# Patient Record
Sex: FEMALE | Race: WHITE | NOT HISPANIC OR LATINO | Employment: UNEMPLOYED | URBAN - NONMETROPOLITAN AREA
[De-identification: names, ages, dates, MRNs, and addresses within clinical notes are randomized per-mention and may not be internally consistent; named-entity substitution may affect disease eponyms.]

---
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
## 2019-02-04 ENCOUNTER — TELEPHONE (OUTPATIENT)
Dept: FAMILY MEDICINE | Facility: OTHER | Age: 19
End: 2019-02-04

## 2019-02-04 NOTE — TELEPHONE ENCOUNTER
10:37 AM    Reason for Call: OVERBOOK    Patient is having the following symptoms:   Needs Birth Control    The patient is requesting an appointment for today  with Brandi Nolan    Was an appointment offered for this call?   No    Preferred method for responding to this message: [phone]    If we cannot reach you directly, may we leave a detailed response at the number you provided?   Yes      Jennifer Brown

## 2019-02-05 NOTE — TELEPHONE ENCOUNTER
LM for patient apologizing in regards to her missed phone call yesterday, she is scheduled with Dagmar King but I did ask that she call back if she does want to see Brandi Nolan as she has openings tomorrow.

## 2019-02-06 ENCOUNTER — OFFICE VISIT (OUTPATIENT)
Dept: FAMILY MEDICINE | Facility: OTHER | Age: 19
End: 2019-02-06
Attending: NURSE PRACTITIONER
Payer: OTHER GOVERNMENT

## 2019-02-06 VITALS
TEMPERATURE: 98.6 F | DIASTOLIC BLOOD PRESSURE: 62 MMHG | SYSTOLIC BLOOD PRESSURE: 110 MMHG | BODY MASS INDEX: 17.74 KG/M2 | HEART RATE: 76 BPM | RESPIRATION RATE: 18 BRPM | OXYGEN SATURATION: 99 % | WEIGHT: 88 LBS | HEIGHT: 59 IN

## 2019-02-06 DIAGNOSIS — N92.6 IRREGULAR MENSTRUAL BLEEDING: Primary | ICD-10-CM

## 2019-02-06 LAB
BASOPHILS # BLD AUTO: 0 10E9/L (ref 0–0.2)
BASOPHILS NFR BLD AUTO: 0.6 %
DIFFERENTIAL METHOD BLD: ABNORMAL
EOSINOPHIL # BLD AUTO: 0.1 10E9/L (ref 0–0.7)
EOSINOPHIL NFR BLD AUTO: 2.4 %
ERYTHROCYTE [DISTWIDTH] IN BLOOD BY AUTOMATED COUNT: 17.7 % (ref 10–15)
FERRITIN SERPL-MCNC: 10 NG/ML (ref 12–150)
HCT VFR BLD AUTO: 36 % (ref 35–47)
HGB BLD-MCNC: 11.5 G/DL (ref 11.7–15.7)
IMM GRANULOCYTES # BLD: 0 10E9/L (ref 0–0.4)
IMM GRANULOCYTES NFR BLD: 0.2 %
IRON SATN MFR SERPL: 93 % (ref 15–46)
IRON SERPL-MCNC: 355 UG/DL (ref 35–180)
LYMPHOCYTES # BLD AUTO: 2 10E9/L (ref 0.8–5.3)
LYMPHOCYTES NFR BLD AUTO: 40.5 %
MCH RBC QN AUTO: 27.1 PG (ref 26.5–33)
MCHC RBC AUTO-ENTMCNC: 31.9 G/DL (ref 31.5–36.5)
MCV RBC AUTO: 85 FL (ref 78–100)
MONOCYTES # BLD AUTO: 0.3 10E9/L (ref 0–1.3)
MONOCYTES NFR BLD AUTO: 5.1 %
NEUTROPHILS # BLD AUTO: 2.5 10E9/L (ref 1.6–8.3)
NEUTROPHILS NFR BLD AUTO: 51.2 %
NRBC # BLD AUTO: 0 10*3/UL
NRBC BLD AUTO-RTO: 0 /100
PLATELET # BLD AUTO: 276 10E9/L (ref 150–450)
RBC # BLD AUTO: 4.24 10E12/L (ref 3.8–5.2)
TIBC SERPL-MCNC: 382 UG/DL (ref 240–430)
WBC # BLD AUTO: 4.9 10E9/L (ref 4–11)

## 2019-02-06 PROCEDURE — 83550 IRON BINDING TEST: CPT | Performed by: NURSE PRACTITIONER

## 2019-02-06 PROCEDURE — 83540 ASSAY OF IRON: CPT | Performed by: NURSE PRACTITIONER

## 2019-02-06 PROCEDURE — 99213 OFFICE O/P EST LOW 20 MIN: CPT | Performed by: NURSE PRACTITIONER

## 2019-02-06 PROCEDURE — 82728 ASSAY OF FERRITIN: CPT | Performed by: NURSE PRACTITIONER

## 2019-02-06 PROCEDURE — 36415 COLL VENOUS BLD VENIPUNCTURE: CPT | Performed by: NURSE PRACTITIONER

## 2019-02-06 PROCEDURE — 85025 COMPLETE CBC W/AUTO DIFF WBC: CPT | Performed by: NURSE PRACTITIONER

## 2019-02-06 ASSESSMENT — MIFFLIN-ST. JEOR: SCORE: 1079.8

## 2019-02-06 ASSESSMENT — PAIN SCALES - GENERAL: PAINLEVEL: MILD PAIN (2)

## 2019-02-06 NOTE — PROGRESS NOTES
SUBJECTIVE:   Trinh Hart is a 18 year old female who presents to clinic today for the following health issues:    Contraception management       Duration: ongoing     Description (location/character/radiation): N/A    Intensity:  mild    Accompanying signs and symptoms: Patient states she is constantly menstruating     History (similar episodes/previous evaluation): IUD in place     Precipitating or alleviating factors: None    Therapies tried and outcome: None         Problem list and histories reviewed & adjusted, as indicated.  Additional history: as documented    Patient Active Problem List   Diagnosis     NO ACTIVE PROBLEMS     Adjustment disorder with depressed mood     PTSD (post-traumatic stress disorder)     History reviewed. No pertinent surgical history.    Social History     Tobacco Use     Smoking status: Never Smoker     Smokeless tobacco: Never Used   Substance Use Topics     Alcohol use: No     Family History   Problem Relation Age of Onset     Mental Illness Mother      Mental Illness Father          Current Outpatient Medications   Medication Sig Dispense Refill     hydrOXYzine (ATARAX) 25 MG tablet Take 1-2 tablets (25-50 mg) by mouth every 6 hours as needed for anxiety 60 tablet 1     paragard intrauterine copper 1 each by Intrauterine route continuous       Allergies   Allergen Reactions     Apple      Gums/mouth gets itchy     Cherry      Gums/mouth gets itchy     Pistachios [Nuts]        Reviewed and updated as needed this visit by clinical staff       Reviewed and updated as needed this visit by Provider         ROS:  CONSTITUTIONAL: NEGATIVE for fever, chills, change in weight  ENT/MOUTH: NEGATIVE for ear, mouth and throat problems  RESP: NEGATIVE for significant cough or SOB  CV: NEGATIVE for chest pain, palpitations or peripheral edema  GI: abdominal pain epigastric  : abnormal menstrual cycles and denies dysuria    OBJECTIVE:     /62 (BP Location: Left arm, Patient Position:  "Sitting, Cuff Size: Adult Regular)   Pulse 76   Temp 98.6  F (37  C) (Tympanic)   Resp 18   Ht 1.499 m (4' 11\")   Wt 39.9 kg (88 lb)   LMP 01/18/2019   SpO2 99%   BMI 17.77 kg/m    Body mass index is 17.77 kg/m .   GENERAL: healthy, alert and no distress  RESP: lungs clear to auscultation - no rales, rhonchi or wheezes  CV: regular rate and rhythm, normal S1 S2, no S3 or S4, no murmur, click or rub, no peripheral edema and peripheral pulses strong  ABDOMEN: soft, nontender, no hepatosplenomegaly, no masses and bowel sounds normal  PSYCH: mentation appears normal, affect normal/bright    Diagnostic Test Results:  Results for orders placed or performed in visit on 02/06/19   CBC with platelets and differential   Result Value Ref Range    WBC 4.9 4.0 - 11.0 10e9/L    RBC Count 4.24 3.8 - 5.2 10e12/L    Hemoglobin 11.5 (L) 11.7 - 15.7 g/dL    Hematocrit 36.0 35.0 - 47.0 %    MCV 85 78 - 100 fl    MCH 27.1 26.5 - 33.0 pg    MCHC 31.9 31.5 - 36.5 g/dL    RDW 17.7 (H) 10.0 - 15.0 %    Platelet Count 276 150 - 450 10e9/L    Diff Method Automated Method     % Neutrophils 51.2 %    % Lymphocytes 40.5 %    % Monocytes 5.1 %    % Eosinophils 2.4 %    % Basophils 0.6 %    % Immature Granulocytes 0.2 %    Nucleated RBCs 0 0 /100    Absolute Neutrophil 2.5 1.6 - 8.3 10e9/L    Absolute Lymphocytes 2.0 0.8 - 5.3 10e9/L    Absolute Monocytes 0.3 0.0 - 1.3 10e9/L    Absolute Eosinophils 0.1 0.0 - 0.7 10e9/L    Absolute Basophils 0.0 0.0 - 0.2 10e9/L    Abs Immature Granulocytes 0.0 0 - 0.4 10e9/L    Absolute Nucleated RBC 0.0    Ferritin   Result Value Ref Range    Ferritin 10 (L) 12 - 150 ng/mL   Iron and iron binding capacity   Result Value Ref Range    Iron 355 (H) 35 - 180 ug/dL    Iron Binding Cap 382 240 - 430 ug/dL    Iron Saturation Index 93 (H) 15 - 46 %         ASSESSMENT/PLAN:     1. Irregular menstrual bleeding  Plan for follow up with OB/GYN   With continued low ferritin and elevated iron level, Plan to decrease " iron to every other day. She can take with food if she still has an upset stomach or switch to liquid.   - CBC with platelets and differential  - Ferritin  - Iron and iron binding capacity    Tobacco Cessation:   reports that  has never smoked. she has never used smokeless tobacco.        See Patient Instructions    RONEL Garcia [facility]

## 2019-02-06 NOTE — NURSING NOTE
"Chief Complaint   Patient presents with     Contraception       Initial /62 (BP Location: Left arm, Patient Position: Sitting, Cuff Size: Adult Regular)   Pulse 76   Temp 98.6  F (37  C) (Tympanic)   Resp 18   Ht 1.499 m (4' 11\")   Wt 39.9 kg (88 lb)   LMP 01/18/2019   SpO2 99%   BMI 17.77 kg/m   Estimated body mass index is 17.77 kg/m  as calculated from the following:    Height as of this encounter: 1.499 m (4' 11\").    Weight as of this encounter: 39.9 kg (88 lb).  Medication Reconciliation: complete    Noris Diaz LPN  "

## 2019-02-06 NOTE — PATIENT INSTRUCTIONS
Patient Education     Iron-Deficiency Anemia (Adult)  Red blood cells carry oxygen to the tissues of your body. Anemia is a condition in which you have too few red blood cells. You need iron to make red cells. Anemia makes you feel tired and run down. When anemia becomes severe, your skin becomes pale. You may feel short of breath after physical activity. Other symptoms include:    Headaches    Dizziness    Leg cramps with physical activity    Drowsiness    Restless legs  Your anemia is caused by not having enough iron in your body. This may be because of:    Loss of blood. This can be caused by heavy menstrual periods. It can also be caused by bleeding from the stomach or intestines.    Poor diet. You may not be eating enough foods that contain iron.    Inability to absorb iron from the foods you eat    Pregnancy  If your blood count is low enough, your healthcare provider may prescribe an iron supplement. It usually takes about 2 to 3 months of treatment with iron supplements to correct anemia. Severe cases of anemia need a blood transfusion to quickly ease symptoms and deliver more oxygen to the cells.  Home care  Follow these guidelines when caring for yourself at home:    Eat foods high in iron. This will boost the amount of iron stored in your body. It is a natural way to build up the number of blood cells. Good sources of iron include beef, liver, spinach and other dark green leafy vegetables, whole grains, beans, and nuts.    Don't overexert yourself.    Talk with your healthcare provider before traveling by air or traveling to high altitudes.  Follow-up care  Follow up with your healthcare provider in 2 months, or as advised. This is to have another red blood cell count to be sure your anemia has been fixed.  Call 911  Call 911 or seek immediate medical care if any of these occur:    Shortness of breath or chest pain    Dizziness or fainting    Vomiting blood or passing red or black-colored stool   Date  Last Reviewed: 3/1/2018    3649-2048 [employer]. [address] [place] [address], [place] [address]. All rights reserved. This information is not intended as a substitute for professional medical care. Always follow your healthcare professional's instructions.

## 2019-02-12 ENCOUNTER — OFFICE VISIT (OUTPATIENT)
Dept: OBGYN | Facility: OTHER | Age: 19
End: 2019-02-12
Attending: NURSE PRACTITIONER
Payer: OTHER GOVERNMENT

## 2019-02-12 VITALS
HEIGHT: 59 IN | HEART RATE: 69 BPM | BODY MASS INDEX: 17.74 KG/M2 | SYSTOLIC BLOOD PRESSURE: 102 MMHG | WEIGHT: 88 LBS | DIASTOLIC BLOOD PRESSURE: 60 MMHG | OXYGEN SATURATION: 100 %

## 2019-02-12 DIAGNOSIS — N92.1 BREAKTHROUGH BLEEDING WITH IUD: Primary | ICD-10-CM

## 2019-02-12 DIAGNOSIS — Z97.5 BREAKTHROUGH BLEEDING WITH IUD: Primary | ICD-10-CM

## 2019-02-12 PROCEDURE — 99212 OFFICE O/P EST SF 10 MIN: CPT | Performed by: NURSE PRACTITIONER

## 2019-02-12 RX ORDER — ACETAMINOPHEN AND CODEINE PHOSPHATE 120; 12 MG/5ML; MG/5ML
0.35 SOLUTION ORAL DAILY
Qty: 84 TABLET | Refills: 0 | Status: SHIPPED | OUTPATIENT
Start: 2019-02-12 | End: 2019-05-07

## 2019-02-12 ASSESSMENT — PAIN SCALES - GENERAL: PAINLEVEL: NO PAIN (0)

## 2019-02-12 ASSESSMENT — MIFFLIN-ST. JEOR: SCORE: 1079.8

## 2019-02-12 NOTE — NURSING NOTE
"Chief Complaint   Patient presents with     IUD     Bleeding issues since placed in September       Initial /60 (BP Location: Right arm, Patient Position: Sitting, Cuff Size: Adult Regular)   Pulse 69   Ht 1.499 m (4' 11\")   Wt 39.9 kg (88 lb)   LMP 01/18/2019   SpO2 100%   BMI 17.77 kg/m   Estimated body mass index is 17.77 kg/m  as calculated from the following:    Height as of this encounter: 1.499 m (4' 11\").    Weight as of this encounter: 39.9 kg (88 lb).  Medication Reconciliation: complete    Ligia Chow LPN    "

## 2019-02-13 NOTE — PROGRESS NOTES
"[facility]                HPI   Trinh presents for follow up to her concern of frequent spotting and breakthrough bleeding with the Paragard IUD.  She denies vaginal discharge, pain with intercourse, cramping, or discomfort. See prior evaluation.  We had discussed trying a bcp for a period of time to regulate periods in conjunction with the IUD.  She is requesting to try at this time.              Medications:     Current Outpatient Medications Ordered in Epic   Medication     norethindrone (MICRONOR) 0.35 MG tablet     hydrOXYzine (ATARAX) 25 MG tablet     paragard intrauterine copper     No current Epic-ordered facility-administered medications on file.                 Allergies:   Apple; Cherry; and Pistachios [nuts]         Review of Systems:   The 5 point Review of Systems is negative other than noted in the HPI                     Physical Exam:   Blood pressure 102/60, pulse 69, height 1.499 m (4' 11\"), weight 39.9 kg (88 lb), last menstrual period 01/18/2019, SpO2 100 %, not currently breastfeeding.  Constitutional:   awake, alert, cooperative, no apparent distress, and appears stated age              Data:   All laboratory data reviewed            Assessment and Plan:   Breakthrough bleeding with an IUD - will begin micronor in conjunction with her IUD in an attempt to stop the breakthrough bleeding.  Consider pelvic US if this is not successful.       MEHNAZ Lopez  2/13/2019  8:22 AM  "

## 2019-05-07 DIAGNOSIS — Z97.5 BREAKTHROUGH BLEEDING WITH IUD: ICD-10-CM

## 2019-05-07 DIAGNOSIS — N92.1 BREAKTHROUGH BLEEDING WITH IUD: ICD-10-CM

## 2019-05-07 RX ORDER — ACETAMINOPHEN AND CODEINE PHOSPHATE 120; 12 MG/5ML; MG/5ML
SOLUTION ORAL
Qty: 84 TABLET | Refills: 0 | Status: SHIPPED | OUTPATIENT
Start: 2019-05-07 | End: 2019-07-30

## 2019-05-09 ENCOUNTER — MYC MEDICAL ADVICE (OUTPATIENT)
Dept: FAMILY MEDICINE | Facility: OTHER | Age: 19
End: 2019-05-09

## 2019-06-07 ENCOUNTER — OFFICE VISIT (OUTPATIENT)
Dept: OBGYN | Facility: OTHER | Age: 19
End: 2019-06-07
Attending: NURSE PRACTITIONER
Payer: OTHER GOVERNMENT

## 2019-06-07 VITALS
BODY MASS INDEX: 17.34 KG/M2 | DIASTOLIC BLOOD PRESSURE: 56 MMHG | HEIGHT: 59 IN | HEART RATE: 80 BPM | WEIGHT: 86 LBS | SYSTOLIC BLOOD PRESSURE: 106 MMHG

## 2019-06-07 DIAGNOSIS — Z97.5 BREAKTHROUGH BLEEDING WITH IUD: ICD-10-CM

## 2019-06-07 DIAGNOSIS — N92.1 BREAKTHROUGH BLEEDING WITH IUD: ICD-10-CM

## 2019-06-07 PROCEDURE — 99213 OFFICE O/P EST LOW 20 MIN: CPT | Performed by: NURSE PRACTITIONER

## 2019-06-07 RX ORDER — HYDROXYZINE HYDROCHLORIDE 25 MG/1
25 TABLET, FILM COATED ORAL
COMMUNITY
End: 2019-06-07

## 2019-06-07 RX ORDER — ALBUTEROL SULFATE 90 UG/1
AEROSOL, METERED RESPIRATORY (INHALATION)
Refills: 0 | COMMUNITY
Start: 2019-06-04 | End: 2020-08-04

## 2019-06-07 RX ORDER — ALBUTEROL SULFATE 90 UG/1
2 AEROSOL, METERED RESPIRATORY (INHALATION)
COMMUNITY
Start: 2019-06-04 | End: 2019-06-07

## 2019-06-07 ASSESSMENT — MIFFLIN-ST. JEOR: SCORE: 1070.72

## 2019-06-07 ASSESSMENT — PAIN SCALES - GENERAL: PAINLEVEL: NO PAIN (0)

## 2019-06-07 NOTE — PROGRESS NOTES
"[facility]                HPI   Trinh presents for follow up on irregular periods.  She has a Paragard IUD and is also using micronor to control irregular bleeding. She states this is working well for her and she wishes to continue using both methods.  She understands that the IUD could be changed to a low progesterone form.  She wishes to continue as is.  She denies other concerns.              Medications:     Current Outpatient Medications Ordered in Epic   Medication     albuterol (PROAIR HFA/PROVENTIL HFA/VENTOLIN HFA) 108 (90 Base) MCG/ACT inhaler     hydrOXYzine (ATARAX) 25 MG tablet     norethindrone (MICRONOR) 0.35 MG tablet     paragard intrauterine copper     No current Epic-ordered facility-administered medications on file.                 Allergies:   Apple; Cherry; and Pistachios [nuts]         Review of Systems:   The 5 point Review of Systems is negative other than noted in the HPI                     Physical Exam:   Blood pressure 106/56, pulse 80, height 1.499 m (4' 11\"), weight 39 kg (86 lb), not currently breastfeeding.  Constitutional:   awake, alert, cooperative, no apparent distress, and appears stated age               Assessment and Plan:   Irregular bleeding - plan to continue with Micronor as Paragard IUD.  Plan refill for 1 year.     MEHNAZ Lopez  6/7/2019  1:56 PM  "

## 2019-06-07 NOTE — NURSING NOTE
"Chief Complaint   Patient presents with     IUD     Follow up, paragard       Initial /56   Pulse 80   Ht 1.499 m (4' 11\")   Wt 39 kg (86 lb)   BMI 17.37 kg/m   Estimated body mass index is 17.37 kg/m  as calculated from the following:    Height as of this encounter: 1.499 m (4' 11\").    Weight as of this encounter: 39 kg (86 lb).  Medication Reconciliation: complete    Andreia Nagy LPN    "

## 2019-06-10 ENCOUNTER — MYC MEDICAL ADVICE (OUTPATIENT)
Dept: FAMILY MEDICINE | Facility: OTHER | Age: 19
End: 2019-06-10

## 2019-06-11 ENCOUNTER — OFFICE VISIT (OUTPATIENT)
Dept: FAMILY MEDICINE | Facility: OTHER | Age: 19
End: 2019-06-11
Attending: NURSE PRACTITIONER
Payer: OTHER GOVERNMENT

## 2019-06-11 VITALS
OXYGEN SATURATION: 99 % | WEIGHT: 88 LBS | SYSTOLIC BLOOD PRESSURE: 115 MMHG | TEMPERATURE: 98.7 F | HEART RATE: 66 BPM | DIASTOLIC BLOOD PRESSURE: 60 MMHG | BODY MASS INDEX: 17.77 KG/M2

## 2019-06-11 DIAGNOSIS — F41.0 ANXIETY ATTACK: Primary | ICD-10-CM

## 2019-06-11 PROCEDURE — 99213 OFFICE O/P EST LOW 20 MIN: CPT | Performed by: NURSE PRACTITIONER

## 2019-06-11 RX ORDER — GABAPENTIN 300 MG/1
300 CAPSULE ORAL DAILY PRN
Qty: 30 CAPSULE | Refills: 3 | Status: SHIPPED | OUTPATIENT
Start: 2019-06-11 | End: 2019-12-11

## 2019-06-11 ASSESSMENT — ANXIETY QUESTIONNAIRES
5. BEING SO RESTLESS THAT IT IS HARD TO SIT STILL: NOT AT ALL
3. WORRYING TOO MUCH ABOUT DIFFERENT THINGS: NEARLY EVERY DAY
IF YOU CHECKED OFF ANY PROBLEMS ON THIS QUESTIONNAIRE, HOW DIFFICULT HAVE THESE PROBLEMS MADE IT FOR YOU TO DO YOUR WORK, TAKE CARE OF THINGS AT HOME, OR GET ALONG WITH OTHER PEOPLE: EXTREMELY DIFFICULT
2. NOT BEING ABLE TO STOP OR CONTROL WORRYING: NEARLY EVERY DAY
1. FEELING NERVOUS, ANXIOUS, OR ON EDGE: NEARLY EVERY DAY
GAD7 TOTAL SCORE: 14
6. BECOMING EASILY ANNOYED OR IRRITABLE: SEVERAL DAYS
7. FEELING AFRAID AS IF SOMETHING AWFUL MIGHT HAPPEN: NEARLY EVERY DAY

## 2019-06-11 ASSESSMENT — PAIN SCALES - GENERAL: PAINLEVEL: NO PAIN (0)

## 2019-06-11 ASSESSMENT — PATIENT HEALTH QUESTIONNAIRE - PHQ9
SUM OF ALL RESPONSES TO PHQ QUESTIONS 1-9: 1
5. POOR APPETITE OR OVEREATING: SEVERAL DAYS

## 2019-06-11 NOTE — LETTER
My Depression Action Plan  Name: Trinh Hart   Date of Birth 2000  Date: 6/11/2019    My doctor: Brandi Nolan   My clinic: [facility] [place]  [address]  [place] [address]  [phone]          GREEN    ZONE   Good Control    What it looks like:     Things are going generally well. You have normal up s and down s. You may even feel depressed from time to time, but bad moods usually last less than a day.   What you need to do:  1. Continue to care for yourself (see self care plan)  2. Check your depression survival kit and update it as needed  3. Follow your physician s recommendations including any medication.  4. Do not stop taking medication unless you consult with your physician first.           YELLOW         ZONE Getting Worse    What it looks like:     Depression is starting to interfere with your life.     It may be hard to get out of bed; you may be starting to isolate yourself from others.    Symptoms of depression are starting to last most all day and this has happened for several days.     You may have suicidal thoughts but they are not constant.   What you need to do:     1. Call your care team, your response to treatment will improve if you keep your care team informed of your progress. Yellow periods are signs an adjustment may need to be made.     2. Continue your self-care, even if you have to fake it!    3. Talk to someone in your support network    4. Open up your depression survival kit           RED    ZONE Medical Alert - Get Help    What it looks like:     Depression is seriously interfering with your life.     You may experience these or other symptoms: You can t get out of bed most days, can t work or engage in other necessary activities, you have trouble taking care of basic hygiene, or basic responsibilities, thoughts of suicide or death that will not go away, self-injurious behavior.     What you need to do:  1. Call your care team and  request a same-day appointment. If they are not available (weekends or after hours) call your local crisis line, emergency room or 911.            Depression Self Care Plan / Survival Kit    Self-Care for Depression  Here s the deal. Your body and mind are really not as separate as most people think.  What you do and think affects how you feel and how you feel influences what you do and think. This means if you do things that people who feel good do, it will help you feel better.  Sometimes this is all it takes.  There is also a place for medication and therapy depending on how severe your depression is, so be sure to consult with your medical provider and/ or Behavioral Health Consultant if your symptoms are worsening or not improving.     In order to better manage my stress, I will:    Exercise  Get some form of exercise, every day. This will help reduce pain and release endorphins, the  feel good  chemicals in your brain. This is almost as good as taking antidepressants!  This is not the same as joining a gym and then never going! (they count on that by the way ) It can be as simple as just going for a walk or doing some gardening, anything that will get you moving.      Hygiene   Maintain good hygiene (Get out of bed in the morning, Make your bed, Brush your teeth, Take a shower, and Get dressed like you were going to work, even if you are unemployed).  If your clothes don't fit try to get ones that do.    Diet  I will strive to eat foods that are good for me, drink plenty of water, and avoid excessive sugar, caffeine, alcohol, and other mood-altering substances.  Some foods that are helpful in depression are: complex carbohydrates, B vitamins, flaxseed, fish or fish oil, fresh fruits and vegetables.    Psychotherapy  I agree to participate in Individual Therapy (if recommended).    Medication  If prescribed medications, I agree to take them.  Missing doses can result in serious side effects.  I understand that  drinking alcohol, or other illicit drug use, may cause potential side effects.  I will not stop my medication abruptly without first discussing it with my provider.    Staying Connected With Others  I will stay in touch with my friends, family members, and my primary care provider/team.    Use your imagination  Be creative.  We all have a creative side; it doesn t matter if it s oil painting, sand castles, or mud pies! This will also kick up the endorphins.    Witness Beauty  (AKA stop and smell the roses) Take a look outside, even in mid-winter. Notice colors, textures. Watch the squirrels and birds.     Service to others  Be of service to others.  There is always someone else in need.  By helping others we can  get out of ourselves  and remember the really important things.  This also provides opportunities for practicing all the other parts of the program.    Humor  Laugh and be silly!  Adjust your TV habits for less news and crime-drama and more comedy.    Control your stress  Try breathing deep, massage therapy, biofeedback, and meditation. Find time to relax each day.     My support system    Clinic Contact:  Phone number:    Contact 1:  Phone number:    Contact 2:  Phone number:    [place]/:  Phone number:    Therapist:  Phone number:    Local crisis center:    Phone number:    Other community support:  Phone number:

## 2019-06-11 NOTE — PATIENT INSTRUCTIONS
Patient Education   Patient Education     Panic Attack  A panic attack is an extreme fear reaction that comes on for no clear reason. There is often a fear that something terrible will happen or that you may die. The attack may last a few minutes up to a few hours. Between attacks, things will seem quite normal. This condition has a psychological cause and can be treated with the help of a therapist or psychiatrist. Medicine can be very helpful for this problem.  Panic attacks usually come on suddenly, reaches a peak within minutes, and includes at least 4 of these symptoms:    Palpitations, pounding heart, or accelerated heart rate    Sweating    Chills or heat sensations    Trembling or shaking    Sensations of shortness of breath or smothering    Feelings of choking    Chest pain or discomfort    Nausea or abdominal distress    Feeling dizzy, unsteady, light-headed, or faint    Numbness or tingling sensations    Fear of dying    Fear of going crazy or of losing control    Feelings of unreality, strangeness, or detachment from the environment  Many of these symptoms can be linked to physical problems, so it is sometimes necessary to rule out conditions like thyroid disorders, heart disease, gastrointestinal problems, and others. They can also start as physical symptoms, but psychologically we may react to them in a fearful way, worsening the way we react and feel.  Home care    Try to find the sources of stress in your life. They may not be obvious. These may include:  ? Daily hassles of life which pile up (traffic jams, missed appointments, car troubles).  ? Major life changes, both good (new baby, job promotion) and bad (loss of job, loss of loved one).  ? Feeling that you have too many responsibilities and can't take care of everything at once.  ? Helplessness: feeling like your problems are too much for you to handle.    Notice how your body reacts to stress. Learn to listen to your body signals so that you  can take action before the stress becomes severe.    Try to be aware of what you were doing before the reaction started; this may give you clues to things that can trigger a reaction. It may be situations in your life, or what you were doing at the time.    When possible, avoid or reduce the cause of stress. Avoid hassles, limit the amount of change that is happening in your life at one time or take a break when you feel overloaded.    Unfortunately, you can't stay away from many stressful situations. So you need to learn how to manage stress better. Many proven methods will reduce your anxiety. These include simple things like exercise, good nutrition, and adequate rest. Also, there are certain techniques that are helpful: relaxation and breathing exercises, visualization, biofeedback, meditation, or simply taking time-out to clear your mind. For more information about this, ask your doctor or go to a local bookstore and review the many books and tapes available on this subject.  Follow-up care  Follow-up with your healthcare provider, or as advised.  Call 911  Call 911 if you:    Have suicidal thoughts, a suicide plan, and the means to carry out the plan    Have serious thoughts of hurting someone else    Have trouble breathing    Are very confused    Feel very drowsy or have trouble awakening    Faint or lose consciousness    Have new chest pain that becomes more severe, lasts longer, or spreads into your shoulder, arm, neck, jaw, or back    Have a very rapid or irregular heartbeat    Have a seizure  When to seek medical advice  Call your healthcare provider right away if any of these occur:    Worsening of your symptoms to the point of feeling out-of-control    Feeling that you may try to harm yourself or another    Can't sleep or eat for 3 days in a row    Increased pain with breathing    Increasing feeling of weakness or dizziness    Cough with dark colored sputum (phlegm) or blood    Fever of 100.4 F (38 C)  or higher, or as directed by your healthcare provider    Swelling, pain, or redness in one leg    Requests by family or friends for you to seek help for your symptoms  Date Last Reviewed: 3/1/2018    3582-8948 [employer]. [address] [place] [address], [place] [address]. All rights reserved. This information is not intended as a substitute for professional medical care. Always follow your healthcare professional's instructions.           Gabapentin capsules or tablets  Brand Names: Active-PAC with Gabapentin, Neurontin  What is this medicine?  GABAPENTIN (GA ba pen tin) is used to control partial seizures in adults with epilepsy. It is also used to treat certain types of nerve pain.  How should I use this medicine?  Take this medicine by mouth with a glass of water. Follow the directions on the prescription label. You can take it with or without food. If it upsets your stomach, take it with food.Take your medicine at regular intervals. Do not take it more often than directed. Do not stop taking except on your doctor's advice.  If you are directed to break the 600 or 800 mg tablets in half as part of your dose, the extra half tablet should be used for the next dose. If you have not used the extra half tablet within 28 days, it should be thrown away.  A special MedGuide will be given to you by the pharmacist with each prescription and refill. Be sure to read this information carefully each time.  Talk to your pediatrician regarding the use of this medicine in children. Special care may be needed.  What side effects may I notice from receiving this medicine?  Side effects that you should report to your doctor or health care professional as soon as possible:    allergic reactions like skin rash, itching or hives, swelling of the face, lips, or tongue    worsening of mood, thoughts or actions of suicide or dying  Side effects that usually do not require medical attention (report to your doctor or health care  professional if they continue or are bothersome):    constipation    difficulty walking or controlling muscle movements    dizziness    nausea    slurred speech    tiredness    tremors    weight gain  What may interact with this medicine?  Do not take this medicine with any of the following medications:    other gabapentin products  This medicine may also interact with the following medications:    alcohol    antacids    antihistamines for allergy, cough and cold    certain medicines for anxiety or sleep    certain medicines for depression or psychotic disturbances    homatropine; hydrocodone    naproxen    narcotic medicines (opiates) for pain    phenothiazines like chlorpromazine, mesoridazine, prochlorperazine, thioridazine  What if I miss a dose?  If you miss a dose, take it as soon as you can. If it is almost time for your next dose, take only that dose. Do not take double or extra doses.  Where should I keep my medicine?  Keep out of reach of children.  This medicine may cause accidental overdose and death if it taken by other adults, children, or pets. Mix any unused medicine with a substance like cat litter or coffee grounds. Then throw the medicine away in a sealed container like a sealed bag or a coffee can with a lid. Do not use the medicine after the expiration date.  Store at room temperature between 15 and 30 degrees C (59 and 86 degrees F).  What should I tell my health care provider before I take this medicine?  They need to know if you have any of these conditions:    kidney disease    suicidal thoughts, plans, or attempt; a previous suicide attempt by you or a family member    an unusual or allergic reaction to gabapentin, other medicines, foods, dyes, or preservatives    pregnant or trying to get pregnant    breast-feeding  What should I watch for while using this medicine?  Visit your doctor or health care professional for regular checks on your progress. You may want to keep a record at home of  how you feel your condition is responding to treatment. You may want to share this information with your doctor or health care professional at each visit. You should contact your doctor or health care professional if your seizures get worse or if you have any new types of seizures. Do not stop taking this medicine or any of your seizure medicines unless instructed by your doctor or health care professional. Stopping your medicine suddenly can increase your seizures or their severity.  Wear a medical identification bracelet or chain if you are taking this medicine for seizures, and carry a card that lists all your medications.  You may get drowsy, dizzy, or have blurred vision. Do not drive, use machinery, or do anything that needs mental alertness until you know how this medicine affects you. To reduce dizzy or fainting spells, do not sit or stand up quickly, especially if you are an older patient. Alcohol can increase drowsiness and dizziness. Avoid alcoholic drinks.  Your mouth may get dry. Chewing sugarless gum or sucking hard candy, and drinking plenty of water will help.  The use of this medicine may increase the chance of suicidal thoughts or actions. Pay special attention to how you are responding while on this medicine. Any worsening of mood, or thoughts of suicide or dying should be reported to your health care professional right away.  Women who become pregnant while using this medicine may enroll in the [place] Antiepileptic Drug Pregnancy Registry by calling 1-743.774.1448. This registry collects information about the safety of antiepileptic drug use during pregnancy.  NOTE:This sheet is a summary. It may not cover all possible information. If you have questions about this medicine, talk to your doctor, pharmacist, or health care provider. Copyright  2018 [employer]

## 2019-06-11 NOTE — PROGRESS NOTES
Subjective     Trinh Hart is a 18 year old female who presents to clinic today for the following health issues:    HPI   Anxiety Follow-Up    How are you doing with your anxiety since your last visit? Worsened     Are you having other symptoms that might be associated with anxiety? No    Have you had a significant life event? No     Are you feeling depressed? No    Do you have any concerns with your use of alcohol or other drugs? No    Social History     Tobacco Use     Smoking status: Never Smoker     Smokeless tobacco: Never Used   Substance Use Topics     Alcohol use: No     Drug use: No     GLORIA-7 SCORE 3/28/2018 4/18/2018 7/10/2018   Total Score 12 9 6     PHQ 3/28/2018 4/18/2018 7/10/2018   PHQ-9 Total Score 4 3 2   Q9: Thoughts of better off dead/self-harm past 2 weeks Not at all Not at all Not at all           Amount of exercise or physical activity: 6-7 days/week for an average of 30-45 minutes    Problems taking medications regularly: No    Medication side effects: none    Diet: regular (no restrictions)          Patient Active Problem List   Diagnosis     NO ACTIVE PROBLEMS     Adjustment disorder with depressed mood     PTSD (post-traumatic stress disorder)     Suicidal behavior     Thelarche, premature     Adjustment disorder with mixed disturbance of emotions and conduct     History reviewed. No pertinent surgical history.    Social History     Tobacco Use     Smoking status: Never Smoker     Smokeless tobacco: Never Used   Substance Use Topics     Alcohol use: No     Family History   Problem Relation Age of Onset     Mental Illness Mother      Mental Illness Father          Current Outpatient Medications   Medication Sig Dispense Refill     albuterol (PROAIR HFA/PROVENTIL HFA/VENTOLIN HFA) 108 (90 Base) MCG/ACT inhaler INL 2 PUFFS INTO THE LUNGS Q 4 H PRF WHEEZING OR SHORTNESS OF BREATH. SHAKE BEFORE USING  0     hydrOXYzine (ATARAX) 25 MG tablet Take 1-2 tablets (25-50 mg) by mouth every 6 hours  as needed for anxiety 60 tablet 1     norethindrone (MICRONOR) 0.35 MG tablet TAKE 1 TABLET(0.35 MG) BY MOUTH DAILY 84 tablet 0     paragard intrauterine copper 1 each by Intrauterine route continuous       Allergies   Allergen Reactions     Apple      Gums/mouth gets itchy     Cherry      Gums/mouth gets itchy     Pistachios [Nuts]        Reviewed and updated as needed this visit by Provider         Review of Systems   ROS COMP: CONSTITUTIONAL: NEGATIVE for fever, chills, change in weight  INTEGUMENTARY/SKIN: NEGATIVE for worrisome rashes, moles or lesions  RESP:SOB possible allergies vs anxiety  CV: NEGATIVE for chest pain, palpitations or peripheral edema  PSYCHIATRIC: increased anxiety       Objective    /60 (BP Location: Right arm, Patient Position: Sitting, Cuff Size: Child)   Pulse 66   Temp 98.7  F (37.1  C) (Tympanic)   Wt 39.9 kg (88 lb)   SpO2 99%   BMI 17.77 kg/m    Body mass index is 17.77 kg/m .  Physical Exam   GENERAL: healthy, alert and no distress  NECK: no adenopathy, no asymmetry, masses, or scars and thyroid normal to palpation  RESP: lungs clear to auscultation - no rales, rhonchi or wheezes  CV: regular rate and rhythm, normal S1 S2, no S3 or S4, no murmur, click or rub, no peripheral edema and peripheral pulses strong  ABDOMEN: soft, nontender, no hepatosplenomegaly, no masses and bowel sounds normal  PSYCH: mentation appears normal and anxious    Diagnostic Test Results:  Labs reviewed in Epic        Assessment & Plan     1. Anxiety attack  Plan to try gabapentin for anxiety/panic attacks. She can use as needed, but with her chronic anxiety she would probable do better on a daily medication.  Discussed medication and side effects. Plan for follow up in a month. She will continue counseling with Mary at "[facility].   - gabapentin (NEURONTIN) 300 MG capsule; Take 1 capsule (300 mg) by mouth daily as needed (anxiety)  Dispense: 30 capsule; Refill: 3       See Patient  Instructions    Return in about 1 month (around 7/9/2019) for anxiety.    RONEL Garcia [facility]

## 2019-06-11 NOTE — NURSING NOTE
"Chief Complaint   Patient presents with     Anxiety       Initial /60 (BP Location: Right arm, Patient Position: Sitting, Cuff Size: Child)   Pulse 66   Temp 98.7  F (37.1  C) (Tympanic)   Wt 39.9 kg (88 lb)   SpO2 99%   BMI 17.77 kg/m   Estimated body mass index is 17.77 kg/m  as calculated from the following:    Height as of 6/7/19: 1.499 m (4' 11\").    Weight as of this encounter: 39.9 kg (88 lb).  Medication Reconciliation: complete    Priya Jacobs LPN  "

## 2019-06-12 ASSESSMENT — ANXIETY QUESTIONNAIRES: GAD7 TOTAL SCORE: 14

## 2019-07-30 DIAGNOSIS — F43.22 ADJUSTMENT DISORDER WITH ANXIOUS MOOD: ICD-10-CM

## 2019-07-30 NOTE — TELEPHONE ENCOUNTER
Atarax       Last Written Prescription Date:  7/10/2018  Last Fill Quantity: 60,   # refills: 1  Last Office Visit: 6/11/2019  Future Office visit:

## 2019-07-31 RX ORDER — HYDROXYZINE HYDROCHLORIDE 25 MG/1
TABLET, FILM COATED ORAL
Qty: 60 TABLET | Refills: 0 | Status: SHIPPED | OUTPATIENT
Start: 2019-07-31 | End: 2022-12-22

## 2019-10-09 ENCOUNTER — MYC MEDICAL ADVICE (OUTPATIENT)
Dept: FAMILY MEDICINE | Facility: OTHER | Age: 19
End: 2019-10-09

## 2019-10-10 NOTE — TELEPHONE ENCOUNTER
Brandi is out for the next few days. I believe there is an enrollment program but I am not sure where to get the forms. I would check on line. You would then need an appointment with Brandi to complete forms and verify.

## 2019-11-07 ENCOUNTER — E-VISIT (OUTPATIENT)
Dept: FAMILY MEDICINE | Facility: OTHER | Age: 19
End: 2019-11-07
Payer: OTHER GOVERNMENT

## 2019-11-07 DIAGNOSIS — F41.9 ANXIETY: Primary | ICD-10-CM

## 2019-11-07 PROCEDURE — 99213 OFFICE O/P EST LOW 20 MIN: CPT | Mod: 95 | Performed by: NURSE PRACTITIONER

## 2019-11-07 ASSESSMENT — ANXIETY QUESTIONNAIRES
6. BECOMING EASILY ANNOYED OR IRRITABLE: NOT AT ALL
3. WORRYING TOO MUCH ABOUT DIFFERENT THINGS: NEARLY EVERY DAY
4. TROUBLE RELAXING: NOT AT ALL
7. FEELING AFRAID AS IF SOMETHING AWFUL MIGHT HAPPEN: NEARLY EVERY DAY
1. FEELING NERVOUS, ANXIOUS, OR ON EDGE: NEARLY EVERY DAY
5. BEING SO RESTLESS THAT IT IS HARD TO SIT STILL: NOT AT ALL
2. NOT BEING ABLE TO STOP OR CONTROL WORRYING: NEARLY EVERY DAY
7. FEELING AFRAID AS IF SOMETHING AWFUL MIGHT HAPPEN: NEARLY EVERY DAY
GAD7 TOTAL SCORE: 12
GAD7 TOTAL SCORE: 12

## 2019-11-08 RX ORDER — ESCITALOPRAM OXALATE 10 MG/1
10 TABLET ORAL DAILY
Qty: 30 TABLET | Refills: 1 | Status: SHIPPED | OUTPATIENT
Start: 2019-11-08 | End: 2020-01-09

## 2019-11-08 ASSESSMENT — ANXIETY QUESTIONNAIRES: GAD7 TOTAL SCORE: 12

## 2019-11-08 NOTE — TELEPHONE ENCOUNTER
ELECTRONIC VISIT DOCUMENTATION:    SUBJECTIVE:  Note above accurately captures the substance of my conversation with the patient.    ASSESSMENT / PLAN:  (F41.9) Anxiety  (primary encounter diagnosis)  Comment: continued anxiety should start a daily treatment.   Plan: escitalopram (LEXAPRO) 10 MG tablet        Follow up in 1 month

## 2019-11-08 NOTE — PATIENT INSTRUCTIONS
Thank you for choosing us for your care. I have placed an order for a prescription so that you can start treatment. View your full visit summary for details by clicking on the link below. Your pharmacist will able to address any questions you may have about the medication.      If you're not feeling better within 4-6 weeks please schedule an appointment.  You can schedule an appointment right here in [place], or call [phone]  If the visit is for the same symptoms as your e-visit, we'll refund the cost of your e-visit if seen within seven days.

## 2019-12-11 ENCOUNTER — OFFICE VISIT (OUTPATIENT)
Dept: FAMILY MEDICINE | Facility: OTHER | Age: 19
End: 2019-12-11
Attending: NURSE PRACTITIONER
Payer: OTHER GOVERNMENT

## 2019-12-11 VITALS
WEIGHT: 88 LBS | HEART RATE: 78 BPM | OXYGEN SATURATION: 99 % | BODY MASS INDEX: 17.77 KG/M2 | SYSTOLIC BLOOD PRESSURE: 114 MMHG | DIASTOLIC BLOOD PRESSURE: 76 MMHG

## 2019-12-11 DIAGNOSIS — F41.9 ANXIETY: Primary | ICD-10-CM

## 2019-12-11 PROCEDURE — 99213 OFFICE O/P EST LOW 20 MIN: CPT | Performed by: NURSE PRACTITIONER

## 2019-12-11 ASSESSMENT — PAIN SCALES - GENERAL: PAINLEVEL: NO PAIN (0)

## 2019-12-12 NOTE — PATIENT INSTRUCTIONS
Patient Education   Treatment for anxiety:    -meditation   APPS: (suggested by the ADA) can download on IPhone and Android    -Calm   -Headspace   -Insight Timer  -yoga  -journaling  -weighted blanket  -Epson Salt and lavender baths      Panic Attack  A panic attack is an extreme fear reaction that comes on for no clear reason. There is often a fear that something terrible will happen or that you may die. The attack may last a few minutes up to a few hours. Between attacks, things will seem quite normal. This condition has a psychological cause and can be treated with the help of a therapist or psychiatrist. Medicine can be very helpful for this problem.  Panic attacks usually come on suddenly, reaches a peak within minutes, and includes at least 4 of these symptoms:    Palpitations, pounding heart, or accelerated heart rate    Sweating    Chills or heat sensations    Trembling or shaking    Sensations of shortness of breath or smothering    Feelings of choking    Chest pain or discomfort    Nausea or abdominal distress    Feeling dizzy, unsteady, light-headed, or faint    Numbness or tingling sensations    Fear of dying    Fear of going crazy or of losing control    Feelings of unreality, strangeness, or detachment from the environment  Many of these symptoms can be linked to physical problems, so it is sometimes necessary to rule out conditions like thyroid disorders, heart disease, gastrointestinal problems, and others. They can also start as physical symptoms, but psychologically we may react to them in a fearful way, worsening the way we react and feel.  Home care    Try to find the sources of stress in your life. They may not be obvious. These may include:  ? Daily hassles of life which pile up (traffic jams, missed appointments, car troubles).  ? Major life changes, both good (new baby, job promotion) and bad (loss of job, loss of loved one).  ? Feeling that you have too many responsibilities and can't take  care of everything at once.  ? Helplessness: feeling like your problems are too much for you to handle.    Notice how your body reacts to stress. Learn to listen to your body signals so that you can take action before the stress becomes severe.    Try to be aware of what you were doing before the reaction started; this may give you clues to things that can trigger a reaction. It may be situations in your life, or what you were doing at the time.    When possible, avoid or reduce the cause of stress. Avoid hassles, limit the amount of change that is happening in your life at one time or take a break when you feel overloaded.    Unfortunately, you can't stay away from many stressful situations. So you need to learn how to manage stress better. Many proven methods will reduce your anxiety. These include simple things like exercise, good nutrition, and adequate rest. Also, there are certain techniques that are helpful: relaxation and breathing exercises, visualization, biofeedback, meditation, or simply taking time-out to clear your mind. For more information about this, ask your doctor or go to a local bookstore and review the many books and tapes available on this subject.  Follow-up care  Follow-up with your healthcare provider, or as advised.  Call 911  Call 911 if you:    Have suicidal thoughts, a suicide plan, and the means to carry out the plan    Have serious thoughts of hurting someone else    Have trouble breathing    Are very confused    Feel very drowsy or have trouble awakening    Faint or lose consciousness    Have new chest pain that becomes more severe, lasts longer, or spreads into your shoulder, arm, neck, jaw, or back    Have a very rapid or irregular heartbeat    Have a seizure  When to seek medical advice  Call your healthcare provider right away if any of these occur:    Worsening of your symptoms to the point of feeling out-of-control    Feeling that you may try to harm yourself or  another    Can't sleep or eat for 3 days in a row    Increased pain with breathing    Increasing feeling of weakness or dizziness    Cough with dark colored sputum (phlegm) or blood    Fever of 100.4 F (38 C) or higher, or as directed by your healthcare provider    Swelling, pain, or redness in one leg    Requests by family or friends for you to seek help for your symptoms  Date Last Reviewed: 3/1/2018    3826-2773 [employer]. [address] [place] [address]. All rights reserved. This information is not intended as a substitute for professional medical care. Always follow your healthcare professional's instructions.

## 2019-12-12 NOTE — PROGRESS NOTES
Subjective     Trinh Hart is a 19 year old female who presents to clinic today for the following health issues:    HPI   Medication Followup of lexapro  States is only taking the 5 mg of lexapro at this time but would like to increase to 10 mg during the holiday season.     Taking Medication as prescribed: pt taking half a dose    Side Effects:  Night panics, cold sweats, clammy hands, loss of appetitie, lowered sex drive    Medication Helping Symptoms:  yes         Patient Active Problem List   Diagnosis     NO ACTIVE PROBLEMS     Adjustment disorder with depressed mood     PTSD (post-traumatic stress disorder)     Suicidal behavior     Thelarche, premature     Adjustment disorder with mixed disturbance of emotions and conduct     History reviewed. No pertinent surgical history.    Social History     Tobacco Use     Smoking status: Never Smoker     Smokeless tobacco: Never Used   Substance Use Topics     Alcohol use: No     Family History   Problem Relation Age of Onset     Mental Illness Mother      Mental Illness Father          Current Outpatient Medications   Medication Sig Dispense Refill     albuterol (PROAIR HFA/PROVENTIL HFA/VENTOLIN HFA) 108 (90 Base) MCG/ACT inhaler INL 2 PUFFS INTO THE LUNGS Q 4 H PRF WHEEZING OR SHORTNESS OF BREATH. SHAKE BEFORE USING  0     escitalopram (LEXAPRO) 10 MG tablet Take 1 tablet (10 mg) by mouth daily 30 tablet 1     hydrOXYzine (ATARAX) 25 MG tablet TAKE 1 TO 2 TABLETS(25 TO 50 MG) BY MOUTH EVERY 6 HOURS AS NEEDED FOR ANXIETY 60 tablet 0     norethindrone (MICRONOR) 0.35 MG tablet TAKE 1 TABLET(0.35 MG) BY MOUTH DAILY 84 tablet 1     paragard intrauterine copper 1 each by Intrauterine route continuous       Allergies   Allergen Reactions     Apple      Gums/mouth gets itchy     Cherry      Gums/mouth gets itchy     Pistachios [Nuts]      BP Readings from Last 3 Encounters:   12/11/19 114/76   06/11/19 115/60   06/07/19 106/56    Wt Readings from Last 3 Encounters:    12/11/19 39.9 kg (88 lb)   06/11/19 39.9 kg (88 lb) (<1 %)*   06/07/19 39 kg (86 lb) (<1 %)*     * Growth percentiles are based on CDC (Girls, 2-20 Years) data.                 Reviewed and updated as needed this visit by Provider         Review of Systems   ROS COMP: CONSTITUTIONAL: NEGATIVE for fever, chills, change in weight  INTEGUMENTARY/SKIN: NEGATIVE for worrisome rashes, moles or lesions  RESP: NEGATIVE for significant cough or SOB  CV: NEGATIVE for chest pain, palpitations or peripheral edema  PSYCHIATRIC: increase anxiety recently      Objective    /76   Pulse 78   Wt 39.9 kg (88 lb)   SpO2 99%   BMI 17.77 kg/m    Body mass index is 17.77 kg/m .  Physical Exam   GENERAL: healthy, alert and no distress  NECK: no adenopathy, no asymmetry, masses, or scars and thyroid normal to palpation  RESP: lungs clear to auscultation - no rales, rhonchi or wheezes  CV: regular rate and rhythm, normal S1 S2, no S3 or S4, no murmur, click or rub, no peripheral edema and peripheral pulses strong  ABDOMEN: soft, nontender, no hepatosplenomegaly, no masses and bowel sounds normal  PSYCH: mentation appears normal, affect normal/bright    Diagnostic Test Results:  Labs reviewed in Epic        Assessment & Plan     1. Anxiety  Trinh is planning on increasing her Lexapro to 10 mg daily. She states she tolerated the 5 mg daily and did increase her dose previously because she was feeling better on the 5.  She is recently noticing an increase in anxiety and waking up with panic attacks. Plan to increase Lexapro to 10 mg daily and continue with hydroxyzine as needed.          See Patient Instructions    Return in about 3 months (around 3/11/2020) for anxiety. or as needed     RONEL Garcia [facility]

## 2019-12-24 ENCOUNTER — OFFICE VISIT (OUTPATIENT)
Dept: OBGYN | Facility: OTHER | Age: 19
End: 2019-12-24
Attending: NURSE PRACTITIONER
Payer: OTHER GOVERNMENT

## 2019-12-24 VITALS
TEMPERATURE: 97.3 F | DIASTOLIC BLOOD PRESSURE: 62 MMHG | SYSTOLIC BLOOD PRESSURE: 106 MMHG | HEART RATE: 88 BPM | BODY MASS INDEX: 17.77 KG/M2 | WEIGHT: 88 LBS | OXYGEN SATURATION: 98 %

## 2019-12-24 DIAGNOSIS — Z30.432 ENCOUNTER FOR IUD REMOVAL: Primary | ICD-10-CM

## 2019-12-24 PROCEDURE — 58301 REMOVE INTRAUTERINE DEVICE: CPT | Performed by: NURSE PRACTITIONER

## 2019-12-24 ASSESSMENT — PAIN SCALES - GENERAL: PAINLEVEL: NO PAIN (0)

## 2019-12-24 NOTE — NURSING NOTE
"Chief Complaint   Patient presents with     Procedure     IUD removal       Initial Temp 97.3  F (36.3  C)   Wt 39.9 kg (88 lb)   BMI 17.77 kg/m   Estimated body mass index is 17.77 kg/m  as calculated from the following:    Height as of 6/7/19: 1.499 m (4' 11\").    Weight as of this encounter: 39.9 kg (88 lb).  Medication Reconciliation: complete  Jessa Behrman, LPN  "

## 2019-12-24 NOTE — PROGRESS NOTES
[facility]                HPI   Presenting for concerns of frequent cramping and continued irregular bleeding.  Requesting IUD removal and she will plan to continue on norethindrone for now.  If symptoms continue will call and request a change in Bcp.             Medications:     Current Outpatient Medications Ordered in Epic   Medication     albuterol (PROAIR HFA/PROVENTIL HFA/VENTOLIN HFA) 108 (90 Base) MCG/ACT inhaler     escitalopram (LEXAPRO) 10 MG tablet     hydrOXYzine (ATARAX) 25 MG tablet     norethindrone (MICRONOR) 0.35 MG tablet     No current Epic-ordered facility-administered medications on file.                 Allergies:   Apple; Cherry; and Pistachios [nuts]         Review of Systems:   The 5 point Review of Systems is negative other than noted in the HPI                     Physical Exam:   Blood pressure 106/62, pulse 88, temperature 97.3  F (36.3  C), weight 39.9 kg (88 lb), SpO2 98 %, not currently breastfeeding.  Constitutional:   awake, alert, cooperative, no apparent distress, and appears stated age     Genitounirinary:   External Genitalia:  General appearance; normal  Vagina:  Lesions absent  Cervix:  General appearance normal, IUD strings present.  Procedure:  After verbal consent was obtained from the patient,cervix was thoroughly swabbed with betadine, IUD strings were grasped with ring forcep and IUD easily removed intact with minimal patient discomfort noted.  No bleeding noted.  She will refrain from intercourse for 48 hours as a precautionary measure.  She may take ibuprofen 800 mg po every 8 hours prn for cramping. She is encouraged to call with any questions or concerns.             Assessment and Plan:   IUD removal - as above.     Dagmar King NP, MEHNAZ  12/24/2019  11:36 AM

## 2020-01-07 DIAGNOSIS — F41.9 ANXIETY: ICD-10-CM

## 2020-01-09 RX ORDER — ESCITALOPRAM OXALATE 10 MG/1
TABLET ORAL
Qty: 30 TABLET | Refills: 1 | Status: SHIPPED | OUTPATIENT
Start: 2020-01-09 | End: 2020-03-18

## 2020-03-02 ENCOUNTER — HEALTH MAINTENANCE LETTER (OUTPATIENT)
Age: 20
End: 2020-03-02

## 2020-03-16 DIAGNOSIS — F41.9 ANXIETY: ICD-10-CM

## 2020-03-18 RX ORDER — ESCITALOPRAM OXALATE 10 MG/1
TABLET ORAL
Qty: 30 TABLET | Refills: 2 | Status: SHIPPED | OUTPATIENT
Start: 2020-03-18 | End: 2020-06-22

## 2020-03-18 NOTE — TELEPHONE ENCOUNTER
Lexapro  Last Written Prescription Date: 1/9/20  Last Fill Quantity: 30 # of Refills: 1  Last Office Visit: 12/11/19

## 2020-04-09 DIAGNOSIS — N92.1 BREAKTHROUGH BLEEDING WITH IUD: ICD-10-CM

## 2020-04-09 DIAGNOSIS — Z97.5 BREAKTHROUGH BLEEDING WITH IUD: ICD-10-CM

## 2020-04-09 RX ORDER — ACETAMINOPHEN AND CODEINE PHOSPHATE 120; 12 MG/5ML; MG/5ML
SOLUTION ORAL
Qty: 84 TABLET | Refills: 0 | Status: SHIPPED | OUTPATIENT
Start: 2020-04-09 | End: 2020-07-06

## 2020-06-20 DIAGNOSIS — F41.9 ANXIETY: ICD-10-CM

## 2020-06-22 RX ORDER — ESCITALOPRAM OXALATE 10 MG/1
TABLET ORAL
Qty: 30 TABLET | Refills: 2 | Status: SHIPPED | OUTPATIENT
Start: 2020-06-22 | End: 2020-08-04

## 2020-07-03 DIAGNOSIS — Z97.5 BREAKTHROUGH BLEEDING WITH IUD: ICD-10-CM

## 2020-07-03 DIAGNOSIS — N92.1 BREAKTHROUGH BLEEDING WITH IUD: ICD-10-CM

## 2020-07-06 RX ORDER — NORETHINDRONE
KIT
Qty: 84 TABLET | Refills: 0 | Status: SHIPPED | OUTPATIENT
Start: 2020-07-06 | End: 2020-09-18

## 2020-07-06 NOTE — TELEPHONE ENCOUNTER
Contraception--Norlyda  Last Written Prescription Date: 4/9/20  Last Fill Quantity: 84 # of Refills: 0  Last Office Visit: 12/24/19

## 2020-08-03 NOTE — PROGRESS NOTES
"Trinh Hart is a 19 year old female who is being evaluated via a billable telephone visit.      The patient has been notified of following:     \"This telephone visit will be conducted via a call between you and your physician/provider. We have found that certain health care needs can be provided without the need for a physical exam.  This service lets us provide the care you need with a short phone conversation.  If a prescription is necessary we can send it directly to your pharmacy.  If lab work is needed we can place an order for that and you can then stop by our lab to have the test done at a later time.    Telephone visits are billed at different rates depending on your insurance coverage. During this emergency period, for some insurers they may be billed the same as an in-person visit.  Please reach out to your insurance provider with any questions.    If during the course of the call the physician/provider feels a telephone visit is not appropriate, you will not be charged for this service.\"    Patient has given verbal consent for Telephone visit?  Yes    What phone number would you like to be contacted at? [phone]    How would you like to obtain your AVS? MyChart    Subjective     Trinh Hart is a 19 year old female who presents via phone visit today for the following health issues:    HPI    Anxiety Follow-Up    How are you doing with your anxiety since your last visit? Improved with Lexapro- past few weeks getting worse    Are you having other symptoms that might be associated with anxiety? Yes:  heavy chest, \"sick feeling\"- \"panicing\"    Have you had a significant life event? OTHER: wedding planning, wearing a mask     Are you feeling depressed? No    Do you have any concerns with your use of alcohol or other drugs? No     Noticed she continues to have anxiety, but decreased panic attacks     She has increased stressors    hydroxyzine makes her tired     States she has extreme fatigue    She " started a new job and    Planning a wedding and moving in the near future     Counseling: Carrol Corbin [facility]     Medications:    Lexapro 10 mg daily    Social History     Tobacco Use     Smoking status: Never Smoker     Smokeless tobacco: Never Used   Substance Use Topics     Alcohol use: No     Drug use: No     GLORIA-7 SCORE 6/11/2019 11/7/2019 8/4/2020   Total Score - 12 (moderate anxiety) -   Total Score 14 12 5     PHQ 7/10/2018 6/11/2019 8/4/2020   PHQ-9 Total Score 2 1 7   Q9: Thoughts of better off dead/self-harm past 2 weeks Not at all Not at all Not at all     Last PHQ-9 8/4/2020   1.  Little interest or pleasure in doing things 1   2.  Feeling down, depressed, or hopeless 0   3.  Trouble falling or staying asleep, or sleeping too much 3   4.  Feeling tired or having little energy 3   5.  Poor appetite or overeating 0   6.  Feeling bad about yourself 0   7.  Trouble concentrating 0   8.  Moving slowly or restless 0   Q9: Thoughts of better off dead/self-harm past 2 weeks 0   PHQ-9 Total Score 7   Difficulty at work, home, or with people -     GLORIA-7  8/4/2020   1. Feeling nervous, anxious, or on edge 1   2. Not being able to stop or control worrying 2   3. Worrying too much about different things 2   4. Trouble relaxing 0   5. Being so restless that it is hard to sit still 0   6. Becoming easily annoyed or irritable 0   7. Feeling afraid, as if something awful might happen 0   GLORIA-7 Total Score 5   If you checked any problems, how difficult have they made it for you to do your work, take care of things at home, or get along with other people? -         How many servings of fruits and vegetables do you eat daily?  0-1    On average, how many sweetened beverages do you drink each day (Examples: soda, juice, sweet tea, etc.  Do NOT count diet or artificially sweetened beverages)?   1    How many days per week do you exercise enough to make your heart beat faster? 7    How many minutes a day do you  "exercise enough to make your heart beat faster? 30 - 60    How many days per week do you miss taking your medication? 0    01         Patient Active Problem List   Diagnosis     NO ACTIVE PROBLEMS     Adjustment disorder with depressed mood     PTSD (post-traumatic stress disorder)     Suicidal behavior     Thelarche, premature     Adjustment disorder with mixed disturbance of emotions and conduct     Anxiety     History reviewed. No pertinent surgical history.    Social History     Tobacco Use     Smoking status: Never Smoker     Smokeless tobacco: Never Used   Substance Use Topics     Alcohol use: No     Family History   Problem Relation Age of Onset     Mental Illness Mother      Mental Illness Father          Current Outpatient Medications   Medication Sig Dispense Refill     escitalopram (LEXAPRO) 10 MG tablet Take 2 tablets (20 mg) by mouth daily 60 tablet 3     hydrOXYzine (ATARAX) 25 MG tablet TAKE 1 TO 2 TABLETS(25 TO 50 MG) BY MOUTH EVERY 6 HOURS AS NEEDED FOR ANXIETY 60 tablet 0     NORLYDA 0.35 MG tablet TAKE 1 TABLET(0.35 MG) BY MOUTH DAILY 84 tablet 0     Allergies   Allergen Reactions     Apple      Gums/mouth gets itchy     Cherry      Gums/mouth gets itchy     Pistachios [Nuts]      BP Readings from Last 3 Encounters:   12/24/19 106/62   12/11/19 114/76   06/11/19 115/60    Wt Readings from Last 3 Encounters:   08/04/20 44.5 kg (98 lb)   12/24/19 39.9 kg (88 lb)   12/11/19 39.9 kg (88 lb)                    Reviewed and updated as needed this visit by Provider         Review of Systems   CONSTITUTIONAL: NEGATIVE for fever, chills, change in weight  RESP: NEGATIVE for significant cough or SOB  CV: NEGATIVE for chest pain, palpitations or peripheral edema  PSYCHIATRIC: increased stressors        Objective   Reported vitals:  Ht 1.499 m (4' 11\")   Wt 44.5 kg (98 lb)   BMI 19.79 kg/m     healthy, alert and no distress  PSYCH: Alert and oriented times 3; coherent speech, normal   rate and volume, able " to articulate logical thoughts, able   to abstract reason, no tangential thoughts, no hallucinations   or delusions  Her affect is normal and pleasant  RESP: No cough, no audible wheezing, able to talk in full sentences  Remainder of exam unable to be completed due to telephone visits    Diagnostic Test Results:  Labs reviewed in Epic        Assessment/Plan:    1. Anxiety  Plan to increase Lexapro, she plans on starting with 15 mg daily and if tolerating increase to 20 mg daily.  She is doing great on the lexapro, but with increased stressors at this time she would like to increase the lexapro.  We also discussed possible using a benzo because she states the hydroxyzine makes her tired. At  This time the plan is just increase the lexapro and use hydroxyzine as needed for panic attacks.   Follow up in a month   - escitalopram (LEXAPRO) 10 MG tablet; Take 2 tablets (20 mg) by mouth daily  Dispense: 60 tablet; Refill: 3  - Comprehensive metabolic panel (BMP + Alb, Alk Phos, ALT, AST, Total. Bili, TP); Future  - TSH with free T4 reflex; Future    2. Fatigue, unspecified type  Labs ordered due to increased fatigue - will notify once available   Possible increased fatigue due to increase stressors   - Comprehensive metabolic panel (BMP + Alb, Alk Phos, ALT, AST, Total. Bili, TP); Future  - CBC with platelets and differential; Future  - Vitamin D Deficiency; Future  - Vitamin B12; Future  - TSH with free T4 reflex; Future    3. Iron deficiency  History of iron deficiently and increased fatigue   Plan to check levels today    - Iron and iron binding capacity; Future  - Ferritin; Future    Return in about 4 weeks (around 9/1/2020) for anxiety.      Phone call duration:  12 minutes  Call started 8:27  Call ended 8:39    RONEL Garcia CNP

## 2020-08-04 ENCOUNTER — VIRTUAL VISIT (OUTPATIENT)
Dept: FAMILY MEDICINE | Facility: OTHER | Age: 20
End: 2020-08-04
Attending: NURSE PRACTITIONER
Payer: OTHER GOVERNMENT

## 2020-08-04 VITALS — BODY MASS INDEX: 19.76 KG/M2 | HEIGHT: 59 IN | WEIGHT: 98 LBS

## 2020-08-04 DIAGNOSIS — E61.1 IRON DEFICIENCY: ICD-10-CM

## 2020-08-04 DIAGNOSIS — F41.9 ANXIETY: ICD-10-CM

## 2020-08-04 DIAGNOSIS — R53.83 FATIGUE, UNSPECIFIED TYPE: Primary | ICD-10-CM

## 2020-08-04 PROCEDURE — 99214 OFFICE O/P EST MOD 30 MIN: CPT | Mod: 95 | Performed by: NURSE PRACTITIONER

## 2020-08-04 RX ORDER — ESCITALOPRAM OXALATE 10 MG/1
20 TABLET ORAL DAILY
Qty: 60 TABLET | Refills: 3 | Status: SHIPPED | OUTPATIENT
Start: 2020-08-04 | End: 2020-09-29

## 2020-08-04 ASSESSMENT — MIFFLIN-ST. JEOR: SCORE: 1120.16

## 2020-08-04 ASSESSMENT — ANXIETY QUESTIONNAIRES
6. BECOMING EASILY ANNOYED OR IRRITABLE: NOT AT ALL
GAD7 TOTAL SCORE: 5
7. FEELING AFRAID AS IF SOMETHING AWFUL MIGHT HAPPEN: NOT AT ALL
2. NOT BEING ABLE TO STOP OR CONTROL WORRYING: MORE THAN HALF THE DAYS
1. FEELING NERVOUS, ANXIOUS, OR ON EDGE: SEVERAL DAYS
5. BEING SO RESTLESS THAT IT IS HARD TO SIT STILL: NOT AT ALL
4. TROUBLE RELAXING: NOT AT ALL
3. WORRYING TOO MUCH ABOUT DIFFERENT THINGS: MORE THAN HALF THE DAYS

## 2020-08-04 ASSESSMENT — PAIN SCALES - GENERAL: PAINLEVEL: NO PAIN (0)

## 2020-08-04 ASSESSMENT — PATIENT HEALTH QUESTIONNAIRE - PHQ9: SUM OF ALL RESPONSES TO PHQ QUESTIONS 1-9: 7

## 2020-08-04 NOTE — Clinical Note
Please schedule for lab appointment (she can do in [place], she lives in [place])    Please schedule follow up appointment for anxiety (can be phone visit again) in 1 month     Brandi RODNEY FNP-BC  Family Nurse Practitioner

## 2020-08-05 ASSESSMENT — ANXIETY QUESTIONNAIRES: GAD7 TOTAL SCORE: 5

## 2020-08-06 DIAGNOSIS — E61.1 IRON DEFICIENCY: ICD-10-CM

## 2020-08-06 DIAGNOSIS — R53.83 FATIGUE, UNSPECIFIED TYPE: ICD-10-CM

## 2020-08-06 DIAGNOSIS — F41.9 ANXIETY: ICD-10-CM

## 2020-08-06 LAB
ALBUMIN SERPL-MCNC: 4.2 G/DL (ref 3.4–5)
ALP SERPL-CCNC: 72 U/L (ref 40–150)
ALT SERPL W P-5'-P-CCNC: 25 U/L (ref 0–50)
ANION GAP SERPL CALCULATED.3IONS-SCNC: 6 MMOL/L (ref 3–14)
AST SERPL W P-5'-P-CCNC: 19 U/L (ref 0–45)
BASOPHILS # BLD AUTO: 0 10E9/L (ref 0–0.2)
BASOPHILS NFR BLD AUTO: 0.4 %
BILIRUB SERPL-MCNC: 1.4 MG/DL (ref 0.2–1.3)
BUN SERPL-MCNC: 10 MG/DL (ref 7–30)
CALCIUM SERPL-MCNC: 9.4 MG/DL (ref 8.5–10.1)
CHLORIDE SERPL-SCNC: 109 MMOL/L (ref 94–109)
CO2 SERPL-SCNC: 26 MMOL/L (ref 20–32)
CREAT SERPL-MCNC: 0.68 MG/DL (ref 0.52–1.04)
DIFFERENTIAL METHOD BLD: NORMAL
EOSINOPHIL # BLD AUTO: 0.1 10E9/L (ref 0–0.7)
EOSINOPHIL NFR BLD AUTO: 2.8 %
ERYTHROCYTE [DISTWIDTH] IN BLOOD BY AUTOMATED COUNT: 12.1 % (ref 10–15)
FERRITIN SERPL-MCNC: 13 NG/ML (ref 12–150)
GFR SERPL CREATININE-BSD FRML MDRD: >90 ML/MIN/{1.73_M2}
GLUCOSE SERPL-MCNC: 83 MG/DL (ref 70–99)
HCT VFR BLD AUTO: 42.9 % (ref 35–47)
HGB BLD-MCNC: 14.4 G/DL (ref 11.7–15.7)
IRON SATN MFR SERPL: 50 % (ref 15–46)
IRON SERPL-MCNC: 183 UG/DL (ref 35–180)
LYMPHOCYTES # BLD AUTO: 1.7 10E9/L (ref 0.8–5.3)
LYMPHOCYTES NFR BLD AUTO: 35.9 %
MCH RBC QN AUTO: 31.6 PG (ref 26.5–33)
MCHC RBC AUTO-ENTMCNC: 33.6 G/DL (ref 31.5–36.5)
MCV RBC AUTO: 94 FL (ref 78–100)
MONOCYTES # BLD AUTO: 0.3 10E9/L (ref 0–1.3)
MONOCYTES NFR BLD AUTO: 5.6 %
NEUTROPHILS # BLD AUTO: 2.6 10E9/L (ref 1.6–8.3)
NEUTROPHILS NFR BLD AUTO: 55.3 %
PLATELET # BLD AUTO: 211 10E9/L (ref 150–450)
POTASSIUM SERPL-SCNC: 3.8 MMOL/L (ref 3.4–5.3)
PROT SERPL-MCNC: 7.7 G/DL (ref 6.8–8.8)
RBC # BLD AUTO: 4.56 10E12/L (ref 3.8–5.2)
SODIUM SERPL-SCNC: 141 MMOL/L (ref 133–144)
TIBC SERPL-MCNC: 364 UG/DL (ref 240–430)
TSH SERPL DL<=0.005 MIU/L-ACNC: 0.94 MU/L (ref 0.4–4)
VIT B12 SERPL-MCNC: 516 PG/ML (ref 193–986)
WBC # BLD AUTO: 4.6 10E9/L (ref 4–11)

## 2020-08-06 PROCEDURE — 83550 IRON BINDING TEST: CPT | Performed by: NURSE PRACTITIONER

## 2020-08-06 PROCEDURE — 82607 VITAMIN B-12: CPT | Performed by: NURSE PRACTITIONER

## 2020-08-06 PROCEDURE — 82306 VITAMIN D 25 HYDROXY: CPT | Performed by: NURSE PRACTITIONER

## 2020-08-06 PROCEDURE — 36415 COLL VENOUS BLD VENIPUNCTURE: CPT | Performed by: NURSE PRACTITIONER

## 2020-08-06 PROCEDURE — 83540 ASSAY OF IRON: CPT | Performed by: NURSE PRACTITIONER

## 2020-08-06 PROCEDURE — 80050 GENERAL HEALTH PANEL: CPT | Performed by: NURSE PRACTITIONER

## 2020-08-06 PROCEDURE — 82728 ASSAY OF FERRITIN: CPT | Performed by: NURSE PRACTITIONER

## 2020-08-07 LAB — DEPRECATED CALCIDIOL+CALCIFEROL SERPL-MC: 34 UG/L (ref 20–75)

## 2020-09-18 ENCOUNTER — MYC MEDICAL ADVICE (OUTPATIENT)
Dept: OBGYN | Facility: OTHER | Age: 20
End: 2020-09-18

## 2020-09-28 DIAGNOSIS — N92.1 BREAKTHROUGH BLEEDING WITH IUD: ICD-10-CM

## 2020-09-28 DIAGNOSIS — Z97.5 BREAKTHROUGH BLEEDING WITH IUD: ICD-10-CM

## 2020-09-28 NOTE — TELEPHONE ENCOUNTER
Ellis  Last Written Prescription Date: 7/6/20, has been discontinued  Last Fill Quantity: 84 # of Refills: 0  Last Office Visit: 8/4/20

## 2020-09-29 DIAGNOSIS — F41.9 ANXIETY: ICD-10-CM

## 2020-09-29 RX ORDER — NORETHINDRONE
KIT
Qty: 84 TABLET | Refills: 0 | Status: SHIPPED | OUTPATIENT
Start: 2020-09-29 | End: 2021-04-13

## 2020-09-29 RX ORDER — ESCITALOPRAM OXALATE 10 MG/1
TABLET ORAL
Qty: 30 TABLET | Refills: 3 | Status: SHIPPED | OUTPATIENT
Start: 2020-09-29 | End: 2021-05-05

## 2020-09-29 NOTE — TELEPHONE ENCOUNTER
Lexapro  Last Written Prescription Date: 8/4/20  Last Fill Quantity: 60 # of Refills: 3  Last Office Visit: 8/4/20

## 2020-12-20 ENCOUNTER — HEALTH MAINTENANCE LETTER (OUTPATIENT)
Age: 20
End: 2020-12-20

## 2021-04-13 ENCOUNTER — APPOINTMENT (OUTPATIENT)
Dept: LAB | Facility: OTHER | Age: 21
End: 2021-04-13
Attending: NURSE PRACTITIONER
Payer: COMMERCIAL

## 2021-04-13 ENCOUNTER — OFFICE VISIT (OUTPATIENT)
Dept: FAMILY MEDICINE | Facility: OTHER | Age: 21
End: 2021-04-13
Attending: NURSE PRACTITIONER
Payer: COMMERCIAL

## 2021-04-13 VITALS
WEIGHT: 113 LBS | DIASTOLIC BLOOD PRESSURE: 70 MMHG | TEMPERATURE: 98.1 F | HEART RATE: 102 BPM | BODY MASS INDEX: 22.82 KG/M2 | SYSTOLIC BLOOD PRESSURE: 102 MMHG | OXYGEN SATURATION: 99 %

## 2021-04-13 DIAGNOSIS — R30.0 DYSURIA: Primary | ICD-10-CM

## 2021-04-13 LAB
ALBUMIN UR-MCNC: ABNORMAL MG/DL
APPEARANCE UR: CLEAR
BILIRUB UR QL STRIP: 0
COLOR UR AUTO: ABNORMAL
GLUCOSE UR STRIP-MCNC: ABNORMAL MG/DL
HGB UR QL STRIP: ABNORMAL
KETONES UR STRIP-MCNC: ABNORMAL MG/DL
LEUKOCYTE ESTERASE UR QL STRIP: ABNORMAL
NITRATE UR QL: ABNORMAL
PH UR STRIP: ABNORMAL PH (ref 5–7)
SOURCE: ABNORMAL
SP GR UR STRIP: ABNORMAL (ref 1–1.03)
UROBILINOGEN UR STRIP-MCNC: ABNORMAL MG/DL (ref 0–2)

## 2021-04-13 PROCEDURE — 81003 URINALYSIS AUTO W/O SCOPE: CPT | Mod: ZL | Performed by: NURSE PRACTITIONER

## 2021-04-13 PROCEDURE — G0463 HOSPITAL OUTPT CLINIC VISIT: HCPCS

## 2021-04-13 PROCEDURE — 99213 OFFICE O/P EST LOW 20 MIN: CPT | Performed by: NURSE PRACTITIONER

## 2021-04-13 RX ORDER — NITROFURANTOIN 25; 75 MG/1; MG/1
100 CAPSULE ORAL 2 TIMES DAILY
Qty: 6 CAPSULE | Refills: 0 | Status: SHIPPED | OUTPATIENT
Start: 2021-04-13 | End: 2021-04-16

## 2021-04-13 ASSESSMENT — ANXIETY QUESTIONNAIRES
4. TROUBLE RELAXING: NOT AT ALL
3. WORRYING TOO MUCH ABOUT DIFFERENT THINGS: NOT AT ALL
5. BEING SO RESTLESS THAT IT IS HARD TO SIT STILL: NOT AT ALL
7. FEELING AFRAID AS IF SOMETHING AWFUL MIGHT HAPPEN: NOT AT ALL
6. BECOMING EASILY ANNOYED OR IRRITABLE: NOT AT ALL
1. FEELING NERVOUS, ANXIOUS, OR ON EDGE: NOT AT ALL
GAD7 TOTAL SCORE: 0
2. NOT BEING ABLE TO STOP OR CONTROL WORRYING: NOT AT ALL

## 2021-04-13 ASSESSMENT — PATIENT HEALTH QUESTIONNAIRE - PHQ9: SUM OF ALL RESPONSES TO PHQ QUESTIONS 1-9: 0

## 2021-04-13 ASSESSMENT — PAIN SCALES - GENERAL: PAINLEVEL: NO PAIN (1)

## 2021-04-13 NOTE — PROGRESS NOTES
Assessment & Plan     Dysuria  UA contaminated due to AZO.  Plan to treat with 3 days of antibiotic.  Follow up next week if symptoms do not improve.  To the ER if she develops a consistent back pain or fevers.   She did have some improvement of her symptoms with the AZO.  - UA reflex to Microscopic and Culture - [place]  - nitroFURantoin macrocrystal-monohydrate (MACROBID) 100 MG capsule; Take 1 capsule (100 mg) by mouth 2 times daily for 3 days     Trinh agrees with plan today   See Patient Instructions    No follow-ups on file.    RONEL Garcia [facility] - [place]    Subjective   Trinh is a 20 year old who presents for the following health issues     HPI     Genitourinary - Female  Onset/Duration: 4 days  Description:   Painful urination (Dysuria): YES           Frequency: YES  Blood in urine (Hematuria): YES  Delay in urine (Hesitency): YES  Intensity: moderate  Progression of Symptoms:  worsening  Accompanying Signs & Symptoms:  Fever/chills: no  Flank pain: YES  Nausea and vomiting: no  Vaginal symptoms: none  Abdominal/Pelvic Pain: YES  History:   History of frequent UTI s: YES  History of kidney stones: no  Sexually Active: YES  Possibility of pregnancy: Don't Know  Precipitating or alleviating factors: None  Therapies tried and outcome:  took AZO this morning- one dose    LMS 3/22/2021        Review of Systems   CONSTITUTIONAL: NEGATIVE for fever, chills, change in weight  INTEGUMENTARY/SKIN: NEGATIVE for worrisome rashes, moles or lesions  RESP: NEGATIVE for significant cough or SOB  CV: NEGATIVE for chest pain, palpitations or peripheral edema  GI: NEGATIVE for nausea, abdominal pain, heartburn, or change in bowel habits  : dysuria and hematuria      Objective    /70   Pulse 102   Temp 98.1  F (36.7  C) (Tympanic)   Wt 51.3 kg (113 lb)   SpO2 99%   BMI 22.82 kg/m    Body mass index is 22.82 kg/m .  Physical Exam   GENERAL: healthy, alert and no  distress  RESP: lungs clear to auscultation - no rales, rhonchi or wheezes  CV: regular rate and rhythm, normal S1 S2, no S3 or S4, no murmur, click or rub, no peripheral edema and peripheral pulses strong  ABDOMEN: bowel sounds normal and suprapubic pressure   PSYCH: mentation appears normal, affect normal/bright    Results for orders placed or performed in visit on 04/13/21   UA reflex to Microscopic and Culture - [place]     Status: Abnormal    Specimen: Midstream Urine   Result Value Ref Range    Color Urine Orange     Appearance Urine Clear     Glucose Urine COLOR INTERFERENCE, UNABLE TO REPORT MACROSCOPIC (A) NEG^Negative mg/dL    Bilirubin Urine 0 (A) NEG^Negative    Ketones Urine COLOR INTERFERENCE, UNABLE TO REPORT MACROSCOPIC (A) NEG^Negative mg/dL    Specific Gravity Urine COLOR INTERFERENCE, UNABLE TO REPORT MACROSCOPIC 1.003 - 1.035    Blood Urine COLOR INTERFERENCE, UNABLE TO REPORT MACROSCOPIC (A) NEG^Negative    pH Urine COLOR INTERFERENCE, UNABLE TO REPORT MACROSCOPIC 5.0 - 7.0 pH    Protein Albumin Urine COLOR INTERFERENCE, UNABLE TO REPORT MACROSCOPIC (A) NEG^Negative mg/dL    Urobilinogen mg/dL COLOR INTERFERENCE, UNABLE TO REPORT MACROSCOPIC 0.0 - 2.0 mg/dL    Nitrite Urine COLOR INTERFERENCE, UNABLE TO REPORT MACROSCOPIC (A) NEG^Negative    Leukocyte Esterase Urine COLOR INTERFERENCE, UNABLE TO REPORT MACROSCOPIC (A) NEG^Negative    Source Midstream Urine

## 2021-04-13 NOTE — NURSING NOTE
"Chief Complaint   Patient presents with     Dysuria       Initial /70   Pulse 102   Temp 98.1  F (36.7  C) (Tympanic)   Wt 51.3 kg (113 lb)   SpO2 99%   BMI 22.82 kg/m   Estimated body mass index is 22.82 kg/m  as calculated from the following:    Height as of 8/4/20: 1.499 m (4' 11\").    Weight as of this encounter: 51.3 kg (113 lb).  Medication Reconciliation: complete  Kori Murdock LPN  "

## 2021-04-13 NOTE — PATIENT INSTRUCTIONS
Patient Education     Dysuria  Dysuria is when you have pain during urination. It is often described as a burning feeling. Learn more about this problem and how it can be treated.     Painful urination (dysuria) is often caused by a problem in the urinary tract.   What causes dysuria?  Possible causes include:    Infection with a bacteria or virus such as a urinary tract infection (UTI) or a sexually transmitted infection (STI)    Sensitivity or allergy to chemicals such as those found in lotions and other products    Prostate or bladder problems    Radiation therapy to the pelvic area  How is dysuria diagnosed?  Your healthcare provider will examine you. He or she will ask about your symptoms and health. After talking with you and doing a physical exam, your healthcare provider may know what is causing your dysuria. You will often have to give a urine sample. Tests of your urine (urinalysis) are done. A urinalysis may include:    Looking at the urine sample (visual exam)    Checking for substances (chemical exam)    Looking at a small amount of the urine under a microscope (microscopic exam)  Some parts of the urinalysis may be done in the provider's office and some in a lab. The urine sample may also be checked for bacteria and yeast (urine culture). Your healthcare provider will tell you more about these tests if they are needed.  How is dysuria treated?  Treatment depends on the cause. If you have a bacterial infection, you may need antibiotics. You may be given medicines to make it easier for you to urinate and help ease pain. Your healthcare provider can tell you more about your treatment options. If not treated, symptoms may get worse.  When to call your healthcare provider  Call the healthcare provider right away if you have any of the following:    Fever of  100.4  F ( 38 C) or higher, or as directed by your provider    No improvement after 3 days of treatment    Trouble urinating because of pain    New or  increased discharge from the vagina or penis    Rash or joint pain    Increased back or belly pain    Enlarged painful lymph nodes (lumps) in the groin  [employer] last reviewed this educational content on 4/1/2019 2000-2021 [employer]. All rights reserved. This information is not intended as a substitute for professional medical care. Always follow your healthcare professional's instructions.

## 2021-04-14 ASSESSMENT — ANXIETY QUESTIONNAIRES: GAD7 TOTAL SCORE: 0

## 2021-04-18 ENCOUNTER — HEALTH MAINTENANCE LETTER (OUTPATIENT)
Age: 21
End: 2021-04-18

## 2021-04-21 ENCOUNTER — HOSPITAL ENCOUNTER (EMERGENCY)
Facility: HOSPITAL | Age: 21
Discharge: HOME | End: 2021-04-21
Attending: NURSE PRACTITIONER | Admitting: NURSE PRACTITIONER
Payer: COMMERCIAL

## 2021-04-21 VITALS
RESPIRATION RATE: 16 BRPM | OXYGEN SATURATION: 99 % | HEART RATE: 82 BPM | DIASTOLIC BLOOD PRESSURE: 70 MMHG | SYSTOLIC BLOOD PRESSURE: 110 MMHG | TEMPERATURE: 98.4 F

## 2021-04-21 DIAGNOSIS — R30.0 DYSURIA: Primary | ICD-10-CM

## 2021-04-21 LAB
ALBUMIN UR-MCNC: NEGATIVE MG/DL
APPEARANCE UR: ABNORMAL
BACTERIA #/AREA URNS HPF: ABNORMAL /HPF
BILIRUB UR QL STRIP: NEGATIVE
COLOR UR AUTO: YELLOW
GLUCOSE UR STRIP-MCNC: NEGATIVE MG/DL
HCG UR QL: NEGATIVE
HGB UR QL STRIP: NEGATIVE
KETONES UR STRIP-MCNC: NEGATIVE MG/DL
LEUKOCYTE ESTERASE UR QL STRIP: NEGATIVE
MUCOUS THREADS #/AREA URNS LPF: PRESENT /LPF
NITRATE UR QL: NEGATIVE
PH UR STRIP: 5.5 PH (ref 4.7–8)
RBC #/AREA URNS AUTO: 6 /HPF (ref 0–2)
SOURCE: ABNORMAL
SP GR UR STRIP: 1.02 (ref 1–1.03)
SPECIMEN SOURCE: NORMAL
SQUAMOUS #/AREA URNS AUTO: 21 /HPF (ref 0–1)
UROBILINOGEN UR STRIP-MCNC: NORMAL MG/DL (ref 0–2)
WBC #/AREA URNS AUTO: 3 /HPF (ref 0–5)
WET PREP SPEC: NORMAL

## 2021-04-21 PROCEDURE — 81001 URINALYSIS AUTO W/SCOPE: CPT | Performed by: NURSE PRACTITIONER

## 2021-04-21 PROCEDURE — G0463 HOSPITAL OUTPT CLINIC VISIT: HCPCS

## 2021-04-21 PROCEDURE — 81025 URINE PREGNANCY TEST: CPT | Performed by: NURSE PRACTITIONER

## 2021-04-21 PROCEDURE — 87210 SMEAR WET MOUNT SALINE/INK: CPT | Performed by: NURSE PRACTITIONER

## 2021-04-21 PROCEDURE — 99213 OFFICE O/P EST LOW 20 MIN: CPT | Performed by: NURSE PRACTITIONER

## 2021-04-21 ASSESSMENT — ENCOUNTER SYMPTOMS
HEMATURIA: 1
FLANK PAIN: 0
DYSURIA: 1
FREQUENCY: 0

## 2021-04-21 NOTE — ED TRIAGE NOTES
Pt in for evaluation of UTI. Was diagnosed with UTI on 4/10 and given a 3 day course of abx. Concerned as she is still having some burning with urination.

## 2021-04-21 NOTE — DISCHARGE INSTRUCTIONS
Drink plenty of fluids to stay hydrated.     Take AZO, tylenol or ibuprofen as needed for the discomfort.    Schedule an appointment with your OBGYN for reevaluation    Return to emergency department for worsening or concerning symptoms.

## 2021-04-21 NOTE — ED PROVIDER NOTES
History     Chief Complaint   Patient presents with     UTI     HPI  Trinh Hart is a 20 year old female who presents to urgent care for concerns of UTI.  Patient states she started getting UTI symptoms on 4/9/2021.  She was seen by PCP on 4/13/2021 and started on Macrobid x3 days.  Urine at that time was inconclusive due to use of Azo.  Patient notes that there was a brief improvement in symptoms.  Once antibiotic was completed symptoms returned again.  She did have blood in her urine for 2 days but that has since resolved.  She reports a constant burning sensation.  No abnormal vaginal discharge.  No rash, lesions to groin area.  No flank pain, fever, chills, abdominal pain.  No concern for STIs.    Possible chance of pregnancy.    Allergies:  Allergies   Allergen Reactions     Apple      Gums/mouth gets itchy     Cherry      Gums/mouth gets itchy     Pistachios [Nuts]        Problem List:    Patient Active Problem List    Diagnosis Date Noted     Anxiety 12/11/2019     Priority: Medium     Adjustment disorder with depressed mood 01/22/2018     Priority: Medium     PTSD (post-traumatic stress disorder) 01/22/2018     Priority: Medium     NO ACTIVE PROBLEMS 04/24/2017     Priority: Medium     Suicidal behavior 05/08/2016     Priority: Medium     Adjustment disorder with mixed disturbance of emotions and conduct 05/08/2016     Priority: Medium     Thelarche, premature 07/29/2008     Priority: Medium     Overview:   Asymmetric.  Slight nipple enlargement on left side.  Right side has breast tissue which is probably 3-4 cm.  IMO Update 10/11          Past Medical History:    History reviewed. No pertinent past medical history.    Past Surgical History:    History reviewed. No pertinent surgical history.    Family History:    Family History   Problem Relation Age of Onset     Mental Illness Mother      Mental Illness Father        Social History:  Marital Status:  Single [1]  Social History     Tobacco Use      Smoking status: Never Smoker     Smokeless tobacco: Never Used   Substance Use Topics     Alcohol use: No     Drug use: No        Medications:    escitalopram (LEXAPRO) 10 MG tablet  hydrOXYzine (ATARAX) 25 MG tablet          Review of Systems   Genitourinary: Positive for dysuria and hematuria (Resolved). Negative for flank pain, frequency, genital sores and urgency.   All other systems reviewed and are negative.      Physical Exam   BP: 110/70  Pulse: 82  Temp: 98.4  F (36.9  C)  Resp: 16  SpO2: 99 %      Physical Exam  Vitals signs and nursing note reviewed.   Constitutional:       Appearance: Normal appearance. She is not ill-appearing or toxic-appearing.   HENT:      Head: Normocephalic.   Eyes:      Pupils: Pupils are equal, round, and reactive to light.   Neck:      Musculoskeletal: Neck supple.   Cardiovascular:      Rate and Rhythm: Normal rate and regular rhythm.      Heart sounds: Normal heart sounds.   Pulmonary:      Effort: Pulmonary effort is normal.      Breath sounds: Normal breath sounds.   Abdominal:      General: Bowel sounds are normal.      Palpations: Abdomen is soft.      Tenderness: There is no abdominal tenderness. There is no right CVA tenderness, left CVA tenderness, guarding or rebound.   Genitourinary:     Comments: Deferred  Skin:     General: Skin is warm and dry.      Capillary Refill: Capillary refill takes less than 2 seconds.   Neurological:      Mental Status: She is alert and oriented to person, place, and time.         ED Course        Procedures       Results for orders placed or performed during the hospital encounter of 04/21/21 (from the past 24 hour(s))   UA with Microscopic reflex to Culture    Specimen: Midstream Urine   Result Value Ref Range    Color Urine Yellow     Appearance Urine Slightly Cloudy     Glucose Urine Negative NEG^Negative mg/dL    Bilirubin Urine Negative NEG^Negative    Ketones Urine Negative NEG^Negative mg/dL    Specific Gravity Urine 1.023 1.003 -  1.035    Blood Urine Negative NEG^Negative    pH Urine 5.5 4.7 - 8.0 pH    Protein Albumin Urine Negative NEG^Negative mg/dL    Urobilinogen mg/dL Normal 0.0 - 2.0 mg/dL    Nitrite Urine Negative NEG^Negative    Leukocyte Esterase Urine Negative NEG^Negative    Source Midstream Urine     WBC Urine 3 0 - 5 /HPF    RBC Urine 6 (H) 0 - 2 /HPF    Bacteria Urine Few (A) NEG^Negative /HPF    Squamous Epithelial /HPF Urine 21 (H) 0 - 1 /HPF    Mucous Urine Present (A) NEG^Negative /LPF   HCG qualitative urine   Result Value Ref Range    HCG Qual Urine Negative NEG^Negative   Wet prep    Specimen: Vagina   Result Value Ref Range    Specimen Description Vagina     Wet Prep Rare  PMNs seen       Wet Prep No Trichomonas seen     Wet Prep No clue cells seen     Wet Prep No yeast seen        Medications - No data to display    Assessments & Plan (with Medical Decision Making)     I have reviewed the nursing notes.    I have reviewed the findings, diagnosis, plan and need for follow up with the patient.  20-year-old female that presented with concern of dysuria x2 weeks.  Treated with Macrobid for 3 days with symptoms briefly improving.  Soft and nontender abdomen on palpation.  No CVA tenderness.  Vital signs are stable.  Urinalysis negative for infection.  Negative for yeast, trichomonas or BV.  Negative pregnancy.  Results were discussed with patient.  Unsure the cause of her symptoms at this time.  Recommended pushing fluids, Azo for the discomfort.  Advised her to schedule an appointment with her OB/GYN for reevaluation.  Return to ED/UC for worsening or concerning symptoms.  Patient verbalized understanding.    This document was prepared using a combination of typing and voice generated software.  While every attempt was made for accuracy, spelling and grammatical errors may exist.    New Prescriptions    No medications on file       Final diagnoses:   Dysuria       4/21/2021   [facility]     Mpofu, Prudence,  CNP  04/21/21 1301
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
## 2021-05-03 ENCOUNTER — OFFICE VISIT (OUTPATIENT)
Dept: FAMILY MEDICINE | Facility: OTHER | Age: 21
End: 2021-05-03
Attending: NURSE PRACTITIONER
Payer: COMMERCIAL

## 2021-05-03 VITALS
DIASTOLIC BLOOD PRESSURE: 68 MMHG | TEMPERATURE: 97.7 F | OXYGEN SATURATION: 98 % | SYSTOLIC BLOOD PRESSURE: 100 MMHG | WEIGHT: 113 LBS | HEIGHT: 59 IN | HEART RATE: 83 BPM | BODY MASS INDEX: 22.78 KG/M2

## 2021-05-03 DIAGNOSIS — Z00.00 ROUTINE GENERAL MEDICAL EXAMINATION AT A HEALTH CARE FACILITY: Primary | ICD-10-CM

## 2021-05-03 DIAGNOSIS — N92.6 MISSED PERIOD: ICD-10-CM

## 2021-05-03 DIAGNOSIS — D22.9 ATYPICAL MOLE: ICD-10-CM

## 2021-05-03 LAB — B-HCG SERPL-ACNC: <1 IU/L (ref 0–5)

## 2021-05-03 PROCEDURE — 36415 COLL VENOUS BLD VENIPUNCTURE: CPT | Mod: ZL | Performed by: NURSE PRACTITIONER

## 2021-05-03 PROCEDURE — G0463 HOSPITAL OUTPT CLINIC VISIT: HCPCS

## 2021-05-03 PROCEDURE — G0123 SCREEN CERV/VAG THIN LAYER: HCPCS | Mod: ZL | Performed by: NURSE PRACTITIONER

## 2021-05-03 PROCEDURE — 99395 PREV VISIT EST AGE 18-39: CPT | Performed by: NURSE PRACTITIONER

## 2021-05-03 PROCEDURE — 84702 CHORIONIC GONADOTROPIN TEST: CPT | Mod: ZL | Performed by: NURSE PRACTITIONER

## 2021-05-03 ASSESSMENT — ANXIETY QUESTIONNAIRES
2. NOT BEING ABLE TO STOP OR CONTROL WORRYING: NOT AT ALL
5. BEING SO RESTLESS THAT IT IS HARD TO SIT STILL: NOT AT ALL
1. FEELING NERVOUS, ANXIOUS, OR ON EDGE: NOT AT ALL
7. FEELING AFRAID AS IF SOMETHING AWFUL MIGHT HAPPEN: NOT AT ALL
GAD7 TOTAL SCORE: 0
6. BECOMING EASILY ANNOYED OR IRRITABLE: NOT AT ALL
4. TROUBLE RELAXING: NOT AT ALL
3. WORRYING TOO MUCH ABOUT DIFFERENT THINGS: NOT AT ALL

## 2021-05-03 ASSESSMENT — PATIENT HEALTH QUESTIONNAIRE - PHQ9: SUM OF ALL RESPONSES TO PHQ QUESTIONS 1-9: 0

## 2021-05-03 ASSESSMENT — MIFFLIN-ST. JEOR: SCORE: 1183.19

## 2021-05-03 ASSESSMENT — PAIN SCALES - GENERAL: PAINLEVEL: NO PAIN (0)

## 2021-05-03 NOTE — NURSING NOTE
"Chief Complaint   Patient presents with     Physical       Initial /68   Pulse 83   Temp 97.7  F (36.5  C) (Tympanic)   Ht 1.499 m (4' 11\")   Wt 51.3 kg (113 lb)   SpO2 98%   BMI 22.82 kg/m   Estimated body mass index is 22.82 kg/m  as calculated from the following:    Height as of this encounter: 1.499 m (4' 11\").    Weight as of this encounter: 51.3 kg (113 lb).  Medication Reconciliation: complete  Kori Murdock LPN  "

## 2021-05-04 ASSESSMENT — ANXIETY QUESTIONNAIRES: GAD7 TOTAL SCORE: 0

## 2021-05-05 DIAGNOSIS — F41.9 ANXIETY: ICD-10-CM

## 2021-05-05 RX ORDER — ESCITALOPRAM OXALATE 5 MG/1
5 TABLET ORAL DAILY
Qty: 30 TABLET | Refills: 0 | Status: SHIPPED | OUTPATIENT
Start: 2021-05-05 | End: 2021-06-15

## 2021-05-05 NOTE — TELEPHONE ENCOUNTER
Can you set her up with a phone visit in about a month for follow up anxiety and pregnancy     Brandi RODNEY FNP-BC  Family Nurse Practitioner

## 2021-05-05 NOTE — TELEPHONE ENCOUNTER
escitalopram (LEXAPRO) 10 MG tablet      Last Written Prescription Date:  9/29/20  Last Fill Quantity: 30,   # refills: 3  Last Office Visit: 5/3/21  Future Office visit:

## 2021-05-06 LAB
COPATH REPORT: NORMAL
PAP: NORMAL

## 2021-05-10 RX ORDER — ESCITALOPRAM OXALATE 10 MG/1
5 TABLET ORAL DAILY
COMMUNITY
Start: 2021-04-08 | End: 2021-06-07

## 2021-05-11 ENCOUNTER — OFFICE VISIT (OUTPATIENT)
Dept: SURGERY | Facility: OTHER | Age: 21
End: 2021-05-11
Attending: NURSE PRACTITIONER
Payer: COMMERCIAL

## 2021-05-11 VITALS
DIASTOLIC BLOOD PRESSURE: 66 MMHG | OXYGEN SATURATION: 98 % | HEART RATE: 80 BPM | TEMPERATURE: 98.1 F | RESPIRATION RATE: 16 BRPM | BODY MASS INDEX: 22.94 KG/M2 | SYSTOLIC BLOOD PRESSURE: 98 MMHG | HEIGHT: 59 IN | WEIGHT: 113.8 LBS

## 2021-05-11 DIAGNOSIS — D22.9 ATYPICAL MOLE: ICD-10-CM

## 2021-05-11 PROCEDURE — G0463 HOSPITAL OUTPT CLINIC VISIT: HCPCS

## 2021-05-11 ASSESSMENT — MIFFLIN-ST. JEOR: SCORE: 1186.82

## 2021-05-11 ASSESSMENT — PAIN SCALES - GENERAL: PAINLEVEL: NO PAIN (0)

## 2021-05-11 NOTE — NURSING NOTE
"Chief Complaint   Patient presents with     Consult     atypical mole right shoulder       Initial BP 98/66   Pulse 80   Temp 98.1  F (36.7  C) (Tympanic)   Resp 16   Ht 1.499 m (4' 11\")   Wt 51.6 kg (113 lb 12.8 oz)   SpO2 98%   BMI 22.98 kg/m   Estimated body mass index is 22.98 kg/m  as calculated from the following:    Height as of this encounter: 1.499 m (4' 11\").    Weight as of this encounter: 51.6 kg (113 lb 12.8 oz).  Medication Reconciliation: complete  Tresa Florez LPN  "

## 2021-05-11 NOTE — PROGRESS NOTES
Appointment was canceled.  We will reschedule the patient for [place] at which time will excise the lesions in clinic

## 2021-05-11 NOTE — PATIENT INSTRUCTIONS
Thank you for allowing Dr. Hoang and our surgical team to participate in your care. Please call our health unit coordinator at [phone] with scheduling questions or the nurse at [phone] with any other questions or concerns.

## 2021-06-04 ENCOUNTER — NURSE TRIAGE (OUTPATIENT)
Dept: FAMILY MEDICINE | Facility: OTHER | Age: 21
End: 2021-06-04

## 2021-06-04 NOTE — PROGRESS NOTES
Trinh is a 20 year old who is being evaluated via a billable telephone visit.      What phone number would you like to be contacted at? [phone]  How would you like to obtain your AVS? MyChart    Assessment & Plan     Anxiety  Try weaning/staying off Lexapro   - escitalopram (LEXAPRO) 5 MG tablet; Take 1 tablet (5 mg) by mouth daily       See Patient Instructions    No follow-ups on file.    RONEL Garcia [facility] [place]    Subjective   Trinh is a 20 year old who presents for the following health issues     HPI     Anxiety Follow-Up    How are you doing with your anxiety since your last visit? No change    Are you having other symptoms that might be associated with anxiety? No    Have you had a significant life event? No     Are you feeling depressed? No    Do you have any concerns with your use of alcohol or other drugs? No     Continuing with Lexapro for now, consider weaning off in the future - has not taking for the past 2 days, but did request refill today    Counseling with Mary at [facility]  Trying to get pregnant has not had menstrual cycle since March. LMS 3/22/21. Pregnancy test on 5/3/21 negative.   She states 3 positive on clear blue pregnancy test, but all other brands have been negative.  She does have an appointment with OB     She states she has multiple warts on her feet.  She has tried OTC treatment, but has not done any scraping.  Discussed options with otc treatment, podiatry or coming into clinic     Social History     Tobacco Use     Smoking status: Never Smoker     Smokeless tobacco: Never Used   Substance Use Topics     Alcohol use: No     Drug use: No     GLORIA-7 SCORE 4/13/2021 5/3/2021 6/7/2021   Total Score - - -   Total Score 0 0 0     PHQ 8/4/2020 4/13/2021 5/3/2021   PHQ-9 Total Score 7 0 0   Q9: Thoughts of better off dead/self-harm past 2 weeks Not at all Not at all Not at all     Last PHQ-9 6/7/2021   1.  Little interest or  pleasure in doing things 0   2.  Feeling down, depressed, or hopeless 0   3.  Trouble falling or staying asleep, or sleeping too much 0   4.  Feeling tired or having little energy 0   5.  Poor appetite or overeating 0   6.  Feeling bad about yourself 0   7.  Trouble concentrating 0   8.  Moving slowly or restless 0   Q9: Thoughts of better off dead/self-harm past 2 weeks -   PHQ-9 Total Score -   Difficulty at work, home, or with people Not difficult at all     GLORIA-7  5/3/2021   1. Feeling nervous, anxious, or on edge 0   2. Not being able to stop or control worrying 0   3. Worrying too much about different things 0   4. Trouble relaxing 0   5. Being so restless that it is hard to sit still 0   6. Becoming easily annoyed or irritable 0   7. Feeling afraid, as if something awful might happen 0   GLORIA-7 Total Score 0   If you checked any problems, how difficult have they made it for you to do your work, take care of things at home, or get along with other people? -         Review of Systems   CONSTITUTIONAL: NEGATIVE for fever, chills, change in weight  INTEGUMENTARY/SKIN: eczema   RESP: Laughing - becomes SOB and irritation - this is been going on for years  CV: NEGATIVE for chest pain, palpitations or peripheral edema  GI: NEGATIVE for nausea, abdominal pain, heartburn, or change in bowel habits  : abnormal menstrual cycles and denies dysuria  LMS 3/22/21   PSYCHIATRIC: HX anxiety and HX depression      Objective           Vitals:  No vitals were obtained today due to virtual visit.    Physical Exam   healthy, alert and no distress  PSYCH: Alert and oriented times 3; coherent speech, normal   rate and volume, able to articulate logical thoughts, able   to abstract reason, no tangential thoughts, no hallucinations   or delusions  Her affect is normal and pleasant  RESP: No cough, no audible wheezing, able to talk in full sentences  Remainder of exam unable to be completed due to telephone visits    Office Visit on  05/03/2021   Component Date Value Ref Range Status     HCG Quantitative Serum 05/03/2021 <1  0 - 5 IU/L Final     PAP 05/03/2021 NIL   Final     Copath Report 05/03/2021    Final                    Value:  Patient Name: DEVORA BROWNE  MR#: [id number]  Specimen #: [id number]  Collected: 5/3/2021  Received: 5/5/2021  Reported: 5/6/2021 08:08  Ordering Phy(s): ONI CARVALHO    For improved result formatting, select 'View Enhanced Report Format' under   Linked Documents section.    SPECIMEN/STAIN PROCESS:  Pap thin layer prep screening ([facility])       Pap-Cyto x 1    SOURCE: Cervical  ----------------------------------------------------------------   Pap thin layer prep screening ([facility])  SPECIMEN ADEQUACY:  Satisfactory for evaluation.  -Transformation zone component absent.    CYTOLOGIC INTERPRETATION:    Negative for intraepithelial lesion or malignancy    Electronically signed out by:  MATHEW Zamorano (ASCP)    CLINICAL HISTORY:    Papanicolaou Test Limitations:  Cervical cytology is a screening test with   limited sensitivity; regular  screening is critical for cancer prevention; Pap tests are primarily   effective for the diagnosis/prevention of  squamous cell carci                          noma, not adenocarcinomas or other cancers.    COLLECTION SITE:  Client:  [facility]  Location: [facility] (B)    The technical component of this testing was completed at [facility], with the professional  component performed at [facility], [address] [place] [id number] ([phone])                 Phone call duration: 8 minutes

## 2021-06-04 NOTE — TELEPHONE ENCOUNTER
"Patient called with symptoms of missed menstrual period. Last period was in March. Patient not experiencing any other symptoms. Patient took home pregnancy test and believes it was positive. Patient states PCP advised for patient to be seen by OB/GYN dept. Patient transferred to scheduling.    Additional Information    Negative: Sounds like a life-threatening emergency to the triager    Negative: Abdominal pain is present    Negative: STD exposure and prevention, questions about    Negative: Patient sounds very sick or weak to the triager    Negative: Patient wants to be seen    Negative: Pregnant and ANY of the following: * Has an IUD * Prior history of 'ectopic pregnancy' * Previous tubal surgery (e.g., tubal ligation)* History of infertility    Wants a pregnancy test done in the office    Answer Assessment - Initial Assessment Questions  1. LMP:  \"When did your last menstrual period begin?\"      March 22  2. DAYS LATE: \"How many days late is your menstrual period?\"      2 months  3. REGULARITY: \"How regular are your periods?\"      Not regular  4. PREGNANCY: \"Is there any chance you are pregnant?\" (e.g., unprotected intercourse, missed birth control pill, broken condom) \"Have you used a home pregnancy test?\"      Yes, used home pregnancy test and believes it was positive  5. BREASTFEEDING: \"Are you breastfeeding?\"      no  6. BIRTH CONTROL PILLS: \"Are you taking birth control pills, or have you stopped recently?\"      no  7. DEPOPROVERA: \"Has your doctor given you a shot to prevent pregnancy?\" (e.g., Depoprovera injection)      no  8. CAUSE: \"What do you think caused the missed period?\" (e.g., stress, rapid weight loss, excessive exercise)      Possible pregnancy  9. OTHER SYMPTOMS: \"Do you have any other symptoms?\" (e.g., abdominal pain)      no    Protocols used: MENSTRUAL PERIOD - MISSED OR LATE-A-OH      "

## 2021-06-07 ENCOUNTER — VIRTUAL VISIT (OUTPATIENT)
Dept: FAMILY MEDICINE | Facility: OTHER | Age: 21
End: 2021-06-07
Attending: NURSE PRACTITIONER
Payer: COMMERCIAL

## 2021-06-07 DIAGNOSIS — F41.9 ANXIETY: Primary | ICD-10-CM

## 2021-06-07 PROCEDURE — 99213 OFFICE O/P EST LOW 20 MIN: CPT | Mod: 95 | Performed by: NURSE PRACTITIONER

## 2021-06-07 RX ORDER — ESCITALOPRAM OXALATE 5 MG/1
5 TABLET ORAL DAILY
Qty: 30 TABLET | Refills: 1 | Status: SHIPPED | OUTPATIENT
Start: 2021-06-07 | End: 2021-10-04

## 2021-06-07 ASSESSMENT — ANXIETY QUESTIONNAIRES
6. BECOMING EASILY ANNOYED OR IRRITABLE: NOT AT ALL
GAD7 TOTAL SCORE: 0
4. TROUBLE RELAXING: NOT AT ALL
7. FEELING AFRAID AS IF SOMETHING AWFUL MIGHT HAPPEN: NOT AT ALL
1. FEELING NERVOUS, ANXIOUS, OR ON EDGE: NOT AT ALL
5. BEING SO RESTLESS THAT IT IS HARD TO SIT STILL: NOT AT ALL
3. WORRYING TOO MUCH ABOUT DIFFERENT THINGS: NOT AT ALL
2. NOT BEING ABLE TO STOP OR CONTROL WORRYING: NOT AT ALL
IF YOU CHECKED OFF ANY PROBLEMS ON THIS QUESTIONNAIRE, HOW DIFFICULT HAVE THESE PROBLEMS MADE IT FOR YOU TO DO YOUR WORK, TAKE CARE OF THINGS AT HOME, OR GET ALONG WITH OTHER PEOPLE: NOT DIFFICULT AT ALL

## 2021-06-07 NOTE — NURSING NOTE
"No chief complaint on file.      Initial There were no vitals taken for this visit. Estimated body mass index is 22.98 kg/m  as calculated from the following:    Height as of 5/11/21: 1.499 m (4' 11\").    Weight as of 5/11/21: 51.6 kg (113 lb 12.8 oz).  Medication Reconciliation: complete  Mary Servin LPN    "

## 2021-06-08 ASSESSMENT — ANXIETY QUESTIONNAIRES: GAD7 TOTAL SCORE: 0

## 2021-06-08 ASSESSMENT — PAIN SCALES - GENERAL: PAINLEVEL: NO PAIN (0)

## 2021-06-08 ASSESSMENT — MIFFLIN-ST. JEOR: SCORE: 1187.73

## 2021-06-15 ENCOUNTER — OFFICE VISIT (OUTPATIENT)
Dept: OBGYN | Facility: OTHER | Age: 21
End: 2021-06-15
Attending: NURSE PRACTITIONER
Payer: COMMERCIAL

## 2021-06-15 VITALS
SYSTOLIC BLOOD PRESSURE: 120 MMHG | OXYGEN SATURATION: 98 % | HEIGHT: 59 IN | DIASTOLIC BLOOD PRESSURE: 70 MMHG | TEMPERATURE: 97.5 F | HEART RATE: 85 BPM | WEIGHT: 114 LBS | BODY MASS INDEX: 22.98 KG/M2

## 2021-06-15 DIAGNOSIS — N91.2 AMENORRHEA: Primary | ICD-10-CM

## 2021-06-15 LAB
HCG SERPL QL: NEGATIVE
T4 FREE SERPL-MCNC: 0.9 NG/DL (ref 0.76–1.46)
TSH SERPL DL<=0.005 MIU/L-ACNC: 1.61 MU/L (ref 0.4–4)

## 2021-06-15 PROCEDURE — 84703 CHORIONIC GONADOTROPIN ASSAY: CPT | Mod: ZL | Performed by: NURSE PRACTITIONER

## 2021-06-15 PROCEDURE — G0463 HOSPITAL OUTPT CLINIC VISIT: HCPCS

## 2021-06-15 PROCEDURE — 84443 ASSAY THYROID STIM HORMONE: CPT | Mod: ZL | Performed by: NURSE PRACTITIONER

## 2021-06-15 PROCEDURE — 84439 ASSAY OF FREE THYROXINE: CPT | Mod: ZL | Performed by: NURSE PRACTITIONER

## 2021-06-15 PROCEDURE — 36415 COLL VENOUS BLD VENIPUNCTURE: CPT | Mod: ZL | Performed by: NURSE PRACTITIONER

## 2021-06-15 PROCEDURE — 99214 OFFICE O/P EST MOD 30 MIN: CPT | Performed by: NURSE PRACTITIONER

## 2021-06-15 RX ORDER — NORGESTIMATE AND ETHINYL ESTRADIOL 0.25-0.035
1 KIT ORAL DAILY
Qty: 56 TABLET | Refills: 0 | Status: SHIPPED | OUTPATIENT
Start: 2021-06-15 | End: 2021-08-10

## 2021-06-15 NOTE — NURSING NOTE
"Chief Complaint   Patient presents with     Amenorrhea       Initial /70 (BP Location: Right arm, Patient Position: Chair, Cuff Size: Adult Regular)   Pulse 85   Temp 97.5  F (36.4  C) (Tympanic)   Ht 1.499 m (4' 11\")   Wt 51.7 kg (114 lb)   SpO2 98%   BMI 23.03 kg/m   Estimated body mass index is 23.03 kg/m  as calculated from the following:    Height as of this encounter: 1.499 m (4' 11\").    Weight as of this encounter: 51.7 kg (114 lb).  Medication Reconciliation: complete  LEXIE ZUNIGA LPN    "

## 2021-06-16 NOTE — PROGRESS NOTES
"[facility]                HPI   Trinh presents with concerns of several missed periods.  I previously saw her in 12/2019 for removal of her IUD as she was experiencing irregular bleeding and spotting.  She was then started on micronor which she stopped in August 2020.  She has been trying to conceive since that time and occurring every 2 to 8 weeks.   LMP was 3/22/21. She has had multiple negative home pregnancy tests.  Pap and pelvic exam are up to date and recent with her PCP.              Medications:     Current Outpatient Medications Ordered in Epic   Medication     norgestimate-ethinyl estradiol (ORTHO-CYCLEN) 0.25-35 MG-MCG tablet     escitalopram (LEXAPRO) 5 MG tablet     hydrOXYzine (ATARAX) 25 MG tablet     No current Epic-ordered facility-administered medications on file.                 Allergies:   Apple, Cherry, and Pistachios [nuts]         Review of Systems:   The 5 point Review of Systems is negative other than noted in the HPI                     Physical Exam:   Blood pressure 120/70, pulse 85, temperature 97.5  F (36.4  C), temperature source Tympanic, height 1.499 m (4' 11\"), weight 51.7 kg (114 lb), SpO2 98 %, not currently breastfeeding.  Constitutional:   awake, alert, cooperative, no apparent distress, and appears stated age              Data:   All laboratory data reviewed  Results for orders placed or performed in visit on 06/15/21   HCG qualitative, Blood ([id number])     Status: None   Result Value Ref Range    HCG Qualitative Serum Negative NEG^Negative   TSH     Status: None   Result Value Ref Range    TSH 1.61 0.40 - 4.00 mU/L   T4, free     Status: None   Result Value Ref Range    T4 Free 0.90 0.76 - 1.46 ng/dL               Assessment and Plan:   irregular periods and trying to conceive - prior labs reviewed and updated.  Will plan 2 cycles of bcp to try and reset her periods then can go back off and try to conceive.  If unsuccessful by later this fall she will call " for a referral if desired.  PNV encouraged.  Smoking cessation for partner.  Healthy diet and exercise encouraged.       30 minutes were spent with this patient in chart review and education.     Dagmar King NP, [facility]  6/16/2021  11:48 AM

## 2021-08-07 DIAGNOSIS — N91.2 AMENORRHEA: ICD-10-CM

## 2021-08-09 NOTE — TELEPHONE ENCOUNTER
Ortho-Cyclen      Last Written Prescription Date:  6/15/21  Last Fill Quantity: 56,   # refills: 0  Last Office Visit: 6/7/21  Future Office visit:       Routing refill request to provider for review/approval because:

## 2021-08-10 RX ORDER — NORGESTIMATE AND ETHINYL ESTRADIOL 0.25-0.035
KIT ORAL
Qty: 56 TABLET | Refills: 0 | Status: SHIPPED | OUTPATIENT
Start: 2021-08-10 | End: 2021-10-18

## 2021-08-16 RX ORDER — ALBUTEROL SULFATE 90 UG/1
2 AEROSOL, METERED RESPIRATORY (INHALATION) EVERY 4 HOURS PRN
Status: CANCELLED | OUTPATIENT
Start: 2021-08-16

## 2021-08-16 RX ORDER — ALBUTEROL SULFATE 90 UG/1
2 AEROSOL, METERED RESPIRATORY (INHALATION) EVERY 4 HOURS PRN
COMMUNITY

## 2021-08-17 DIAGNOSIS — R06.2 WHEEZING: Primary | ICD-10-CM

## 2021-08-17 RX ORDER — ALBUTEROL SULFATE 90 UG/1
2 AEROSOL, METERED RESPIRATORY (INHALATION) EVERY 6 HOURS
Qty: 8.5 G | Refills: 1 | Status: SHIPPED | OUTPATIENT
Start: 2021-08-17 | End: 2021-10-03

## 2021-09-02 ENCOUNTER — HOSPITAL ENCOUNTER (EMERGENCY)
Facility: HOSPITAL | Age: 21
Discharge: HOME | End: 2021-09-02
Attending: NURSE PRACTITIONER | Admitting: NURSE PRACTITIONER
Payer: COMMERCIAL

## 2021-09-02 ENCOUNTER — MYC MEDICAL ADVICE (OUTPATIENT)
Dept: FAMILY MEDICINE | Facility: OTHER | Age: 21
End: 2021-09-02

## 2021-09-02 VITALS
HEART RATE: 98 BPM | SYSTOLIC BLOOD PRESSURE: 117 MMHG | DIASTOLIC BLOOD PRESSURE: 79 MMHG | TEMPERATURE: 98.5 F | OXYGEN SATURATION: 99 % | RESPIRATION RATE: 16 BRPM

## 2021-09-02 DIAGNOSIS — N30.01 ACUTE CYSTITIS WITH HEMATURIA: Primary | ICD-10-CM

## 2021-09-02 LAB
ALBUMIN UR-MCNC: NEGATIVE MG/DL
APPEARANCE UR: ABNORMAL
BILIRUB UR QL STRIP: NEGATIVE
COLOR UR AUTO: YELLOW
GLUCOSE UR STRIP-MCNC: NEGATIVE MG/DL
HCG UR QL: NEGATIVE
HGB UR QL STRIP: ABNORMAL
KETONES UR STRIP-MCNC: NEGATIVE MG/DL
LEUKOCYTE ESTERASE UR QL STRIP: ABNORMAL
MUCOUS THREADS #/AREA URNS LPF: PRESENT /LPF
NITRATE UR QL: NEGATIVE
PH UR STRIP: 7 [PH] (ref 4.7–8)
RBC URINE: 94 /HPF
SP GR UR STRIP: 1.02 (ref 1–1.03)
SQUAMOUS EPITHELIAL: 2 /HPF
UROBILINOGEN UR STRIP-MCNC: NORMAL MG/DL
WBC URINE: 94 /HPF

## 2021-09-02 PROCEDURE — 81025 URINE PREGNANCY TEST: CPT | Performed by: NURSE PRACTITIONER

## 2021-09-02 PROCEDURE — 87186 SC STD MICRODIL/AGAR DIL: CPT | Performed by: NURSE PRACTITIONER

## 2021-09-02 PROCEDURE — G0463 HOSPITAL OUTPT CLINIC VISIT: HCPCS

## 2021-09-02 PROCEDURE — 87086 URINE CULTURE/COLONY COUNT: CPT | Performed by: NURSE PRACTITIONER

## 2021-09-02 PROCEDURE — 99213 OFFICE O/P EST LOW 20 MIN: CPT | Performed by: NURSE PRACTITIONER

## 2021-09-02 PROCEDURE — 81001 URINALYSIS AUTO W/SCOPE: CPT | Performed by: NURSE PRACTITIONER

## 2021-09-02 RX ORDER — CEPHALEXIN 500 MG/1
500 CAPSULE ORAL 2 TIMES DAILY
Qty: 14 CAPSULE | Refills: 0 | Status: SHIPPED | OUTPATIENT
Start: 2021-09-02 | End: 2021-09-09

## 2021-09-02 ASSESSMENT — ENCOUNTER SYMPTOMS
HEMATURIA: 0
FLANK PAIN: 0
FREQUENCY: 1
SHORTNESS OF BREATH: 0
PSYCHIATRIC NEGATIVE: 1
NAUSEA: 0
DIARRHEA: 0
ABDOMINAL PAIN: 0
CHILLS: 0
FEVER: 0
VOMITING: 0
DYSURIA: 1

## 2021-09-02 NOTE — ED TRIAGE NOTES
Patient presents with complaints of a UTI, states she gets them frequently, but has been taking AZO. On the box no more then 3 days and third day was yesterday.

## 2021-09-02 NOTE — DISCHARGE INSTRUCTIONS
Cephalexin as ordered  - Take entire course of antibiotic even if you start to feel better.  - Antibiotics can cause stomach upset including nausea and diarrhea. Read your bottle or ask the pharmacist if antibiotic can be taken with food to help prevent nausea. If you have symptoms of diarrhea you can take an over-the-counter probiotic and/or increase foods with probiotics such as yogurt, [place], sauerkraut.    Continue to push fluids    Follow-up with primary care provider or return to urgent care-ED with any worsening in condition or additional concerns.

## 2021-09-02 NOTE — ED PROVIDER NOTES
History     Chief Complaint   Patient presents with     Rule out Urinary Tract Infection     HPI  Trinh Gonzales is a 20 year old female who presents to urgent care today (ambulatory) with complaints of dysuria, frequency and pelvic pain (cramping with urination) which has been ongoing for the past 3 days.  Pain currently 1/10.  Pushing fluids and Azo.  Denies any abdominal or flank pain.  No history of kidney stones.  Denies fever, chills, nausea, vomiting, diarrhea, SOB or chest pain.  No concerns regarding STDs.  Frequent UTIs.  No other concerns.          Allergies:  Allergies   Allergen Reactions     Apple      Gums/mouth gets itchy     Cherry      Gums/mouth gets itchy     Pistachios [Nuts]        Problem List:    Patient Active Problem List    Diagnosis Date Noted     Anxiety 12/11/2019     Priority: Medium     Adjustment disorder with depressed mood 01/22/2018     Priority: Medium     PTSD (post-traumatic stress disorder) 01/22/2018     Priority: Medium     NO ACTIVE PROBLEMS 04/24/2017     Priority: Medium     Sexually active at young age 07/06/2016     Priority: Medium     Formatting of this note might be different from the original.  7/6/2016: refuses any form of birth control, states that 18 year old boyfriend has low sperm count       Suicidal behavior 05/08/2016     Priority: Medium     Adjustment disorder with mixed disturbance of emotions and conduct 05/08/2016     Priority: Medium     Astigmatism 11/30/2010     Priority: Medium     Formatting of this note might be different from the original.  IMO Update       Thelarche, premature 07/29/2008     Priority: Medium     Overview:   Asymmetric.  Slight nipple enlargement on left side.  Right side has breast tissue which is probably 3-4 cm.  IMO Update 10/11    Formatting of this note might be different from the original.  Asymmetric.  Slight nipple enlargement on left side.  Right side has breast tissue which is probably 3-4 cm.  IMO Update  10/11          Past Medical History:    No past medical history on file.    Past Surgical History:    No past surgical history on file.    Family History:    Family History   Problem Relation Age of Onset     Mental Illness Mother      Mental Illness Father        Social History:  Marital Status:   [2]  Social History     Tobacco Use     Smoking status: Never Smoker     Smokeless tobacco: Never Used   Substance Use Topics     Alcohol use: No     Drug use: No        Medications:    cephALEXin (KEFLEX) 500 MG capsule  albuterol (PROAIR HFA/PROVENTIL HFA/VENTOLIN HFA) 108 (90 Base) MCG/ACT inhaler  albuterol (PROAIR HFA/PROVENTIL HFA/VENTOLIN HFA) 108 (90 Base) MCG/ACT inhaler  escitalopram (LEXAPRO) 5 MG tablet  ESTARYLLA 0.25-35 MG-MCG tablet  hydrOXYzine (ATARAX) 25 MG tablet      Review of Systems   Constitutional: Negative for chills and fever.   Respiratory: Negative for shortness of breath.    Cardiovascular: Negative for chest pain.   Gastrointestinal: Negative for abdominal pain, diarrhea, nausea and vomiting.   Genitourinary: Positive for dysuria, frequency and pelvic pain (cramping with urination). Negative for decreased urine volume, flank pain, hematuria, vaginal bleeding, vaginal discharge and vaginal pain.   Musculoskeletal: Negative for gait problem.   Psychiatric/Behavioral: Negative.      Physical Exam   BP: 117/79  Pulse: 98  Temp: 98.5  F (36.9  C)  Resp: 16  SpO2: 99 %    Physical Exam  Vitals and nursing note reviewed.   Constitutional:       General: She is not in acute distress.     Appearance: She is not ill-appearing.   Cardiovascular:      Rate and Rhythm: Normal rate and regular rhythm.      Pulses: Normal pulses.      Heart sounds: Normal heart sounds.   Pulmonary:      Effort: Pulmonary effort is normal.      Breath sounds: Normal breath sounds.   Abdominal:      General: Bowel sounds are normal.      Palpations: Abdomen is soft.      Tenderness: There is no abdominal tenderness.  There is no right CVA tenderness or left CVA tenderness.   Neurological:      Mental Status: She is alert.   Psychiatric:         Mood and Affect: Mood normal.       ED Course     Results for orders placed or performed during the hospital encounter of 09/02/21 (from the past 24 hour(s))   UA with Microscopic reflex to Culture    Specimen: Urine, Midstream   Result Value Ref Range    Color Urine Yellow Colorless, Straw, Light Yellow, Yellow    Appearance Urine Slightly Cloudy (A) Clear    Glucose Urine Negative Negative mg/dL    Bilirubin Urine Negative Negative    Ketones Urine Negative Negative mg/dL    Specific Gravity Urine 1.016 1.003 - 1.035    Blood Urine Moderate (A) Negative    pH Urine 7.0 4.7 - 8.0    Protein Albumin Urine Negative Negative mg/dL    Urobilinogen Urine Normal Normal, 2.0 mg/dL    Nitrite Urine Negative Negative    Leukocyte Esterase Urine Large (A) Negative    Mucus Urine Present (A) None Seen /LPF    RBC Urine 94 (H) <=2 /HPF    WBC Urine 94 (H) <=5 /HPF    Squamous Epithelials Urine 2 (H) <=1 /HPF    Narrative    Urine Culture ordered based on laboratory criteria   HCG qualitative urine   Result Value Ref Range    hCG Urine Qualitative Negative Negative       Medications - No data to display    Assessments & Plan (with Medical Decision Making)     I have reviewed the nursing notes.    I have reviewed the findings, diagnosis, plan and need for follow up with the patient.  (N30.01) Acute cystitis with hematuria  (primary encounter diagnosis)  Plan:   Cephalexin as ordered  - Take entire course of antibiotic even if you start to feel better.  - Antibiotics can cause stomach upset including nausea and diarrhea. Read your bottle or ask the pharmacist if antibiotic can be taken with food to help prevent nausea. If you have symptoms of diarrhea you can take an over-the-counter probiotic and/or increase foods with probiotics such as yogurt, Richards, sauerkraut.    Continue to push  fluids    Follow-up with primary care provider or return to urgent care-ED with any worsening in condition or additional concerns.    New Prescriptions    CEPHALEXIN (KEFLEX) 500 MG CAPSULE    Take 1 capsule (500 mg) by mouth 2 times daily for 7 days     Final diagnoses:   Acute cystitis with hematuria     9/2/2021   [facility]     Kenyetta Menjivar NP  09/02/21 [id number]

## 2021-09-05 LAB — BACTERIA UR CULT: ABNORMAL

## 2021-09-07 DIAGNOSIS — R30.0 DYSURIA: Primary | ICD-10-CM

## 2021-09-07 RX ORDER — NITROFURANTOIN 25; 75 MG/1; MG/1
100 CAPSULE ORAL 2 TIMES DAILY
Qty: 14 CAPSULE | Refills: 0 | Status: SHIPPED | OUTPATIENT
Start: 2021-09-07 | End: 2021-09-14

## 2021-10-01 DIAGNOSIS — R06.2 WHEEZING: ICD-10-CM

## 2021-10-01 NOTE — TELEPHONE ENCOUNTER
albuterol  Last Written Prescription Date:  8/17/2021  Last Fill Quantity: 8.5 g ,   # refills: 1  Last Office Visit: 6/15/2021  Future Office visit:

## 2021-10-03 ENCOUNTER — HEALTH MAINTENANCE LETTER (OUTPATIENT)
Age: 21
End: 2021-10-03

## 2021-10-03 RX ORDER — ALBUTEROL SULFATE 90 UG/1
2 AEROSOL, METERED RESPIRATORY (INHALATION) EVERY 6 HOURS
Qty: 8.5 G | Refills: 1 | Status: SHIPPED | OUTPATIENT
Start: 2021-10-03 | End: 2021-11-19

## 2021-10-04 DIAGNOSIS — F41.9 ANXIETY: ICD-10-CM

## 2021-10-04 RX ORDER — ESCITALOPRAM OXALATE 5 MG/1
5 TABLET ORAL DAILY
Qty: 30 TABLET | Refills: 1 | Status: SHIPPED | OUTPATIENT
Start: 2021-10-04 | End: 2021-10-18

## 2021-10-18 ENCOUNTER — VIRTUAL VISIT (OUTPATIENT)
Dept: FAMILY MEDICINE | Facility: OTHER | Age: 21
End: 2021-10-18
Attending: NURSE PRACTITIONER
Payer: COMMERCIAL

## 2021-10-18 ENCOUNTER — TELEPHONE (OUTPATIENT)
Dept: FAMILY MEDICINE | Facility: OTHER | Age: 21
End: 2021-10-18

## 2021-10-18 DIAGNOSIS — F41.9 ANXIETY: ICD-10-CM

## 2021-10-18 DIAGNOSIS — N39.0 RECURRENT UTI: Primary | ICD-10-CM

## 2021-10-18 PROCEDURE — 99213 OFFICE O/P EST LOW 20 MIN: CPT | Mod: 95 | Performed by: NURSE PRACTITIONER

## 2021-10-18 RX ORDER — ESCITALOPRAM OXALATE 10 MG/1
10 TABLET ORAL DAILY
Qty: 30 TABLET | Refills: 3 | Status: SHIPPED | OUTPATIENT
Start: 2021-10-18 | End: 2021-11-15

## 2021-10-18 ASSESSMENT — ANXIETY QUESTIONNAIRES
5. BEING SO RESTLESS THAT IT IS HARD TO SIT STILL: NOT AT ALL
4. TROUBLE RELAXING: NOT AT ALL
7. FEELING AFRAID AS IF SOMETHING AWFUL MIGHT HAPPEN: SEVERAL DAYS
1. FEELING NERVOUS, ANXIOUS, OR ON EDGE: NEARLY EVERY DAY
6. BECOMING EASILY ANNOYED OR IRRITABLE: SEVERAL DAYS
2. NOT BEING ABLE TO STOP OR CONTROL WORRYING: NEARLY EVERY DAY
GAD7 TOTAL SCORE: 8
3. WORRYING TOO MUCH ABOUT DIFFERENT THINGS: NOT AT ALL
IF YOU CHECKED OFF ANY PROBLEMS ON THIS QUESTIONNAIRE, HOW DIFFICULT HAVE THESE PROBLEMS MADE IT FOR YOU TO DO YOUR WORK, TAKE CARE OF THINGS AT HOME, OR GET ALONG WITH OTHER PEOPLE: VERY DIFFICULT

## 2021-10-18 ASSESSMENT — PATIENT HEALTH QUESTIONNAIRE - PHQ9: SUM OF ALL RESPONSES TO PHQ QUESTIONS 1-9: 1

## 2021-10-18 NOTE — Clinical Note
1 month follow up for anxiety   Can be a phone visit     Brandi RODNEY FNP-BC  Family Nurse Practitioner

## 2021-10-18 NOTE — PROGRESS NOTES
Trinh is a 21 year old who is being evaluated via a billable telephone visit.      What phone number would you like to be contacted at? [phone]   How would you like to obtain your AVS? MyChart    Assessment & Plan     Anxiety  Slight improvement, but not to goal. She did well with 10 mg lexapro in the past.  Continue counseling and follow up in a month  - escitalopram (LEXAPRO) 10 MG tablet; Take 1 tablet (10 mg) by mouth daily    Recurrent UTI  She has had recurrent UTIs and symptoms continue even when antibiotic completed.  She would like a referral to urology   - Adult Urology Referral; Future    She will continue to follow up with OB/GYN.  She is trying to get pregnant and has not yet.         See Patient Instructions    Return in about 4 weeks (around 11/15/2021) for anxiety.    RONEL Garcia [facility] [place]    Subjective   Trinh is a 21 year old who presents for the following health issues     HPI     Depression and Anxiety Follow-Up    How are you doing with your depression since your last visit? Worsened     How are you doing with your anxiety since your last visit?  Worsened     Are you having other symptoms that might be associated with depression or anxiety? Yes:  panic attacks     Have you had a significant life event? No     Do you have any concerns with your use of alcohol or other drugs? No     lexapro 5 mg - 2 weeks -  She would like to go back to 10 mg daily    Continues to go to counseling with Mary DOZIER    Social History     Tobacco Use     Smoking status: Never Smoker     Smokeless tobacco: Never Used   Substance Use Topics     Alcohol use: No     Drug use: No     PHQ 4/13/2021 5/3/2021 10/18/2021   PHQ-9 Total Score 0 0 1   Q9: Thoughts of better off dead/self-harm past 2 weeks Not at all Not at all Not at all     GLORIA-7 SCORE 5/3/2021 6/7/2021 10/18/2021   Total Score - - -   Total Score 0 0 8     Last PHQ-9 10/18/2021   1.  Little interest or  pleasure in doing things 0   2.  Feeling down, depressed, or hopeless 1   3.  Trouble falling or staying asleep, or sleeping too much 0   4.  Feeling tired or having little energy 0   5.  Poor appetite or overeating 0   6.  Feeling bad about yourself 0   7.  Trouble concentrating 0   8.  Moving slowly or restless 0   Q9: Thoughts of better off dead/self-harm past 2 weeks 0   PHQ-9 Total Score 1   Difficulty at work, home, or with people Somewhat difficult     GLORIA-7  10/18/2021   1. Feeling nervous, anxious, or on edge 3   2. Not being able to stop or control worrying 3   3. Worrying too much about different things 0   4. Trouble relaxing 0   5. Being so restless that it is hard to sit still 0   6. Becoming easily annoyed or irritable 1   7. Feeling afraid, as if something awful might happen 1   GLORIA-7 Total Score 8   If you checked any problems, how difficult have they made it for you to do your work, take care of things at home, or get along with other people? Very difficult       Suicide Assessment Five-step Evaluation and Treatment (SAFE-T)      How many servings of fruits and vegetables do you eat daily?  2-3    On average, how many sweetened beverages do you drink each day (Examples: soda, juice, sweet tea, etc.  Do NOT count diet or artificially sweetened beverages)?   0    How many days per week do you exercise enough to make your heart beat faster? 7    How many minutes a day do you exercise enough to make your heart beat faster? 60 or more    How many days per week do you miss taking your medication? 0        Review of Systems   CONSTITUTIONAL: NEGATIVE for fever, chills, change in weight  INTEGUMENTARY/SKIN: NEGATIVE for worrisome rashes, moles or lesions  RESP: NEGATIVE for significant cough or SOB  CV: NEGATIVE for chest pain, palpitations or peripheral edema  NEURO: NEGATIVE for weakness, dizziness or paresthesias  PSYCHIATRIC: HX anxiety      Objective           Vitals:  No vitals were obtained today  due to virtual visit.    Physical Exam   alert and no distress  PSYCH: Alert and oriented times 3; coherent speech, normal   rate and volume, able to articulate logical thoughts, able   to abstract reason, no tangential thoughts, no hallucinations   or delusions  Her affect is normal and pleasant  RESP: No cough, no audible wheezing, able to talk in full sentences  Remainder of exam unable to be completed due to telephone visits    Admission on 09/02/2021, Discharged on 09/02/2021   Component Date Value Ref Range Status     Color Urine 09/02/2021 Yellow  Colorless, Straw, Light Yellow, Yellow Final     Appearance Urine 09/02/2021 Slightly Cloudy* Clear Final     Glucose Urine 09/02/2021 Negative  Negative mg/dL Final     Bilirubin Urine 09/02/2021 Negative  Negative Final     Ketones Urine 09/02/2021 Negative  Negative mg/dL Final     Specific Gravity Urine 09/02/2021 1.016  1.003 - 1.035 Final     Blood Urine 09/02/2021 Moderate* Negative Final     pH Urine 09/02/2021 7.0  4.7 - 8.0 Final     Protein Albumin Urine 09/02/2021 Negative  Negative mg/dL Final     Urobilinogen Urine 09/02/2021 Normal  Normal, 2.0 mg/dL Final     Nitrite Urine 09/02/2021 Negative  Negative Final     Leukocyte Esterase Urine 09/02/2021 Large* Negative Final     Mucus Urine 09/02/2021 Present* None Seen /LPF Final     RBC Urine 09/02/2021 94* <=2 /HPF Final     WBC Urine 09/02/2021 94* <=5 /HPF Final     Squamous Epithelials Urine 09/02/2021 2* <=1 /HPF Final     hCG Urine Qualitative 09/02/2021 Negative  Negative Final    This test is for screening purposes.  Results should be interpreted along with the clinical picture.  Confirmation testing is available if warranted by ordering [id number], HCG Quantitative Pregnancy.     Culture 09/02/2021 10,000-50,000 CFU/mL Escherichia coli*  Final               Phone call duration: 7 minutes

## 2021-10-19 ASSESSMENT — ANXIETY QUESTIONNAIRES: GAD7 TOTAL SCORE: 8

## 2021-11-07 ENCOUNTER — MYC MEDICAL ADVICE (OUTPATIENT)
Dept: OBGYN | Facility: OTHER | Age: 21
End: 2021-11-07
Payer: COMMERCIAL

## 2021-11-08 DIAGNOSIS — N97.9 FEMALE INFERTILITY: Primary | ICD-10-CM

## 2021-11-08 NOTE — TELEPHONE ENCOUNTER
Patient is wanting a referral to a fertility clinic. Would you be able to place this for her? Thank you.

## 2021-11-11 DIAGNOSIS — N39.0 RECURRENT UTI: Primary | ICD-10-CM

## 2021-11-15 ENCOUNTER — VIRTUAL VISIT (OUTPATIENT)
Dept: FAMILY MEDICINE | Facility: OTHER | Age: 21
End: 2021-11-15
Attending: NURSE PRACTITIONER
Payer: COMMERCIAL

## 2021-11-15 DIAGNOSIS — F41.9 ANXIETY: ICD-10-CM

## 2021-11-15 PROCEDURE — 99213 OFFICE O/P EST LOW 20 MIN: CPT | Mod: 95 | Performed by: NURSE PRACTITIONER

## 2021-11-15 PROCEDURE — G0463 HOSPITAL OUTPT CLINIC VISIT: HCPCS | Mod: 25,TEL,95

## 2021-11-15 RX ORDER — ESCITALOPRAM OXALATE 10 MG/1
10 TABLET ORAL DAILY
Qty: 90 TABLET | Refills: 1 | Status: SHIPPED | OUTPATIENT
Start: 2021-11-15 | End: 2022-06-01

## 2021-11-15 ASSESSMENT — ANXIETY QUESTIONNAIRES
IF YOU CHECKED OFF ANY PROBLEMS ON THIS QUESTIONNAIRE, HOW DIFFICULT HAVE THESE PROBLEMS MADE IT FOR YOU TO DO YOUR WORK, TAKE CARE OF THINGS AT HOME, OR GET ALONG WITH OTHER PEOPLE: SOMEWHAT DIFFICULT
3. WORRYING TOO MUCH ABOUT DIFFERENT THINGS: NOT AT ALL
5. BEING SO RESTLESS THAT IT IS HARD TO SIT STILL: NOT AT ALL
4. TROUBLE RELAXING: NOT AT ALL
2. NOT BEING ABLE TO STOP OR CONTROL WORRYING: NOT AT ALL
6. BECOMING EASILY ANNOYED OR IRRITABLE: NOT AT ALL
GAD7 TOTAL SCORE: 1
7. FEELING AFRAID AS IF SOMETHING AWFUL MIGHT HAPPEN: NOT AT ALL
1. FEELING NERVOUS, ANXIOUS, OR ON EDGE: SEVERAL DAYS

## 2021-11-15 ASSESSMENT — PAIN SCALES - GENERAL: PAINLEVEL: NO PAIN (0)

## 2021-11-15 NOTE — PROGRESS NOTES
"Chief Complaint   Patient presents with     Anxiety       Initial There were no vitals taken for this visit. Estimated body mass index is 23.03 kg/m  as calculated from the following:    Height as of 6/8/21: 1.499 m (4' 11\").    Weight as of 6/8/21: 51.7 kg (114 lb).  Medication Reconciliation: complete  Mary Servin LPN  "

## 2021-11-15 NOTE — PROGRESS NOTES
Trinh is a 21 year old who is being evaluated via a billable telephone visit.      What phone number would you like to be contacted at? [phone]   How would you like to obtain your AVS? MyChart    Assessment & Plan     Anxiety  Do great.  Continue current plan of care.    - escitalopram (LEXAPRO) 10 MG tablet; Take 1 tablet (10 mg) by mouth daily       See Patient Instructions    No follow-ups on file.    RONEL Garcia [facility] [place]    Subjective   Trinh is a 21 year old who presents for the following health issues     HPI     Anxiety Follow-Up    How are you doing with your anxiety since your last visit? Improved     Are you having other symptoms that might be associated with anxiety? No    Have you had a significant life event? No     Are you feeling depressed? No    Do you have any concerns with your use of alcohol or other drugs? No     Continues with counseling with Mary Cervantes on lexapro 10 mg daily     Social History     Tobacco Use     Smoking status: Never Smoker     Smokeless tobacco: Never Used   Substance Use Topics     Alcohol use: No     Drug use: No     GLORIA-7 SCORE 5/3/2021 6/7/2021 10/18/2021   Total Score - - -   Total Score 0 0 8     PHQ 4/13/2021 5/3/2021 10/18/2021   PHQ-9 Total Score 0 0 1   Q9: Thoughts of better off dead/self-harm past 2 weeks Not at all Not at all Not at all     GLORIA-7  11/15/2021   1. Feeling nervous, anxious, or on edge 1   2. Not being able to stop or control worrying 0   3. Worrying too much about different things 0   4. Trouble relaxing 0   5. Being so restless that it is hard to sit still 0   6. Becoming easily annoyed or irritable 0   7. Feeling afraid, as if something awful might happen 0   GLORIA-7 Total Score 1   If you checked any problems, how difficult have they made it for you to do your work, take care of things at home, or get along with other people? Somewhat difficult           Review of Systems    CONSTITUTIONAL: NEGATIVE for fever, chills, change in weight  INTEGUMENTARY/SKIN: NEGATIVE for worrisome rashes, moles or lesions  RESP: NEGATIVE for significant cough or SOB  CV: NEGATIVE for chest pain, palpitations or peripheral edema  PSYCHIATRIC: NEGATIVE for changes in mood or affect      Objective           Vitals:  No vitals were obtained today due to virtual visit.    Physical Exam   healthy, alert and no distress  PSYCH: Alert and oriented times 3; coherent speech, normal   rate and volume, able to articulate logical thoughts, able   to abstract reason, no tangential thoughts, no hallucinations   or delusions  Her affect is normal and pleasant  RESP: No cough, no audible wheezing, able to talk in full sentences  Remainder of exam unable to be completed due to telephone visits    Admission on 09/02/2021, Discharged on 09/02/2021   Component Date Value Ref Range Status     Color Urine 09/02/2021 Yellow  Colorless, Straw, Light Yellow, Yellow Final     Appearance Urine 09/02/2021 Slightly Cloudy* Clear Final     Glucose Urine 09/02/2021 Negative  Negative mg/dL Final     Bilirubin Urine 09/02/2021 Negative  Negative Final     Ketones Urine 09/02/2021 Negative  Negative mg/dL Final     Specific Gravity Urine 09/02/2021 1.016  1.003 - 1.035 Final     Blood Urine 09/02/2021 Moderate* Negative Final     pH Urine 09/02/2021 7.0  4.7 - 8.0 Final     Protein Albumin Urine 09/02/2021 Negative  Negative mg/dL Final     Urobilinogen Urine 09/02/2021 Normal  Normal, 2.0 mg/dL Final     Nitrite Urine 09/02/2021 Negative  Negative Final     Leukocyte Esterase Urine 09/02/2021 Large* Negative Final     Mucus Urine 09/02/2021 Present* None Seen /LPF Final     RBC Urine 09/02/2021 94* <=2 /HPF Final     WBC Urine 09/02/2021 94* <=5 /HPF Final     Squamous Epithelials Urine 09/02/2021 2* <=1 /HPF Final     hCG Urine Qualitative 09/02/2021 Negative  Negative Final    This test is for screening purposes.  Results should be  interpreted along with the clinical picture.  Confirmation testing is available if warranted by ordering [id number], HCG Quantitative Pregnancy.     Culture 09/02/2021 10,000-50,000 CFU/mL Escherichia coli*  Final               Phone call duration: 5 minutes

## 2021-11-16 ASSESSMENT — ANXIETY QUESTIONNAIRES: GAD7 TOTAL SCORE: 1

## 2021-11-19 DIAGNOSIS — R06.2 WHEEZING: ICD-10-CM

## 2021-11-19 RX ORDER — ALBUTEROL SULFATE 90 UG/1
AEROSOL, METERED RESPIRATORY (INHALATION)
Qty: 8.5 G | Refills: 5 | Status: SHIPPED | OUTPATIENT
Start: 2021-11-19 | End: 2022-05-23

## 2021-11-23 ENCOUNTER — LAB (OUTPATIENT)
Dept: LAB | Facility: OTHER | Age: 21
End: 2021-11-23
Attending: NURSE PRACTITIONER
Payer: COMMERCIAL

## 2021-11-23 ENCOUNTER — OFFICE VISIT (OUTPATIENT)
Dept: UROLOGY | Facility: OTHER | Age: 21
End: 2021-11-23
Attending: NURSE PRACTITIONER
Payer: COMMERCIAL

## 2021-11-23 VITALS
TEMPERATURE: 97.5 F | HEART RATE: 79 BPM | WEIGHT: 120 LBS | BODY MASS INDEX: 24.19 KG/M2 | HEIGHT: 59 IN | DIASTOLIC BLOOD PRESSURE: 60 MMHG | SYSTOLIC BLOOD PRESSURE: 130 MMHG | OXYGEN SATURATION: 98 %

## 2021-11-23 DIAGNOSIS — N39.0 RECURRENT UTI: ICD-10-CM

## 2021-11-23 LAB
ALBUMIN UR-MCNC: NEGATIVE MG/DL
APPEARANCE UR: CLEAR
BILIRUB UR QL STRIP: NEGATIVE
COLOR UR AUTO: YELLOW
GLUCOSE UR STRIP-MCNC: NEGATIVE MG/DL
HGB UR QL STRIP: NEGATIVE
KETONES UR STRIP-MCNC: NEGATIVE MG/DL
LEUKOCYTE ESTERASE UR QL STRIP: NEGATIVE
NITRATE UR QL: NEGATIVE
PH UR STRIP: 5.5 [PH] (ref 4.7–8)
SP GR UR STRIP: 1.02 (ref 1–1.03)
UROBILINOGEN UR STRIP-MCNC: NORMAL MG/DL

## 2021-11-23 PROCEDURE — 99203 OFFICE O/P NEW LOW 30 MIN: CPT | Performed by: UROLOGY

## 2021-11-23 PROCEDURE — G0463 HOSPITAL OUTPT CLINIC VISIT: HCPCS

## 2021-11-23 PROCEDURE — 81003 URINALYSIS AUTO W/O SCOPE: CPT | Mod: ZL

## 2021-11-23 ASSESSMENT — ENCOUNTER SYMPTOMS: SHORTNESS OF BREATH: 1

## 2021-11-23 ASSESSMENT — MIFFLIN-ST. JEOR: SCORE: 1209.95

## 2021-11-23 ASSESSMENT — PAIN SCALES - GENERAL: PAINLEVEL: NO PAIN (1)

## 2021-11-23 NOTE — LETTER
11/23/2021       RE: Trinh Gonzales  [address] [facility] [address]     Dear Colleague,    Thank you for referring your patient, Trinh Gonzales, to the [facility]. Please see a copy of my visit note below.      History     Chief Complaint:      UTI      HPI   Trinh Gonzales is a 21 year old female who presents with a history of urinary tract infections as well as urethral pain.  Christa tells me she started having urinary tract infections and the symptoms after she became sexually active around age 15.  She said she does not have pain during intercourse but the next day she will have discomfort in her urethra and it appears to be in the mid and distal urethra.  Occasionally when she gets a urinary tract infection she will have significant discomfort like lower abdominal cramping blood in her urine severe frequency and urgency.  She has not had any fever and she has not been hospitalized for these infections.  She will come in when she has these symptoms.  Her last infection was September and it was 10-50,000 colonies of E. coli.  She will occasionally take some Azo.  Normal voiding is every 3-4 hours nocturia 0-1.  No incontinence.  She does not drink any pop or caffeinated beverages just water.  No artificial sweeteners.  He has not had any stones but there is significant family history of stones    Allergies:    Allergies   Allergen Reactions     Apple      Gums/mouth gets itchy     Cherry      Gums/mouth gets itchy     Pistachios [Nuts]         Medications:      albuterol (PROAIR HFA/PROVENTIL HFA/VENTOLIN HFA) 108 (90 Base) MCG/ACT inhaler  escitalopram (LEXAPRO) 10 MG tablet  hydrOXYzine (ATARAX) 25 MG tablet  phenazopyridine (PYRIDIUM) 200 MG tablet  PROAIR  (90 Base) MCG/ACT inhaler        Problem List:      Patient Active Problem List    Diagnosis Date Noted     Anxiety 12/11/2019     Priority: Medium      Adjustment disorder with depressed mood 01/22/2018     Priority: Medium     PTSD (post-traumatic stress disorder) 01/22/2018     Priority: Medium     NO ACTIVE PROBLEMS 04/24/2017     Priority: Medium     Sexually active at young age 07/06/2016     Priority: Medium     Formatting of this note might be different from the original.  7/6/2016: refuses any form of birth control, states that 18 year old boyfriend has low sperm count       Suicidal behavior 05/08/2016     Priority: Medium     Adjustment disorder with mixed disturbance of emotions and conduct 05/08/2016     Priority: Medium     Astigmatism 11/30/2010     Priority: Medium     Formatting of this note might be different from the original.  IMO Update       Thelarche, premature 07/29/2008     Priority: Medium     Overview:   Asymmetric.  Slight nipple enlargement on left side.  Right side has breast tissue which is probably 3-4 cm.  IMO Update 10/11    Formatting of this note might be different from the original.  Asymmetric.  Slight nipple enlargement on left side.  Right side has breast tissue which is probably 3-4 cm.  IMO Update 10/11          Past Medical History:      History reviewed. No pertinent past medical history.    Past Surgical History:      History reviewed. No pertinent surgical history.    Family History:      Family History   Problem Relation Age of Onset     Mental Illness Mother      Mental Illness Father        Social History:    Marital Status:   [2]  Social History     Tobacco Use     Smoking status: Never Smoker     Smokeless tobacco: Never Used   Substance Use Topics     Alcohol use: No     Drug use: No        Review of Systems   Respiratory: Positive for shortness of breath.    Endocrine: Positive for heat intolerance.   All other systems reviewed and are negative.        Physical Exam   Vitals:  /60 (BP Location: Left arm, Patient Position: Chair, Cuff Size: Adult Regular)   Pulse 79   Temp 97.5  F (36.4  C)  "(Tympanic)   Ht 1.499 m (4' 11\")   Wt 54.4 kg (120 lb)   SpO2 98%   BMI 24.24 kg/m        Physical Exam  Constitutional:       Appearance: Normal appearance. She is normal weight.   Pulmonary:      Effort: Pulmonary effort is normal. No respiratory distress.      Breath sounds: No wheezing.   Abdominal:      General: Abdomen is flat. There is no distension.      Palpations: Abdomen is soft. There is no mass.      Tenderness: There is no abdominal tenderness.      Hernia: No hernia is present.   Genitourinary:     Comments: external genitalia are normal.  Normal-appearing urethral meatus.  On palpation of the urethra she is tender in the mid urethra toward the distal urethra.  I do not feel anything concerning for a urethral diverticuli.  She is not really tender on bimanual palpation of the bladder so this discomfort is all urethra.  No vaginal masses no pelvic masses external rectal area is normal.  Neurological:      Mental Status: She is alert.     Void residual: 0    Impression: #1 urinary tract infections likely postcoital #2 urethral pain      Plan   Plan: At this point Christa does not meet any further criteria to do cystoscopy.  There is no blood in her urine.  She gets an occasional infection maybe 1-2 a year and these appear to be associated with intercourse.  I do not think it is enough to do a postcoital antibiotic at this point.  She does have urethral discomfort that occurs almost always after intercourse and kind of some persistent burning.  I do not think this is severe enough that we want to trial her on anything such as Neurontin or amitriptyline.  She can manage this with warm tub soaks and an occasional Azo.  I am going to get a renal bladder ultrasound but will hold off on any other further evaluation at this point unless that is abnormal.  Reassurance was given.  I recommended she use some lubricant for intercourse.  Follow-up is as needed.      No follow-ups on file.    Marilyn Hartmann, " MD  [facility] [place]              Again, thank you for allowing me to participate in the care of your patient.      Sincerely,    Marilyn Hartmann MD

## 2021-11-23 NOTE — NURSING NOTE
"Chief Complaint   Patient presents with     UTI       Initial /60 (BP Location: Left arm, Patient Position: Chair, Cuff Size: Adult Regular)   Pulse 79   Temp 97.5  F (36.4  C) (Tympanic)   Ht 1.499 m (4' 11\")   Wt 54.4 kg (120 lb)   SpO2 98%   BMI 24.24 kg/m   Estimated body mass index is 24.24 kg/m  as calculated from the following:    Height as of this encounter: 1.499 m (4' 11\").    Weight as of this encounter: 54.4 kg (120 lb).  Medication Reconciliation: complete  LEXIE ZUNIGA LPN    "

## 2021-11-23 NOTE — PROGRESS NOTES
History     Chief Complaint:      UTI      HPI   Trinh Gonzales is a 21 year old female who presents with a history of urinary tract infections as well as urethral pain.  Christa tells me she started having urinary tract infections and the symptoms after she became sexually active around age 15.  She said she does not have pain during intercourse but the next day she will have discomfort in her urethra and it appears to be in the mid and distal urethra.  Occasionally when she gets a urinary tract infection she will have significant discomfort like lower abdominal cramping blood in her urine severe frequency and urgency.  She has not had any fever and she has not been hospitalized for these infections.  She will come in when she has these symptoms.  Her last infection was September and it was 10-50,000 colonies of E. coli.  She will occasionally take some Azo.  Normal voiding is every 3-4 hours nocturia 0-1.  No incontinence.  She does not drink any pop or caffeinated beverages just water.  No artificial sweeteners.  He has not had any stones but there is significant family history of stones    Allergies:    Allergies   Allergen Reactions     Apple      Gums/mouth gets itchy     Cherry      Gums/mouth gets itchy     Pistachios [Nuts]         Medications:      albuterol (PROAIR HFA/PROVENTIL HFA/VENTOLIN HFA) 108 (90 Base) MCG/ACT inhaler  escitalopram (LEXAPRO) 10 MG tablet  hydrOXYzine (ATARAX) 25 MG tablet  phenazopyridine (PYRIDIUM) 200 MG tablet  PROAIR  (90 Base) MCG/ACT inhaler        Problem List:      Patient Active Problem List    Diagnosis Date Noted     Anxiety 12/11/2019     Priority: Medium     Adjustment disorder with depressed mood 01/22/2018     Priority: Medium     PTSD (post-traumatic stress disorder) 01/22/2018     Priority: Medium     NO ACTIVE PROBLEMS 04/24/2017     Priority: Medium     Sexually active at young age 07/06/2016     Priority: Medium     Formatting of this note might be  "different from the original.  7/6/2016: refuses any form of birth control, states that 18 year old boyfriend has low sperm count       Suicidal behavior 05/08/2016     Priority: Medium     Adjustment disorder with mixed disturbance of emotions and conduct 05/08/2016     Priority: Medium     Astigmatism 11/30/2010     Priority: Medium     Formatting of this note might be different from the original.  IMO Update       Thelarcsang, premature 07/29/2008     Priority: Medium     Overview:   Asymmetric.  Slight nipple enlargement on left side.  Right side has breast tissue which is probably 3-4 cm.  IMO Update 10/11    Formatting of this note might be different from the original.  Asymmetric.  Slight nipple enlargement on left side.  Right side has breast tissue which is probably 3-4 cm.  IMO Update 10/11          Past Medical History:      History reviewed. No pertinent past medical history.    Past Surgical History:      History reviewed. No pertinent surgical history.    Family History:      Family History   Problem Relation Age of Onset     Mental Illness Mother      Mental Illness Father        Social History:    Marital Status:   [2]  Social History     Tobacco Use     Smoking status: Never Smoker     Smokeless tobacco: Never Used   Substance Use Topics     Alcohol use: No     Drug use: No        Review of Systems   Respiratory: Positive for shortness of breath.    Endocrine: Positive for heat intolerance.   All other systems reviewed and are negative.        Physical Exam   Vitals:  /60 (BP Location: Left arm, Patient Position: Chair, Cuff Size: Adult Regular)   Pulse 79   Temp 97.5  F (36.4  C) (Tympanic)   Ht 1.499 m (4' 11\")   Wt 54.4 kg (120 lb)   SpO2 98%   BMI 24.24 kg/m        Physical Exam  Constitutional:       Appearance: Normal appearance. She is normal weight.   Pulmonary:      Effort: Pulmonary effort is normal. No respiratory distress.      Breath sounds: No wheezing.   Abdominal:     "  General: Abdomen is flat. There is no distension.      Palpations: Abdomen is soft. There is no mass.      Tenderness: There is no abdominal tenderness.      Hernia: No hernia is present.   Genitourinary:     Comments: external genitalia are normal.  Normal-appearing urethral meatus.  On palpation of the urethra she is tender in the mid urethra toward the distal urethra.  I do not feel anything concerning for a urethral diverticuli.  She is not really tender on bimanual palpation of the bladder so this discomfort is all urethra.  No vaginal masses no pelvic masses external rectal area is normal.  Neurological:      Mental Status: She is alert.     Void residual: 0    Impression: #1 urinary tract infections likely postcoital #2 urethral pain      Plan   Plan: At this point Christa does not meet any further criteria to do cystoscopy.  There is no blood in her urine.  She gets an occasional infection maybe 1-2 a year and these appear to be associated with intercourse.  I do not think it is enough to do a postcoital antibiotic at this point.  She does have urethral discomfort that occurs almost always after intercourse and kind of some persistent burning.  I do not think this is severe enough that we want to trial her on anything such as Neurontin or amitriptyline.  She can manage this with warm tub soaks and an occasional Azo.  I am going to get a renal bladder ultrasound but will hold off on any other further evaluation at this point unless that is abnormal.  Reassurance was given.  I recommended she use some lubricant for intercourse.  Follow-up is as needed.      No follow-ups on file.    Marilyn Hartmann MD  [facility]

## 2021-11-29 ENCOUNTER — HOSPITAL ENCOUNTER (OUTPATIENT)
Dept: ULTRASOUND IMAGING | Facility: HOSPITAL | Age: 21
Discharge: HOME | End: 2021-11-29
Attending: UROLOGY | Admitting: UROLOGY
Payer: COMMERCIAL

## 2021-11-29 DIAGNOSIS — N39.0 RECURRENT UTI: ICD-10-CM

## 2021-11-29 PROCEDURE — 76770 US EXAM ABDO BACK WALL COMP: CPT

## 2022-05-18 ENCOUNTER — HOSPITAL ENCOUNTER (EMERGENCY)
Facility: HOSPITAL | Age: 22
Discharge: HOME | End: 2022-05-18
Attending: PHYSICIAN ASSISTANT | Admitting: PHYSICIAN ASSISTANT
Payer: COMMERCIAL

## 2022-05-18 ENCOUNTER — NURSE TRIAGE (OUTPATIENT)
Dept: FAMILY MEDICINE | Facility: OTHER | Age: 22
End: 2022-05-18
Payer: COMMERCIAL

## 2022-05-18 VITALS
HEART RATE: 83 BPM | OXYGEN SATURATION: 96 % | RESPIRATION RATE: 16 BRPM | SYSTOLIC BLOOD PRESSURE: 110 MMHG | DIASTOLIC BLOOD PRESSURE: 73 MMHG | TEMPERATURE: 98 F

## 2022-05-18 DIAGNOSIS — L03.032 PARONYCHIA OF TOE, LEFT: Primary | ICD-10-CM

## 2022-05-18 PROCEDURE — G0463 HOSPITAL OUTPT CLINIC VISIT: HCPCS

## 2022-05-18 PROCEDURE — 99213 OFFICE O/P EST LOW 20 MIN: CPT | Performed by: PHYSICIAN ASSISTANT

## 2022-05-18 RX ORDER — CEFADROXIL 1000 MG/1
500 TABLET ORAL EVERY 12 HOURS
Qty: 7 TABLET | Refills: 0 | Status: SHIPPED | OUTPATIENT
Start: 2022-05-18 | End: 2022-05-23

## 2022-05-18 NOTE — TELEPHONE ENCOUNTER
I cannot fit her in today.  Have her try to see if she can see a podiatrist.  I will be out for 2 weeks after today     Brandi RODNEY FNP-BC  Family Nurse Practitioner

## 2022-05-18 NOTE — ED PROVIDER NOTES
History     Chief Complaint   Patient presents with     Toe Pain     HPI  Trinh Gonzales is a 21 year old female who presents to urgent care for evaluation of ingrown toenail to left great toe.  Patient states that she has had this for 2 weeks and it will not resolve.  She states that she has been soaking her her toe and warm salty water but that it has become very painful to the point where she cannot wear shoe because of the pain.  Patient denies any mechanism of injury, numbness or weakness of great left toe.  She denies any fevers chills, or any other associated symptoms    Allergies:  Allergies   Allergen Reactions     Apple      Gums/mouth gets itchy     Cherry      Gums/mouth gets itchy     Pistachios [Nuts]        Problem List:    Patient Active Problem List    Diagnosis Date Noted     Anxiety 12/11/2019     Priority: Medium     Adjustment disorder with depressed mood 01/22/2018     Priority: Medium     PTSD (post-traumatic stress disorder) 01/22/2018     Priority: Medium     NO ACTIVE PROBLEMS 04/24/2017     Priority: Medium     Sexually active at young age 07/06/2016     Priority: Medium     Formatting of this note might be different from the original.  7/6/2016: refuses any form of birth control, states that 18 year old boyfriend has low sperm count       Suicidal behavior 05/08/2016     Priority: Medium     Adjustment disorder with mixed disturbance of emotions and conduct 05/08/2016     Priority: Medium     Astigmatism 11/30/2010     Priority: Medium     Formatting of this note might be different from the original.  IMO Update       Thelarcsang, premature 07/29/2008     Priority: Medium     Overview:   Asymmetric.  Slight nipple enlargement on left side.  Right side has breast tissue which is probably 3-4 cm.  IMO Update 10/11    Formatting of this note might be different from the original.  Asymmetric.  Slight nipple enlargement on left side.  Right side has breast tissue which is probably 3-4  leatha  O Update 10/11          Past Medical History:    No past medical history on file.    Past Surgical History:    No past surgical history on file.    Family History:    Family History   Problem Relation Age of Onset     Mental Illness Mother      Mental Illness Father        Social History:  Marital Status:   [2]  Social History     Tobacco Use     Smoking status: Never Smoker     Smokeless tobacco: Never Used   Substance Use Topics     Alcohol use: No     Drug use: No        Medications:    albuterol (PROAIR HFA/PROVENTIL HFA/VENTOLIN HFA) 108 (90 Base) MCG/ACT inhaler  Cefadroxil 1 g TABS  escitalopram (LEXAPRO) 10 MG tablet  hydrOXYzine (ATARAX) 25 MG tablet  phenazopyridine (PYRIDIUM) 200 MG tablet  PROAIR  (90 Base) MCG/ACT inhaler          Review of Systems   Musculoskeletal:        Ingrown toenail, left great toe   All other systems reviewed and are negative.      Physical Exam   BP: 110/73  Pulse: 83  Temp: 98  F (36.7  C)  Resp: 16  SpO2: 96 %      Physical Exam  Vitals and nursing note reviewed.   Constitutional:       General: She is not in acute distress.     Appearance: Normal appearance. She is not ill-appearing.   Cardiovascular:      Rate and Rhythm: Regular rhythm.      Heart sounds: Normal heart sounds.   Pulmonary:      Effort: Pulmonary effort is normal.      Breath sounds: Normal breath sounds.   Musculoskeletal:        Feet:    Feet:      Comments: Paronychia noted over medial aspect of left great toe.  No fluctuance is appreciated.  The medial aspect of this toenail has been cut down very short.  Patient is extremely tender with palpation.  Neurological:      Mental Status: She is oriented to person, place, and time.         ED Course                 Procedures             Critical Care time:               No results found for this or any previous visit (from the past 24 hour(s)).    Medications - No data to display    Assessments & Plan (with Medical Decision Making)   #1.   Paronychia of left great toe    Discussed exam findings with patient.  Patient is prescribed course of cefadroxil.  She is also referred to podiatry for follow-up and evaluation.  She is encouraged to continue performing warm soapy water soaks in the meantime.  Tylenol or ibuprofen as directed for pain.  Any additional concerns in the meantime please return to urgent care or follow-up with primary care provider.  Patient verbalized understanding and agreement of plan.  I have reviewed the nursing notes.    I have reviewed the findings, diagnosis, plan and need for follow up with the patient.    New Prescriptions    CEFADROXIL 1 G TABS    Take 500 mg by mouth every 12 hours       Final diagnoses:   Paronychia of toe, left       5/18/2022   [facility]     Earl Worthy PA-C  05/18/22 [id number]

## 2022-05-18 NOTE — ED TRIAGE NOTES
"Pt presents with ingrown toenail that has been there for three weeks. Pt states there was a \"light green\" drainage last week. Pt states pain is so bad she cant even put a sock on it.       "

## 2022-05-18 NOTE — TELEPHONE ENCOUNTER
"Pt calling and wants to be seen for ingrown toenail left big toe. Both sides ingrown,but right side is worse.Entire toe red.Pus did come out last week. Not today.She has been soaking in hydrogen peroxide. Painful to walk on and painful with light touch. No red streaking, no fever. She would like to be seen.    OVERBOOK today?    Call back [phone]    Cherie Benitez RN      Reason for Disposition    Entire toe is red    Additional Information    Negative: Doesn't match the SYMPTOMS for ingrown toenail    Negative: Patient sounds very sick or weak to the triager    Negative: [1] Looks infected (e.g., spreading redness, red streak, pus) AND [2] fever    Negative: [1] Red streaking AND [2] longer than 1 inch (2.5 cm)    Negative: [1] Skin around the nail has become red AND [2] larger than 2 inches (5 cm)    Negative: Entire toe is swollen    Answer Assessment - Initial Assessment Questions  1. LOCATION: \"Which toe?\"       Left big toe  2. APPEARANCE: \"What does it look like?\"       On both sides of toenail,right side is hard and red,no puss anymore she has been soaking with hydrogen peroxide, she is able to walk,tip of toe is hard  3. ONSET: \"When did it start?\"       Three weekss ago  4. PAIN: \"Is there any pain?\" If so, ask: \"How bad is the pain?\"   (Scale 1-10; or mild, moderate, severe)      8/10  5. REDNESS: \"Is there any redness of the skin?\" If so, ask: \"How much of the toe is red?\"      Tip of toe and around the right side of toenail  6. OTHER SYMPTOMS: \"Do you have any other symptoms?\" (e.g., fever, shaking, chills, red streak up foot)      no  7. PREGNANCY: \"Is there any chance you are pregnant?\" \"When was your last menstrual period?\"      No she is on period    Protocols used: TOENAIL - INGROWN-A-      "

## 2022-05-18 NOTE — TELEPHONE ENCOUNTER
Pt called and updated on below.Offered to send message to [facility].Too painful per pt and will need to go to UC. Encouraged to call back if needed.    Cherie Benitez RN

## 2022-05-19 ENCOUNTER — OFFICE VISIT (OUTPATIENT)
Dept: PODIATRY | Facility: OTHER | Age: 22
End: 2022-05-19
Attending: PHYSICIAN ASSISTANT
Payer: COMMERCIAL

## 2022-05-19 VITALS
TEMPERATURE: 97.2 F | RESPIRATION RATE: 16 BRPM | WEIGHT: 129 LBS | SYSTOLIC BLOOD PRESSURE: 111 MMHG | HEART RATE: 78 BPM | OXYGEN SATURATION: 97 % | DIASTOLIC BLOOD PRESSURE: 72 MMHG | BODY MASS INDEX: 26.05 KG/M2

## 2022-05-19 DIAGNOSIS — L60.0 INGROWN TOENAIL: Primary | ICD-10-CM

## 2022-05-19 DIAGNOSIS — M79.675 GREAT TOE PAIN, LEFT: ICD-10-CM

## 2022-05-19 PROCEDURE — G0463 HOSPITAL OUTPT CLINIC VISIT: HCPCS | Mod: 25

## 2022-05-19 PROCEDURE — 11750 EXCISION NAIL&NAIL MATRIX: CPT | Performed by: PODIATRIST

## 2022-05-19 ASSESSMENT — PAIN SCALES - GENERAL: PAINLEVEL: SEVERE PAIN (6)

## 2022-05-19 NOTE — PROGRESS NOTES
Chief complaint: Patient presents with:  Ingrown Toenail: Infected      History of Present Illness: This 21 year old female is seen at the request of NAINA Nielsen, for evaluation and suggestions of management of a painful ingrown toenail on the LEFT hallux bilateral toenail border (medial more than lateral). It flared up three weeks ago. She never had issues with it prior to this and she thinks it is from the new shoes that she wore.    She was doing epsom salt soaks with antibiotic ointment.    No further pedal complaints today.       /72 (BP Location: Left arm, Patient Position: Sitting, Cuff Size: Adult Regular)   Pulse 78   Temp 97.2  F (36.2  C) (Tympanic)   Resp 16   Wt 58.5 kg (129 lb)   SpO2 97%   BMI 26.05 kg/m      Patient Active Problem List   Diagnosis     NO ACTIVE PROBLEMS     Adjustment disorder with depressed mood     PTSD (post-traumatic stress disorder)     Suicidal behavior     [place], premature     Adjustment disorder with mixed disturbance of emotions and conduct     Anxiety     Astigmatism     Sexually active at young age       History reviewed. No pertinent surgical history.    Current Outpatient Medications   Medication     albuterol (PROAIR HFA/PROVENTIL HFA/VENTOLIN HFA) 108 (90 Base) MCG/ACT inhaler     Cefadroxil 1 g TABS     escitalopram (LEXAPRO) 10 MG tablet     hydrOXYzine (ATARAX) 25 MG tablet     phenazopyridine (PYRIDIUM) 200 MG tablet     PROAIR  (90 Base) MCG/ACT inhaler     No current facility-administered medications for this visit.          Allergies   Allergen Reactions     Apple      Gums/mouth gets itchy     Cherry      Gums/mouth gets itchy     Pistachios [Nuts]        Family History   Problem Relation Age of Onset     Mental Illness Mother      Mental Illness Father        Social History     Socioeconomic History     Marital status:      Spouse name: None     Number of children: None     Years of education: None     Highest education  level: None   Tobacco Use     Smoking status: Never Smoker     Smokeless tobacco: Never Used   Substance and Sexual Activity     Alcohol use: No     Drug use: No     Sexual activity: Yes     Partners: Male     Birth control/protection: Pill   Other Topics Concern     Parent/sibling w/ CABG, MI or angioplasty before 65F 55M? No       ROS: 10 point ROS neg other than the symptoms noted above in the HPI.  EXAM  Constitutional: healthy, alert and no distress    Psychiatric: mentation appears normal and affect normal/bright    VASCULAR:  -Dorsalis pedis pulse +2/4 b/l  -Posterior tibial pulse +2/4 b/l  -Capillary refill time < 3 seconds to b/l hallux  NEURO:  -Light touch sensation intact to b/l plantar forefoot  DERM:  -Skin temperature, texture and turgor WNL b/l  -Incurvation to the bilateral border of the LEFT hallux  ---Mild erythema and edema to the nail border  ---No drainage  ---No severe erythema, no ascending erythema, no calor, no purulence, no malodor, no other SOI.    -Toenails elongated, thickened, dystrophic and discolored x 10  MSK:  -Pain on palpation to bilateral border of the LEFT hallux (medial more than lateral)  -Muscle strength of ankles +5/5 for dorsiflexion, plantarflexion, ABDUction and ADDuction b/l    ============================================================    ASSESSMENT:  (L60.0) Ingrown toenail  (primary encounter diagnosis)    (M79.675) Great toe pain, left      PLAN:  -Patient evaluated and examined. Treatment options discussed with no educational barriers noted.  --Discussed nail procedure options and etiologies and treatments for ingrown toenails. Conservatively, patient could opt for a slant back today and keep monitoring the toe since there are no SOI. Discussed risks and benefits and healing course of a nail border avulsion vs. Matrixectomy including post procedure infection or a non-healing wound, both of which could lead to a life threatening infection or amputation of the foot  or leg or a proximal amputation. Patient understands the risks and benefits and has decided to proceed with a LEFT hallux bilateral toenail border matrixectomy. Upon removing the two borders, the patient said she did not want the thin toenail and she'd prefer having the entire toenail removed. The consent was changed and initialed by the patient, provider, and the nurse.    -Matrixectomy of left hallux toenail: Written and verbal consent obtained after reviewing risks and benefits of the procedure. Patient understands that although phenol is used in attempt to prevent regrowth of the ingrown toenail, the nail can still grow back. There is also a risk of post procedure infection. A severe foot infection could lead to a proximal foot or leg amputation or loss of life, so the patient is advised to return to podiatry or the ED immediately if the patient notices any SOI. The patient is in agreement with this plan and wishes to proceed with the procedure. A time-out was performed to identify the correct patient, limb, digit and procedure.    An alcohol prep pad was applied to  to the base of the left hallux. The digit was injected with 7 mL of 1:1 of 2% Lidocaine plain and 0.5% marcaine plain in a ring block fashion at the base of the toe. Adequate local anesthesia was obtained. A ring tournicot was applied to the digit and a chloroprep was applied to the hallux. A freer was used to loosen the nail from the underlying nail bed. An English Anvil and a hemostat were then used to remove the nail border. Upon removing the toenail borders, the patient said she didn't want to leave the remaining, thin toenail and she wanted the rest of the toenail removed. The consent was changed and she initialed the consent as well as the provider and the nurse. The remainder of the toenail was removed. A total of three applications of phenol were applied for 30 seconds per application for the toe. The digit was rinsed thoroughly with  "alcohol. The tournicot was removed from the toe and there was a prompt hyperemic response to the hallux. The wound was then dressed with an Silvadene, gauze and 1\" coban. The patient was educated on after procedure care including daily epsom salt soaks starting tomorrow followed by dressing of the toe with an antibiotic cream and a bandaid until the wound site on the toe stops draining (2-3 weeks). Provided education on how to look for signs of infection (redness, swelling, pain, purulence, fever, chills, nausea, vomiting) and the patient was instructed to return to the clinic or Emergency Department immediately if there are any signs of infection.    -Patient in agreement with the above treatment plan and all of patient's questions were answered.      RTC as needed        Maddison Montenegro DPM, DPM  "

## 2022-05-19 NOTE — LETTER
5/19/2022         RE: Trinh Gonzales  [address] [place] [id number]        Dear Colleague,    Thank you for referring your patient, Trinh Gonzales, to the [facility]. Please see a copy of my visit note below.    Chief complaint: Patient presents with:  Ingrown Toenail: Infected      History of Present Illness: This 21 year old female is seen at the request of NAINA Nielsen, for evaluation and suggestions of management of a painful ingrown toenail on the LEFT hallux bilateral toenail border (medial more than lateral). It flared up three weeks ago. She never had issues with it prior to this and she thinks it is from the new shoes that she wore.    She was doing epsom salt soaks with antibiotic ointment.    No further pedal complaints today.       /72 (BP Location: Left arm, Patient Position: Sitting, Cuff Size: Adult Regular)   Pulse 78   Temp 97.2  F (36.2  C) (Tympanic)   Resp 16   Wt 58.5 kg (129 lb)   SpO2 97%   BMI 26.05 kg/m      Patient Active Problem List   Diagnosis     NO ACTIVE PROBLEMS     Adjustment disorder with depressed mood     PTSD (post-traumatic stress disorder)     Suicidal behavior     Thelarche, premature     Adjustment disorder with mixed disturbance of emotions and conduct     Anxiety     Astigmatism     Sexually active at young age       History reviewed. No pertinent surgical history.    Current Outpatient Medications   Medication     albuterol (PROAIR HFA/PROVENTIL HFA/VENTOLIN HFA) 108 (90 Base) MCG/ACT inhaler     Cefadroxil 1 g TABS     escitalopram (LEXAPRO) 10 MG tablet     hydrOXYzine (ATARAX) 25 MG tablet     phenazopyridine (PYRIDIUM) 200 MG tablet     PROAIR  (90 Base) MCG/ACT inhaler     No current facility-administered medications for this visit.          Allergies   Allergen Reactions     Apple      Gums/mouth gets itchy     Cherry      Gums/mouth gets itchy     Pistachios [Nuts]        Family History   Problem Relation Age  of Onset     Mental Illness Mother      Mental Illness Father        Social History     Socioeconomic History     Marital status:      Spouse name: None     Number of children: None     Years of education: None     Highest education level: None   Tobacco Use     Smoking status: Never Smoker     Smokeless tobacco: Never Used   Substance and Sexual Activity     Alcohol use: No     Drug use: No     Sexual activity: Yes     Partners: Male     Birth control/protection: Pill   Other Topics Concern     Parent/sibling w/ CABG, MI or angioplasty before 65F 55M? No       ROS: 10 point ROS neg other than the symptoms noted above in the HPI.  EXAM  Constitutional: healthy, alert and no distress    Psychiatric: mentation appears normal and affect normal/bright    VASCULAR:  -Dorsalis pedis pulse +2/4 b/l  -Posterior tibial pulse +2/4 b/l  -Capillary refill time < 3 seconds to b/l hallux  NEURO:  -Light touch sensation intact to b/l plantar forefoot  DERM:  -Skin temperature, texture and turgor WNL b/l  -Incurvation to the bilateral border of the LEFT hallux  ---Mild erythema and edema to the nail border  ---No drainage  ---No severe erythema, no ascending erythema, no calor, no purulence, no malodor, no other SOI.    -Toenails elongated, thickened, dystrophic and discolored x 10  MSK:  -Pain on palpation to bilateral border of the LEFT hallux (medial more than lateral)  -Muscle strength of ankles +5/5 for dorsiflexion, plantarflexion, ABDUction and ADDuction b/l    ============================================================    ASSESSMENT:  (L60.0) Ingrown toenail  (primary encounter diagnosis)    (M79.675) Great toe pain, left      PLAN:  -Patient evaluated and examined. Treatment options discussed with no educational barriers noted.  --Discussed nail procedure options and etiologies and treatments for ingrown toenails. Conservatively, patient could opt for a slant back today and keep monitoring the toe since there are  no SOI. Discussed risks and benefits and healing course of a nail border avulsion vs. Matrixectomy including post procedure infection or a non-healing wound, both of which could lead to a life threatening infection or amputation of the foot or leg or a proximal amputation. Patient understands the risks and benefits and has decided to proceed with a LEFT hallux bilateral toenail border matrixectomy. Upon removing the two borders, the patient said she did not want the thin toenail and she'd prefer having the entire toenail removed. The consent was changed and initialed by the patient, provider, and the nurse.    -Matrixectomy of left hallux toenail: Written and verbal consent obtained after reviewing risks and benefits of the procedure. Patient understands that although phenol is used in attempt to prevent regrowth of the ingrown toenail, the nail can still grow back. There is also a risk of post procedure infection. A severe foot infection could lead to a proximal foot or leg amputation or loss of life, so the patient is advised to return to podiatry or the ED immediately if the patient notices any SOI. The patient is in agreement with this plan and wishes to proceed with the procedure. A time-out was performed to identify the correct patient, limb, digit and procedure.    An alcohol prep pad was applied to  to the base of the left hallux. The digit was injected with 7 mL of 1:1 of 2% Lidocaine plain and 0.5% marcaine plain in a ring block fashion at the base of the toe. Adequate local anesthesia was obtained. A ring tournicot was applied to the digit and a chloroprep was applied to the hallux. A freer was used to loosen the nail from the underlying nail bed. An English Anvil and a hemostat were then used to remove the nail border. Upon removing the toenail borders, the patient said she didn't want to leave the remaining, thin toenail and she wanted the rest of the toenail removed. The consent was changed and she  "initialed the consent as well as the provider and the nurse. The remainder of the toenail was removed. A total of three applications of phenol were applied for 30 seconds per application for the toe. The digit was rinsed thoroughly with alcohol. The tournicot was removed from the toe and there was a prompt hyperemic response to the hallux. The wound was then dressed with an Silvadene, gauze and 1\" coban. The patient was educated on after procedure care including daily epsom salt soaks starting tomorrow followed by dressing of the toe with an antibiotic cream and a bandaid until the wound site on the toe stops draining (2-3 weeks). Provided education on how to look for signs of infection (redness, swelling, pain, purulence, fever, chills, nausea, vomiting) and the patient was instructed to return to the clinic or Emergency Department immediately if there are any signs of infection.    -Patient in agreement with the above treatment plan and all of patient's questions were answered.      RTC as needed        Maddison Montenegro DPM, ANGELA      Again, thank you for allowing me to participate in the care of your patient.        Sincerely,        Maddison Montenegro DPM  "

## 2022-05-19 NOTE — NURSING NOTE
"Chief Complaint   Patient presents with     Ingrown Toenail     Infected       Initial /72 (BP Location: Left arm, Patient Position: Sitting, Cuff Size: Adult Regular)   Pulse 78   Temp 97.2  F (36.2  C) (Tympanic)   Resp 16   Wt 58.5 kg (129 lb)   SpO2 97%   BMI 26.05 kg/m   Estimated body mass index is 26.05 kg/m  as calculated from the following:    Height as of 11/23/21: 1.499 m (4' 11\").    Weight as of this encounter: 58.5 kg (129 lb).  Medication Reconciliation: complete  Marley Mera LPN  "

## 2022-05-19 NOTE — PATIENT INSTRUCTIONS
Nail procedure care:  -Start epsom salt soaks tomorrow. Soak the foot 1-2 times a day for 20 minutes.  -Apply an antibiotic cream, gauze and a bandaid over the toe for the first week after the procedure.  -After one week, switch to a betadine dressing. Apply a small amount of betadine on gauze or dab the betadine over the toe with gauze and apply another dry gauze over the toe followed by a band aid or tape.  -Do not apply a band aid directly over the nail procedure site without gauze.     Keep the toe covered at all times until it is completely healed.  -You may develop a black scab over the nail bed--let this fall off on its own and don't pick at it.  -The toe may drain for 2-3 weeks. It is normal for it to have a clear drainage.    Watching for signs of infection:  If the toe has a thick, white pus coming from the procedure site or if the the toe becomes red, swollen, painful, or you begin to feel sick (fever/chills/nausea/vomitting), return to the podiatry clinic immediately or to the emergency room if after hours.    Thank you for allowing  and our Podiatry team to participate in your care. Please call our office at [phone] with scheduling questions or with any other questions or concerns.

## 2022-05-23 ENCOUNTER — OFFICE VISIT (OUTPATIENT)
Dept: OBGYN | Facility: OTHER | Age: 22
End: 2022-05-23
Attending: OBSTETRICS & GYNECOLOGY
Payer: COMMERCIAL

## 2022-05-23 VITALS
HEART RATE: 82 BPM | OXYGEN SATURATION: 98 % | WEIGHT: 129 LBS | SYSTOLIC BLOOD PRESSURE: 102 MMHG | HEIGHT: 59 IN | DIASTOLIC BLOOD PRESSURE: 64 MMHG | BODY MASS INDEX: 26 KG/M2 | RESPIRATION RATE: 16 BRPM

## 2022-05-23 DIAGNOSIS — N92.6 IRREGULAR MENSES: ICD-10-CM

## 2022-05-23 DIAGNOSIS — N91.3 PRIMARY OLIGOMENORRHEA: Primary | ICD-10-CM

## 2022-05-23 PROCEDURE — G0463 HOSPITAL OUTPT CLINIC VISIT: HCPCS | Performed by: COUNSELOR

## 2022-05-23 PROCEDURE — 99214 OFFICE O/P EST MOD 30 MIN: CPT | Performed by: OBSTETRICS & GYNECOLOGY

## 2022-05-23 PROCEDURE — G0463 HOSPITAL OUTPT CLINIC VISIT: HCPCS | Mod: 25 | Performed by: COUNSELOR

## 2022-05-23 ASSESSMENT — PAIN SCALES - GENERAL: PAINLEVEL: NO PAIN (0)

## 2022-05-23 NOTE — NURSING NOTE
"Chief Complaint   Patient presents with     Consult     Irregular Menses       Initial /64 (BP Location: Left arm, Patient Position: Sitting, Cuff Size: Adult Regular)   Pulse 82   Ht 1.499 m (4' 11\")   Wt 58.5 kg (129 lb)   LMP 05/14/2022   SpO2 98%   BMI 26.05 kg/m   Estimated body mass index is 26.05 kg/m  as calculated from the following:    Height as of this encounter: 1.499 m (4' 11\").    Weight as of this encounter: 58.5 kg (129 lb).  Medication Reconciliation: complete     Ligia Chow LPN    "

## 2022-05-23 NOTE — PROGRESS NOTES
GYN CONSULT NOTE     CHIEF COMPLAINT / REASON FOR VISIT  Patient presents for Gynecology consultation at the request of her primary provider, Dr. Brandi Nolan, for irregular menses.    HISTORY OF PRESENT ILLNESS  Trinh Gonzales is a 21 year old  with Patient's last menstrual period was 2022 (exact date). who presents for Gynecology consultation for irregular menses.  Patient has a long history of irregular menses.  She was treated with birth control pills in the past which resulted in a regular monthly menses.  She also had a ParaGard IUD for contraception in  which resulted in heavy menses and breakthrough bleeding which caused her to stop the ParaGard IUD. The patient was then treated with Micronor which resulted in monthly menses and she stopped in 2020 so that she could try to conceive. Since 2020 she reports menses every 2 to 8 weeks the last anywhere from 5 to 7 days. The patient is not using contraception. The patient is concerned that she may have polycystic ovarian syndrome. She denies acne but does report hirsutism with excessive hair growth on her chin and upper lip which she causes her to shave 3-4 times per week.  No excessive hair growth on chest, stomach, or upper back. She denies male pattern hair loss, deep voice or virilization symptoms. She denies cervical dysplasia. She is sexually active but denies dyspareunia or postcoital spotting. She denies history of STD. She denies tobacco or drug use.    MENSTRUAL HISTORY  Menarche was at age 11.  Menses: irregular every 2-8 weeks.  Menses last: 5-7 days.  Heavy menses: Denies.  Intermenstrual bleeding: Denies.  Dysmenorrhea: Controlled with OTC.  She is sexually active and denies issues with intercourse.   Dyspareunia: Denies.  Postcoital spotting: Denies.  Current contraception: none.  STD History: No STD history.  Last Pap smear history: Normal.  Mammogram history: N/A.    ALLERGIES     Allergies   Allergen  Reactions     Apple      Gums/mouth gets itchy     Cherry      Gums/mouth gets itchy     Pistachios [Nuts]        MEDICATIONS    Current Outpatient Medications:      escitalopram (LEXAPRO) 10 MG tablet, Take 1 tablet (10 mg) by mouth daily, Disp: 90 tablet, Rfl: 1     hydrOXYzine (ATARAX) 25 MG tablet, TAKE 1 TO 2 TABLETS(25 TO 50 MG) BY MOUTH EVERY 6 HOURS AS NEEDED FOR ANXIETY, Disp: 60 tablet, Rfl: 0     albuterol (PROAIR HFA/PROVENTIL HFA/VENTOLIN HFA) 108 (90 Base) MCG/ACT inhaler, Inhale 2 puffs into the lungs every 4 hours as needed (Patient not taking: Reported on 2022), Disp: , Rfl:      phenazopyridine (PYRIDIUM) 200 MG tablet, Take 1 tablet (200 mg) by mouth 3 times daily as needed for irritation (Patient not taking: Reported on 2022), Disp: 6 tablet, Rfl: 0    REVIEW OF SYSTEMS  As per the HPI, otherwise negative.    Past Medical History:   Diagnosis Date     Adjustment disorder with depressed mood 2018     Adjustment disorder with mixed disturbance of emotions and conduct 2016     Anxiety 2019     Astigmatism 2010    Formatting of this note might be different from the original. IMO Update     Irregular menses 2022     PTSD (post-traumatic stress disorder) 2018     Suicidal behavior 2016     Thelarche, premature 2008    Overview:  Asymmetric.  Slight nipple enlargement on left side.  Right side has breast tissue which is probably 3-4 cm. IMO Update 10/11  Formatting of this note might be different from the original. Asymmetric.  Slight nipple enlargement on left side.  Right side has breast tissue which is probably 3-4 cm. IMO Update 10/11     Past Surgical History:   Procedure Laterality Date     WISDOM TOOTH EXTRACTION Bilateral      OB History    Para Term  AB Living   0 0 0 0 0 0   SAB IAB Ectopic Multiple Live Births   0 0 0 0 0     Social History     Tobacco Use     Smoking status: Never Smoker     Smokeless tobacco: Never Used  "  Substance Use Topics     Alcohol use: No     History   Sexual Activity     Sexual activity: Yes     Partners: Male     Birth control/ protection: None     Family History   Problem Relation Age of Onset     Mental Illness Mother      Mental Illness Father      OBJECTIVE  /64 (BP Location: Left arm, Patient Position: Sitting, Cuff Size: Adult Regular)   Pulse 82   Resp 16   Ht 1.499 m (4' 11\")   Wt 58.5 kg (129 lb)   LMP 2022 (Exact Date)   SpO2 98%   BMI 26.05 kg/m      General:  Well-developed, well-nourished female in no apparent distress.  Neurological: Alert and oriented x3.  Skin: No hirsutism or excessive hair growth noted.  Lungs:  Clear to auscultation bilaterally with good inspiratory effort.  No wheezing rhonchi or rales noted. Breathing nonlabored.  Heart:  Regular rate and rhythm without murmur. No JVD.  No peripheral vascular disease.  Abdomen: Soft, nontender, nondistended, positive bowel sounds.  No organomegaly. No rebound, no guarding.  Pelvic exam: Deferred.  Extremities:  No clubbing cyanosis or edema. Nontender bilaterally.    DIAGNOSTICS  2021 Pap NILM  06/15/2021 TSH 1.61    ASSESSMENT / PLAN  Trinh Gonzales is a 21 year old  female who presents in consultation for irregular menstrual.    1 Irregular menstruation/oligomenorrhea    - I discussed irregular menstruation with the patient.  - The patient has a long history of irregular menses now every 2-8 weeks.  She has been treated in the past with birth control pills with regular monthly menses on OCP.  - The patient desires to conceive a pregnancy.  - I discussed polycystic ovary syndrome (PCOS) with the patient. It is characterized clinically by oligomenorrhea and hyperandrogenism.  - Approximately 40 to 85 percent of women with PCOS are overweight or obese. Insulin resistance is present in up to 70 percent of women with PCOS.  - Women with PCOS are also at increased risk for type 2 diabetes, dyslipidemia, " obstructive sleep apnea, and sleep apnea.  - The diagnosis of PCOS is suspected in any women of reproductive age who presents with irregular menses and symptoms of hyperandrogenism (acne, hirsutism, male-pattern hair loss). The presence of obesity further raises suspicion.  - Women with polycystic ovaries on ultrasound and no other clinical features of PCOS (hyperandrogenism or menstrual dysfunction) do not have PCOS and need no further evaluation.  - The diagnosis of PCOS is made using the [place]ter[place] criteria. Two out of three of the following are required to make the diagnosis: oligomenorrhea, hyperandrogenism, and polycystic ovaries on ultrasound.  - Hyperandrogenism may be diagnosed by either clinical (acne, hirsutism, or male-pattern hair loss) or biochemical (elevated serum androgen concentration, usually total testosterone) criteria. Signs of more severe androgen excess, virilization, such as deepening of the voice and clitoromegaly, occur rarely and suggest the possibility of ovarian hyperthecosis or an androgen-secreting tumor.  - Hirsutism is defined as excess terminal (thick, pigmented) body hair in a male distribution and may be noted above the upper lip, chin, periareolar area, in the midsternum, and along the linea alba of the lower abdomen. There is substantial racial variability in hirsutism.  - I recommend checking total testosterone, prolactin, HCG, TSH, FSH, 17 hydroxyprogesterone, and AMH. This will check for androgen-secreting tumor, hyperprolactinemia, pregnancy, thyroid disease, and ovarian insufficiency.  - I also recommend checking a transvaginal pelvic ultrasound to look for the presence of polycystic ovaries. The typical polycystic appearance of the ovaries is seen in the majority of women with irregular menses and hyperandrogenism. However, this ultrasound appearance is nonspecific as it may also be seen in normal-cycling women. The Rotter[place] criteria include the presence of 20 or more  follicles in either ovary measuring 2 to 9 mm in diameter and/or increased ovarian volume (>10 mL).  - Radiology will contact the patient to schedule pelvic ultrasound.  - The patient will contact me with the first day of her next menses so we can order labs.  - The patient will contact me if she does not get her menses at 30 days so that we can check a pregnancy test.  - I will discuss specific management options with the patient the pending on the results of her workup.  - The patient asked appropriate questions and these were answered for her.    - Problem list reviewed and updated.  - Follow up in 2-3 weeks for ultrasound and labs as above.    Tai Hoang MD  Obstetrics and Gynecology      CC: PRIMARY CARE PROVIDER: Brandi Sewell.

## 2022-05-25 ENCOUNTER — HOSPITAL ENCOUNTER (OUTPATIENT)
Dept: ULTRASOUND IMAGING | Facility: HOSPITAL | Age: 22
Discharge: HOME | End: 2022-05-25
Attending: OBSTETRICS & GYNECOLOGY | Admitting: OBSTETRICS & GYNECOLOGY
Payer: COMMERCIAL

## 2022-05-25 DIAGNOSIS — N92.6 IRREGULAR MENSES: ICD-10-CM

## 2022-05-25 PROCEDURE — 76830 TRANSVAGINAL US NON-OB: CPT

## 2022-05-30 DIAGNOSIS — F41.9 ANXIETY: ICD-10-CM

## 2022-06-01 RX ORDER — ESCITALOPRAM OXALATE 10 MG/1
TABLET ORAL
Qty: 90 TABLET | Refills: 1 | Status: SHIPPED | OUTPATIENT
Start: 2022-06-01 | End: 2022-12-09

## 2022-06-01 NOTE — TELEPHONE ENCOUNTER
Escitalopram      Last Written Prescription Date:  11/15/21  Last Fill Quantity: 90,   # refills: 1  Last Office Visit: 5/23/22  Future Office visit:

## 2022-06-15 DIAGNOSIS — N92.6 IRREGULAR MENSES: Primary | ICD-10-CM

## 2022-06-15 DIAGNOSIS — N97.9 FEMALE INFERTILITY: ICD-10-CM

## 2022-06-21 ENCOUNTER — LAB (OUTPATIENT)
Dept: LAB | Facility: OTHER | Age: 22
End: 2022-06-21
Payer: COMMERCIAL

## 2022-06-21 ENCOUNTER — MYC MEDICAL ADVICE (OUTPATIENT)
Dept: OBGYN | Facility: OTHER | Age: 22
End: 2022-06-21

## 2022-06-21 ENCOUNTER — MYC MEDICAL ADVICE (OUTPATIENT)
Dept: OBGYN | Facility: OTHER | Age: 22
End: 2022-06-21
Payer: COMMERCIAL

## 2022-06-21 DIAGNOSIS — N91.2 AMENORRHEA: ICD-10-CM

## 2022-06-21 DIAGNOSIS — N91.2 AMENORRHEA: Primary | ICD-10-CM

## 2022-06-21 LAB — B-HCG SERPL-ACNC: <1 IU/L (ref 0–5)

## 2022-06-21 PROCEDURE — 36415 COLL VENOUS BLD VENIPUNCTURE: CPT | Mod: ZL

## 2022-06-21 PROCEDURE — 84702 CHORIONIC GONADOTROPIN TEST: CPT | Mod: ZL

## 2022-06-29 ENCOUNTER — MYC MEDICAL ADVICE (OUTPATIENT)
Dept: OBGYN | Facility: OTHER | Age: 22
End: 2022-06-29

## 2022-06-29 DIAGNOSIS — N91.3 PRIMARY OLIGOMENORRHEA: Primary | ICD-10-CM

## 2022-06-29 RX ORDER — MEDROXYPROGESTERONE ACETATE 10 MG
10 TABLET ORAL DAILY
Qty: 30 TABLET | Refills: 0 | Status: SHIPPED | OUTPATIENT
Start: 2022-06-29 | End: 2022-12-22

## 2022-06-30 ENCOUNTER — MYC MEDICAL ADVICE (OUTPATIENT)
Dept: OBGYN | Facility: OTHER | Age: 22
End: 2022-06-30

## 2022-07-01 ENCOUNTER — LAB (OUTPATIENT)
Dept: LAB | Facility: OTHER | Age: 22
End: 2022-07-01
Payer: COMMERCIAL

## 2022-07-01 DIAGNOSIS — N92.6 IRREGULAR MENSES: ICD-10-CM

## 2022-07-01 DIAGNOSIS — N91.3 PRIMARY OLIGOMENORRHEA: ICD-10-CM

## 2022-07-01 DIAGNOSIS — N97.9 FEMALE INFERTILITY: ICD-10-CM

## 2022-07-01 LAB — TSH SERPL DL<=0.005 MIU/L-ACNC: 0.44 MU/L (ref 0.4–4)

## 2022-07-01 PROCEDURE — 84146 ASSAY OF PROLACTIN: CPT | Mod: ZL

## 2022-07-01 PROCEDURE — 83520 IMMUNOASSAY QUANT NOS NONAB: CPT | Mod: ZL

## 2022-07-01 PROCEDURE — 84403 ASSAY OF TOTAL TESTOSTERONE: CPT | Mod: ZL

## 2022-07-01 PROCEDURE — 84443 ASSAY THYROID STIM HORMONE: CPT | Mod: ZL

## 2022-07-01 PROCEDURE — 84144 ASSAY OF PROGESTERONE: CPT | Mod: ZL

## 2022-07-01 PROCEDURE — 36415 COLL VENOUS BLD VENIPUNCTURE: CPT | Mod: ZL

## 2022-07-01 PROCEDURE — 83001 ASSAY OF GONADOTROPIN (FSH): CPT | Mod: ZL

## 2022-07-02 LAB
FSH SERPL IRP2-ACNC: 8 MIU/ML
MIS SERPL-MCNC: 2.49 NG/ML (ref 1.2–12)
PROGEST SERPL-MCNC: 0.2 NG/ML

## 2022-07-05 ENCOUNTER — MYC MEDICAL ADVICE (OUTPATIENT)
Dept: OBGYN | Facility: OTHER | Age: 22
End: 2022-07-05

## 2022-07-05 DIAGNOSIS — N97.9 FEMALE INFERTILITY: ICD-10-CM

## 2022-07-05 DIAGNOSIS — N91.3 PRIMARY OLIGOMENORRHEA: Primary | ICD-10-CM

## 2022-07-06 LAB
PROLACTIN SERPL 3RD IS-MCNC: 16 NG/ML (ref 5–23)
TESTOST SERPL-MCNC: 10 NG/DL (ref 8–60)

## 2022-07-09 ENCOUNTER — HEALTH MAINTENANCE LETTER (OUTPATIENT)
Age: 22
End: 2022-07-09

## 2022-07-18 ENCOUNTER — OFFICE VISIT (OUTPATIENT)
Dept: OBGYN | Facility: OTHER | Age: 22
End: 2022-07-18
Attending: OBSTETRICS & GYNECOLOGY
Payer: COMMERCIAL

## 2022-07-18 VITALS
HEIGHT: 59 IN | HEART RATE: 75 BPM | RESPIRATION RATE: 16 BRPM | BODY MASS INDEX: 25.44 KG/M2 | SYSTOLIC BLOOD PRESSURE: 101 MMHG | OXYGEN SATURATION: 98 % | WEIGHT: 126.2 LBS | DIASTOLIC BLOOD PRESSURE: 62 MMHG

## 2022-07-18 DIAGNOSIS — E28.2 PCOS (POLYCYSTIC OVARIAN SYNDROME): ICD-10-CM

## 2022-07-18 PROCEDURE — 99214 OFFICE O/P EST MOD 30 MIN: CPT | Performed by: OBSTETRICS & GYNECOLOGY

## 2022-07-18 PROCEDURE — G0463 HOSPITAL OUTPT CLINIC VISIT: HCPCS | Performed by: COUNSELOR

## 2022-07-18 ASSESSMENT — PAIN SCALES - GENERAL: PAINLEVEL: NO PAIN (0)

## 2022-07-18 NOTE — NURSING NOTE
"Chief Complaint   Patient presents with     Follow Up     Labs and USG       Initial /62 (BP Location: Left arm, Patient Position: Sitting, Cuff Size: Adult Regular)   Pulse 75   Ht 1.499 m (4' 11\")   Wt 57.2 kg (126 lb 3.2 oz)   SpO2 98%   BMI 25.49 kg/m   Estimated body mass index is 25.49 kg/m  as calculated from the following:    Height as of this encounter: 1.499 m (4' 11\").    Weight as of this encounter: 57.2 kg (126 lb 3.2 oz).  Medication Reconciliation: complete     Ligia Chow, YAQUELIN    "

## 2022-07-18 NOTE — PROGRESS NOTES
CHIEF COMPLAINT / REASON FOR VISIT  Patient presents for Gynecology visit for polycystic ovarian syndrome.    HISTORY OF PRESENT ILLNESS  Trinh Gonzales is a 21 year old  with Patient's last menstrual period was 2022 (exact date). who presents for polycystic ovarian syndrome.  The patient has a long history of irregular menses.  She been treated in the past with OCP which resulted in a regular monthly menses.  She stopped Mircornor OCP so that she could attempt pregnancy.  Off of OCP she has menses every 3 to 8 weeks at last 5 to 7 days.  She denies obesity or excessive weight.  No significant acne, male pattern hair loss, deep voice, or virilization symptoms.  She does have hirsutism with excessive hair growth on her chin, upper lip, and stomach for which she shaves several times per week.  She is sexually active but denies dyspareunia or postcoital spotting.  No history of STD, PID, or cervical dysplasia.  No tobacco or drug use.    MENSTRUAL HISTORY  Menarche was at age 11.  Menses: irregular every 3-8 weeks.  Menses last: 5-7 days.  Heavy menses: Denies.  Intermenstrual bleeding: Denies.  Dysmenorrhea: Controlled with OTC.  She is sexually active and denies issues with intercourse.   Dyspareunia: Denies.  Postcoital spotting: Denies.  Current contraception: none.  STD History: No STD history.  Last Pap smear history: Normal.  Mammogram history: N/A.    ALLERGIES     Allergies   Allergen Reactions     Apple      Gums/mouth gets itchy     Cherry      Gums/mouth gets itchy     Pistachios [Nuts]        MEDICATIONS    Current Outpatient Medications:      escitalopram (LEXAPRO) 10 MG tablet, TAKE 1 TABLET(10 MG) BY MOUTH DAILY, Disp: 90 tablet, Rfl: 1     hydrOXYzine (ATARAX) 25 MG tablet, TAKE 1 TO 2 TABLETS(25 TO 50 MG) BY MOUTH EVERY 6 HOURS AS NEEDED FOR ANXIETY, Disp: 60 tablet, Rfl: 0     metFORMIN (GLUCOPHAGE) 500 MG tablet, Take 1 tablet (500 mg) by mouth 2 times daily (with meals), Disp: 60  "tablet, Rfl: 3     albuterol (PROAIR HFA/PROVENTIL HFA/VENTOLIN HFA) 108 (90 Base) MCG/ACT inhaler, Inhale 2 puffs into the lungs every 4 hours as needed (Patient not taking: Reported on 2022), Disp: , Rfl:      medroxyPROGESTERone (PROVERA) 10 MG tablet, Take 1 tablet (10 mg) by mouth daily (Patient not taking: Reported on 2022), Disp: 30 tablet, Rfl: 0     phenazopyridine (PYRIDIUM) 200 MG tablet, Take 1 tablet (200 mg) by mouth 3 times daily as needed for irritation (Patient not taking: No sig reported), Disp: 6 tablet, Rfl: 0    REVIEW OF SYSTEMS  As per HPI, otherwise negative    The patient's Medical Hx, Surgical Hx, Obstetrical Hx, Social Hx, and Family Hx have been reviewed and updated in the electronic medical record.    OBJECTIVE  /62 (BP Location: Left arm, Patient Position: Sitting, Cuff Size: Adult Regular)   Pulse 75   Resp 16   Ht 1.499 m (4' 11\")   Wt 57.2 kg (126 lb 3.2 oz)   LMP 2022 (Exact Date)   SpO2 98%   BMI 25.49 kg/m      General:  Well-developed, well-nourished female in no apparent distress.  Neurological: Alert and oriented x3.  Lungs:  Clear to auscultation bilaterally with good inspiratory effort.  No wheezing rhonchi or rales noted. Breathing nonlabored.  Heart:  Regular rate and rhythm without murmur. No JVD.  No peripheral vascular disease.  Abdomen: Soft, nontender, nondistended, positive bowel sounds.  No organomegaly. No rebound, no guarding.  Pelvic exam: Deferred.  Extremities:  No clubbing cyanosis or edema. Nontender bilaterally.    DIAGNOSTICS  2021 Pap NILM  06/15/2021 TSH 1.61  2022 HCG negative  2022 Total testosterone 10, Progesterone 0.2, AMH 2.49, FSH 8.0, TSH 0.44, Prolactin 16    2022 Pelvic ultrasound: normal uterus and endometrium, and bilateral ovaries have multiple follicles. No free fluid.    ASSESSMENT / PLAN  Trinh Gonzales is a 21 year old  female who presents for polycystic ovarian " syndrome.    1 Polycystic ovarian syndrome    - I discussed polycystic ovarian syndrome with the patient. PCOS is characterized clinically by oligomenorrhea and hyperandrogenism.  The patient has irregular menses and symptoms of hyperandrogenism with hirsutism.  She also has polycystic ovaries on pelvic ultrasound.  Based on [place] criteria, the patient has polycystic ovarian syndrome.  - I reviewed her labs which show normal AMH which is reassuring as she desires pregnancy.  - I discussed the metabolic etiology and long-term health consequences of polycystic brain syndrome including the increased risk of progression to diabetes.  I discussed the effects of polycystic ovarian syndrome on menses regulation as well as fertility.  - I recommend the patient utilize regular exercise to improve her likelihood of ovulation and conception.  - I discussed other management options with the patient including oral contraceptive pills to help regulate menses.  The patient declines contraception as she desires to attempt pregnancy.  - I discussed medication therapy and recommend Metformin.  I discussed expected outcome, risk, benefits, alternatives, and indication of Metformin with the patient.  I reviewed with the patient that the majority of women with polycystic ovarian syndrome will resume regular menses on Metformin.  I gave the patient a prescription for Metformin 500 mg p.o. twice daily with instructions to use.  I discussed side effects which will resolve in a lot of patients.  The patient will contact me if her side effects become too bothersome.  - I discussed ovulation predictor kits and recommend the patient practice timed intercourse.  - I would like to see the patient back in clinic if she does not get pregnant after 6 months of active attempts on Metformin.    - Problem list reviewed and updated.  - Follow up in 4-6 months.    - 45 minute visit with greater than 50% spent in counseling.    Tai Hoang  MD  Obstetrics and Gynecology

## 2022-09-04 ENCOUNTER — HEALTH MAINTENANCE LETTER (OUTPATIENT)
Age: 22
End: 2022-09-04

## 2022-11-03 ENCOUNTER — LAB (OUTPATIENT)
Dept: LAB | Facility: OTHER | Age: 22
End: 2022-11-03
Payer: COMMERCIAL

## 2022-11-03 DIAGNOSIS — N91.2 AMENORRHEA: Primary | ICD-10-CM

## 2022-11-03 DIAGNOSIS — N91.2 AMENORRHEA: ICD-10-CM

## 2022-11-03 DIAGNOSIS — Z34.01 PREGNANCY, SUPERVISION OF FIRST, FIRST TRIMESTER: ICD-10-CM

## 2022-11-03 LAB — HCG INTACT+B SERPL-ACNC: 12 MIU/ML

## 2022-11-03 PROCEDURE — 84702 CHORIONIC GONADOTROPIN TEST: CPT | Performed by: OBSTETRICS & GYNECOLOGY

## 2022-11-03 PROCEDURE — 36415 COLL VENOUS BLD VENIPUNCTURE: CPT | Performed by: OBSTETRICS & GYNECOLOGY

## 2022-11-03 PROCEDURE — 84702 CHORIONIC GONADOTROPIN TEST: CPT | Mod: ZL

## 2022-11-03 PROCEDURE — 36415 COLL VENOUS BLD VENIPUNCTURE: CPT | Mod: ZL

## 2022-11-07 ENCOUNTER — LAB (OUTPATIENT)
Dept: LAB | Facility: OTHER | Age: 22
End: 2022-11-07
Payer: COMMERCIAL

## 2022-11-07 DIAGNOSIS — N91.2 AMENORRHEA: ICD-10-CM

## 2022-11-07 LAB — HCG INTACT+B SERPL-ACNC: 76 MIU/ML

## 2022-11-07 PROCEDURE — 36415 COLL VENOUS BLD VENIPUNCTURE: CPT | Mod: ZL

## 2022-11-07 PROCEDURE — 84702 CHORIONIC GONADOTROPIN TEST: CPT | Performed by: OBSTETRICS & GYNECOLOGY

## 2022-11-07 PROCEDURE — 36415 COLL VENOUS BLD VENIPUNCTURE: CPT | Performed by: OBSTETRICS & GYNECOLOGY

## 2022-11-07 PROCEDURE — 84702 CHORIONIC GONADOTROPIN TEST: CPT | Mod: ZL

## 2022-11-11 ENCOUNTER — LAB (OUTPATIENT)
Dept: LAB | Facility: OTHER | Age: 22
End: 2022-11-11
Payer: COMMERCIAL

## 2022-11-11 DIAGNOSIS — N91.2 AMENORRHEA: ICD-10-CM

## 2022-11-11 LAB — HCG INTACT+B SERPL-ACNC: 612 MIU/ML

## 2022-11-11 PROCEDURE — 84702 CHORIONIC GONADOTROPIN TEST: CPT | Mod: ZL

## 2022-11-11 PROCEDURE — 84702 CHORIONIC GONADOTROPIN TEST: CPT | Performed by: OBSTETRICS & GYNECOLOGY

## 2022-11-11 PROCEDURE — 36415 COLL VENOUS BLD VENIPUNCTURE: CPT | Mod: ZL

## 2022-11-11 PROCEDURE — 36415 COLL VENOUS BLD VENIPUNCTURE: CPT | Performed by: OBSTETRICS & GYNECOLOGY

## 2022-11-14 DIAGNOSIS — E28.2 PCOS (POLYCYSTIC OVARIAN SYNDROME): ICD-10-CM

## 2022-11-15 NOTE — TELEPHONE ENCOUNTER
METFORMIN 500MG TABLETS  Last Written Prescription Date:  7/18/2022  Last Fill Quantity: 60,   # refills: 3  Last Office Visit: 7/18/2022  Future Office visit:       Routing refill request to provider for review/approval because:

## 2022-11-21 ENCOUNTER — HOSPITAL ENCOUNTER (OUTPATIENT)
Dept: ULTRASOUND IMAGING | Facility: HOSPITAL | Age: 22
Discharge: HOME | End: 2022-11-21
Attending: OBSTETRICS & GYNECOLOGY | Admitting: OBSTETRICS & GYNECOLOGY
Payer: COMMERCIAL

## 2022-11-21 ENCOUNTER — MYC MEDICAL ADVICE (OUTPATIENT)
Dept: OBGYN | Facility: OTHER | Age: 22
End: 2022-11-21

## 2022-11-21 DIAGNOSIS — Z34.01 PREGNANCY, SUPERVISION OF FIRST, FIRST TRIMESTER: ICD-10-CM

## 2022-11-21 DIAGNOSIS — Z34.01 PREGNANCY, SUPERVISION OF FIRST, FIRST TRIMESTER: Primary | ICD-10-CM

## 2022-11-21 PROCEDURE — 76801 OB US < 14 WKS SINGLE FETUS: CPT

## 2022-11-28 DIAGNOSIS — O21.9 NAUSEA AND VOMITING DURING PREGNANCY: Primary | ICD-10-CM

## 2022-11-28 RX ORDER — ONDANSETRON 4 MG/1
4 TABLET, ORALLY DISINTEGRATING ORAL EVERY 8 HOURS PRN
Qty: 50 TABLET | Refills: 3 | Status: ON HOLD | OUTPATIENT
Start: 2022-11-28 | End: 2023-07-10

## 2022-12-08 DIAGNOSIS — F41.9 ANXIETY: ICD-10-CM

## 2022-12-09 RX ORDER — ESCITALOPRAM OXALATE 10 MG/1
TABLET ORAL
Qty: 90 TABLET | Refills: 0 | Status: SHIPPED | OUTPATIENT
Start: 2022-12-09 | End: 2023-03-10

## 2022-12-09 NOTE — TELEPHONE ENCOUNTER
Lexapro      Last Written Prescription Date:  9.4.22  Last Fill Quantity: #90,   # refills: 0  Last Office Visit: 11.15.21  Future Office visit:    Next 5 appointments (look out 90 days)    Dec 14, 2022  1:30 PM  (Arrive by 1:15 PM)  ESTABLISHED PRENATAL with Tai Hoang MD  [facility] ([facility] ) [address]  [place] [address]  [phone]           Routing refill request to provider for review/approval because:

## 2022-12-12 ENCOUNTER — HOSPITAL ENCOUNTER (OUTPATIENT)
Dept: ULTRASOUND IMAGING | Facility: HOSPITAL | Age: 22
Discharge: HOME | End: 2022-12-12
Attending: OBSTETRICS & GYNECOLOGY | Admitting: OBSTETRICS & GYNECOLOGY
Payer: COMMERCIAL

## 2022-12-12 DIAGNOSIS — Z34.01 PREGNANCY, SUPERVISION OF FIRST, FIRST TRIMESTER: ICD-10-CM

## 2022-12-12 PROCEDURE — 76801 OB US < 14 WKS SINGLE FETUS: CPT

## 2022-12-22 ENCOUNTER — PRENATAL OFFICE VISIT (OUTPATIENT)
Dept: OBGYN | Facility: OTHER | Age: 22
End: 2022-12-22
Attending: OBSTETRICS & GYNECOLOGY
Payer: COMMERCIAL

## 2022-12-22 VITALS
SYSTOLIC BLOOD PRESSURE: 110 MMHG | DIASTOLIC BLOOD PRESSURE: 64 MMHG | WEIGHT: 106.9 LBS | TEMPERATURE: 97.2 F | HEART RATE: 75 BPM | BODY MASS INDEX: 21.55 KG/M2 | RESPIRATION RATE: 15 BRPM | OXYGEN SATURATION: 99 % | HEIGHT: 59 IN

## 2022-12-22 DIAGNOSIS — Z34.01 PREGNANCY, SUPERVISION OF FIRST, FIRST TRIMESTER: ICD-10-CM

## 2022-12-22 DIAGNOSIS — O21.9 NAUSEA AND VOMITING DURING PREGNANCY: ICD-10-CM

## 2022-12-22 PROBLEM — N92.6 IRREGULAR MENSES: Status: RESOLVED | Noted: 2022-05-23 | Resolved: 2022-12-22

## 2022-12-22 PROBLEM — N91.3 PRIMARY OLIGOMENORRHEA: Status: RESOLVED | Noted: 2022-05-23 | Resolved: 2022-12-22

## 2022-12-22 PROCEDURE — 99214 OFFICE O/P EST MOD 30 MIN: CPT | Performed by: OBSTETRICS & GYNECOLOGY

## 2022-12-22 PROCEDURE — G0463 HOSPITAL OUTPT CLINIC VISIT: HCPCS

## 2022-12-22 ASSESSMENT — PAIN SCALES - GENERAL: PAINLEVEL: NO PAIN (0)

## 2022-12-22 NOTE — PROGRESS NOTES
CHIEF COMPLAINT / REASON FOR VISIT  Patient presents for obstetrical confirmation exam    SUBJECTIVE  Trinh Gonzales is a 22 year old  with No LMP recorded (exact date). Patient is pregnant. and her Estimated Date of Delivery: Jul 15, 2023 determined by 9 wk ultrasound. Gestational age is 10w5d. The patient presents for obstetrical confirmation exam. The patient has polycystic ovarian syndrome with very irregular menses. This pregnancy is Planned, Desired. The patient is excited to be pregnant. Since becoming pregnant, she denies use of alcohol, tobacco and street drugs.  She has nausea and vomiting which has improved with Zofran.  She takes Metformin for polycystic ovarian syndrome.  She has depression and anxiety for which she takes Lexapro and Hydroxyzine.    Past Obstetric history: None.  Relationship with FOB: Boyfriend.  STD History: No STD history.  Last Pap smear history: Normal.  Nausea/vomiting: As above.  Vaginal bleeding: Denies.  Abdominal or pelvic pain, cramping, tenderness: Denies.  Difficulty urinating: Denies.  Breast tenderness: Denies.  Fatigue: Denies.  Headache or vision changes: Denies.  Depression symptoms: Asymptomatic on medication.  Other concerns: None.    ALLERGIES     Allergies   Allergen Reactions     Apple      Gums/mouth gets itchy     Cherry      Gums/mouth gets itchy     Pistachios [Nuts]        MEDICATIONS    Current Outpatient Medications:      escitalopram (LEXAPRO) 10 MG tablet, TAKE 1 TABLET(10 MG) BY MOUTH DAILY, Disp: 90 tablet, Rfl: 0     metFORMIN (GLUCOPHAGE) 500 MG tablet, TAKE 1 TABLET(500 MG) BY MOUTH TWICE DAILY WITH MEALS, Disp: 60 tablet, Rfl: 3     ondansetron (ZOFRAN ODT) 4 MG ODT tab, Take 1 tablet (4 mg) by mouth every 8 hours as needed for nausea or vomiting, Disp: 50 tablet, Rfl: 3     albuterol (PROAIR HFA/PROVENTIL HFA/VENTOLIN HFA) 108 (90 Base) MCG/ACT inhaler, Inhale 2 puffs into the lungs every 4 hours as needed (Patient not taking: Reported  "on 5/23/2022), Disp: , Rfl:      phenazopyridine (PYRIDIUM) 200 MG tablet, Take 1 tablet (200 mg) by mouth 3 times daily as needed for irritation (Patient not taking: Reported on 5/23/2022), Disp: 6 tablet, Rfl: 0    REVIEW OF SYSTEMS  As per HPI, otherwise negative.    The patient's Medical Hx, Surgical Hx, Obstetrical Hx, Social Hx, and Family Hx have been reviewed and updated in the electronic medical record.    OBJECTIVE  /64 (BP Location: Left arm, Cuff Size: Adult Regular)   Pulse 75   Temp 97.2  F (36.2  C) (Tympanic)   Resp 15   Ht 1.499 m (4' 11\")   Wt 48.5 kg (106 lb 14.4 oz)   LMP  (Exact Date)   SpO2 99%   BMI 21.59 kg/m      General:  Well-developed, well-nourished female in no apparent distress.  Mental:  Alert and oriented times three.  Affect pleasant.  Mood happy.  Abdomen: Soft, non tender, positive bowel sounds. No organomegaly. No rebound, no guarding. Fundal height below umbilicus.  Fetal heart tones auscultated at 170.  Cervix: deferred  Extremities:  No clubbing, cyanosis, or edema. Nontender bilaterally.    DIAGNOSTICS  12/12/2022 OB ultrasound: 9 2/7 wk, CRL is 2.54 cm, Yolk sac visualized, positive Cardiac activity, no adnexal mass, EDC is 07/15/2023.    ASSESSMENT / PLAN  1 Intrauterine pregnancy at 10w5d.  2 Unknown LMP, EDC 07/15/2023 by 9 wk ultrasound  3 Polycystic ovarian syndrome  4 Depression and anxiety  5 Nausea and vomiting in pregnancy    - Problem list reviewed and updated.  - Congratulations is given to the patient.  - I will be more than happy to follow the patient during this pregnancy.  - I discussed prenatal labs.  - I recommend that we check prenatal labs in 2 weeks.  - New OB history and physical exam ordered.  - I discussed first trimester obstetrics ultrasound.  - I discussed use of metformin in pregnancy.  - I discussed use of Lexapro and hydroxyzine in pregnancy.  - I recommend the patient see her primary provider and mental health counselor for " depression and anxiety follow-up.  - I discussed use of Zofran in pregnancy.  - Symptomatic relief measures for nausea and vomiting are reviewed with the patient.  - I reviewed routine obstetrical precautions, recommendations and instructions with the patient.  - I reviewed miscarriage precautions with the patient.  - I discussed routine healthy diet recommendations, exercise recommendations and weight gain recommendations in pregnancy.  - Follow up in 2 weeks.    Tai Hoang MD  Obstetrics and Gynecology

## 2023-01-11 LAB
ABO/RH(D): NORMAL
ANTIBODY SCREEN: NEGATIVE
SPECIMEN EXPIRATION DATE: NORMAL

## 2023-01-12 ENCOUNTER — PRENATAL OFFICE VISIT (OUTPATIENT)
Dept: OBGYN | Facility: OTHER | Age: 23
End: 2023-01-12
Attending: OBSTETRICS & GYNECOLOGY
Payer: COMMERCIAL

## 2023-01-12 VITALS
HEIGHT: 59 IN | SYSTOLIC BLOOD PRESSURE: 90 MMHG | DIASTOLIC BLOOD PRESSURE: 62 MMHG | WEIGHT: 105 LBS | BODY MASS INDEX: 21.17 KG/M2

## 2023-01-12 DIAGNOSIS — O21.9 NAUSEA AND VOMITING DURING PREGNANCY: ICD-10-CM

## 2023-01-12 DIAGNOSIS — Z34.01 PREGNANCY, SUPERVISION OF FIRST, FIRST TRIMESTER: Primary | ICD-10-CM

## 2023-01-12 DIAGNOSIS — Z12.4 SCREENING FOR MALIGNANT NEOPLASM OF CERVIX: ICD-10-CM

## 2023-01-12 LAB
ALBUMIN UR-MCNC: NEGATIVE MG/DL
AMORPH CRY #/AREA URNS HPF: ABNORMAL /HPF
APPEARANCE UR: ABNORMAL
BACTERIA #/AREA URNS HPF: ABNORMAL /HPF
BILIRUB UR QL STRIP: NEGATIVE
C TRACH DNA SPEC QL PROBE+SIG AMP: NEGATIVE
COLOR UR AUTO: YELLOW
ERYTHROCYTE [DISTWIDTH] IN BLOOD BY AUTOMATED COUNT: 12.4 % (ref 10–15)
GLUCOSE UR STRIP-MCNC: NEGATIVE MG/DL
HCT VFR BLD AUTO: 35.7 % (ref 35–47)
HGB BLD-MCNC: 12.3 G/DL (ref 11.7–15.7)
HGB UR QL STRIP: NEGATIVE
KETONES UR STRIP-MCNC: NEGATIVE MG/DL
LEUKOCYTE ESTERASE UR QL STRIP: ABNORMAL
MCH RBC QN AUTO: 31.6 PG (ref 26.5–33)
MCHC RBC AUTO-ENTMCNC: 34.5 G/DL (ref 31.5–36.5)
MCV RBC AUTO: 92 FL (ref 78–100)
N GONORRHOEA DNA SPEC QL NAA+PROBE: NEGATIVE
NITRATE UR QL: NEGATIVE
PH UR STRIP: 8 [PH] (ref 5–7)
PLATELET # BLD AUTO: 213 10E3/UL (ref 150–450)
RBC # BLD AUTO: 3.89 10E6/UL (ref 3.8–5.2)
RBC #/AREA URNS AUTO: ABNORMAL /HPF
SP GR UR STRIP: 1.01 (ref 1–1.03)
SQUAMOUS #/AREA URNS AUTO: ABNORMAL /LPF
TSH SERPL DL<=0.005 MIU/L-ACNC: 1.17 UIU/ML (ref 0.3–4.2)
UROBILINOGEN UR STRIP-ACNC: 1 E.U./DL
WBC # BLD AUTO: 5.4 10E3/UL (ref 4–11)
WBC #/AREA URNS AUTO: ABNORMAL /HPF

## 2023-01-12 PROCEDURE — 86803 HEPATITIS C AB TEST: CPT | Mod: ZL | Performed by: OBSTETRICS & GYNECOLOGY

## 2023-01-12 PROCEDURE — 36415 COLL VENOUS BLD VENIPUNCTURE: CPT | Mod: ZL | Performed by: OBSTETRICS & GYNECOLOGY

## 2023-01-12 PROCEDURE — 81001 URINALYSIS AUTO W/SCOPE: CPT | Performed by: OBSTETRICS & GYNECOLOGY

## 2023-01-12 PROCEDURE — 86900 BLOOD TYPING SEROLOGIC ABO: CPT | Performed by: OBSTETRICS & GYNECOLOGY

## 2023-01-12 PROCEDURE — 86901 BLOOD TYPING SEROLOGIC RH(D): CPT | Performed by: OBSTETRICS & GYNECOLOGY

## 2023-01-12 PROCEDURE — 87591 N.GONORRHOEAE DNA AMP PROB: CPT | Performed by: OBSTETRICS & GYNECOLOGY

## 2023-01-12 PROCEDURE — 87491 CHLMYD TRACH DNA AMP PROBE: CPT | Performed by: OBSTETRICS & GYNECOLOGY

## 2023-01-12 PROCEDURE — G0123 SCREEN CERV/VAG THIN LAYER: HCPCS | Mod: ZL | Performed by: OBSTETRICS & GYNECOLOGY

## 2023-01-12 PROCEDURE — 36415 COLL VENOUS BLD VENIPUNCTURE: CPT | Performed by: OBSTETRICS & GYNECOLOGY

## 2023-01-12 PROCEDURE — 87389 HIV-1 AG W/HIV-1&-2 AB AG IA: CPT | Performed by: OBSTETRICS & GYNECOLOGY

## 2023-01-12 PROCEDURE — 87389 HIV-1 AG W/HIV-1&-2 AB AG IA: CPT | Mod: ZL | Performed by: OBSTETRICS & GYNECOLOGY

## 2023-01-12 PROCEDURE — 87086 URINE CULTURE/COLONY COUNT: CPT | Performed by: OBSTETRICS & GYNECOLOGY

## 2023-01-12 PROCEDURE — 87086 URINE CULTURE/COLONY COUNT: CPT | Mod: ZL | Performed by: OBSTETRICS & GYNECOLOGY

## 2023-01-12 PROCEDURE — 87340 HEPATITIS B SURFACE AG IA: CPT | Performed by: OBSTETRICS & GYNECOLOGY

## 2023-01-12 PROCEDURE — G0463 HOSPITAL OUTPT CLINIC VISIT: HCPCS

## 2023-01-12 PROCEDURE — 86762 RUBELLA ANTIBODY: CPT | Mod: ZL | Performed by: OBSTETRICS & GYNECOLOGY

## 2023-01-12 PROCEDURE — 87491 CHLMYD TRACH DNA AMP PROBE: CPT | Mod: ZL | Performed by: OBSTETRICS & GYNECOLOGY

## 2023-01-12 PROCEDURE — 85027 COMPLETE CBC AUTOMATED: CPT | Mod: ZL | Performed by: OBSTETRICS & GYNECOLOGY

## 2023-01-12 PROCEDURE — 81001 URINALYSIS AUTO W/SCOPE: CPT | Mod: ZL | Performed by: OBSTETRICS & GYNECOLOGY

## 2023-01-12 PROCEDURE — 86780 TREPONEMA PALLIDUM: CPT | Mod: ZL | Performed by: OBSTETRICS & GYNECOLOGY

## 2023-01-12 PROCEDURE — 86850 RBC ANTIBODY SCREEN: CPT | Performed by: OBSTETRICS & GYNECOLOGY

## 2023-01-12 PROCEDURE — 84443 ASSAY THYROID STIM HORMONE: CPT | Mod: ZL | Performed by: OBSTETRICS & GYNECOLOGY

## 2023-01-12 PROCEDURE — 99207 PR FIRST OB VISIT: CPT | Performed by: OBSTETRICS & GYNECOLOGY

## 2023-01-12 PROCEDURE — 87340 HEPATITIS B SURFACE AG IA: CPT | Mod: ZL | Performed by: OBSTETRICS & GYNECOLOGY

## 2023-01-12 PROCEDURE — G0123 SCREEN CERV/VAG THIN LAYER: HCPCS | Performed by: OBSTETRICS & GYNECOLOGY

## 2023-01-12 PROCEDURE — 86803 HEPATITIS C AB TEST: CPT | Performed by: OBSTETRICS & GYNECOLOGY

## 2023-01-12 PROCEDURE — 86780 TREPONEMA PALLIDUM: CPT | Performed by: OBSTETRICS & GYNECOLOGY

## 2023-01-12 PROCEDURE — 85027 COMPLETE CBC AUTOMATED: CPT | Performed by: OBSTETRICS & GYNECOLOGY

## 2023-01-12 PROCEDURE — 86762 RUBELLA ANTIBODY: CPT | Performed by: OBSTETRICS & GYNECOLOGY

## 2023-01-12 PROCEDURE — 84443 ASSAY THYROID STIM HORMONE: CPT | Performed by: OBSTETRICS & GYNECOLOGY

## 2023-01-12 ASSESSMENT — ANXIETY QUESTIONNAIRES
2. NOT BEING ABLE TO STOP OR CONTROL WORRYING: SEVERAL DAYS
1. FEELING NERVOUS, ANXIOUS, OR ON EDGE: SEVERAL DAYS
7. FEELING AFRAID AS IF SOMETHING AWFUL MIGHT HAPPEN: NOT AT ALL
6. BECOMING EASILY ANNOYED OR IRRITABLE: NOT AT ALL
GAD7 TOTAL SCORE: 2
3. WORRYING TOO MUCH ABOUT DIFFERENT THINGS: NOT AT ALL
IF YOU CHECKED OFF ANY PROBLEMS ON THIS QUESTIONNAIRE, HOW DIFFICULT HAVE THESE PROBLEMS MADE IT FOR YOU TO DO YOUR WORK, TAKE CARE OF THINGS AT HOME, OR GET ALONG WITH OTHER PEOPLE: NOT DIFFICULT AT ALL
GAD7 TOTAL SCORE: 2
5. BEING SO RESTLESS THAT IT IS HARD TO SIT STILL: NOT AT ALL

## 2023-01-12 ASSESSMENT — PATIENT HEALTH QUESTIONNAIRE - PHQ9
5. POOR APPETITE OR OVEREATING: NOT AT ALL
SUM OF ALL RESPONSES TO PHQ QUESTIONS 1-9: 0

## 2023-01-12 ASSESSMENT — PAIN SCALES - GENERAL: PAINLEVEL: NO PAIN (0)

## 2023-01-12 NOTE — PROGRESS NOTES
Have you had or do you currently have:n    - Diabetes? n  - Hypertension? n  - Heart disease, mitral valve prolapse, or rheumatic fever?  n  - An autoimmune disease such as lupus or rheumatoid arthritis?  n  - Kidney disease, urinary tract infection?  y  - Epilepsy, seizures, or spells?  n  - Migraine headaches?  y  - Any other neurological problems?  n  - Have you ever been treated for depression?  y  - Are you having problems with crying spells or loss of self-esteem?  n  - Have you ever required psychiatric care?  y  - Have you ever had hepatitis, liver disease, or jaundice?  n  - Have you ever been treated for blood clots in your veins, deep vein thrombosis, inflammation in the veins, thrombosis, phelbitis, pulmonary embolism or varicosities?  n  - Have you had excessive bleeding after surgery or dental work?  n  - Do you bleed more than other women after a cut or scratch?  n  - Do you have a history or anemia?  y  - Have you ever had thyroid problems or take thyroid medication?  n  - Do you have any endocrine problems?  n  - Have you every been in a major accident or suffered serious trauma?  n  - Within the last year, has anyone hit, slapped, kicked, or otherwise hurt you?  n  - In the last year, has anyone forced you to have sex when you didn't want to?  n  - Have you every received a blood transfusion?  n  - Would you refuse a blood transfusion if a doctor judged it to be medically necessary?  nn  - Does anyone in your home smoke? n  - Do you use tobacco products?  n  - Do you drink beer, wine, or hard liquor?  n  - Do you use any of the following: marijuana, speed, cocaine, heroin, hallucinogens, or other drugs?  n  - Is your blood type RH negative?  n  - Have you ever had asthma?  n  - Have you ever had tuberculosis?  n  - Do you have any allergies to drugs or over-the-counter medications?  n  - Allergies: dust mites, aspartame, ethanol, venlafaxine hydrochloride, sertraline?  n  - Have you had any breast  problems?  n  - Have you ever breast-fed?  n  - Have you had any gynecological surgical procedures such as cervical conization, LEEP, laser treatment, cryosurgery of the cervix, or a dilatation and curettage, etc?  n  - Have you ever had any other surgical procedures?  n  - Have you ever had any anesthetic complications?  n  - Have you ever had an abnormal pap smear?  n  - Do you have a history of abnormalities of the uterus? n  - Did your mother take ADELAIDA or any other hormones when she was pregnant with you?  n  - Did it take you more than one year to become pregnant?  y  - Have you ever been evaluated or treated for infertility?  y  - Is there a history of medical problems in your family, which you feel might adversely affect your health or pregnancy?  n  - Do you have any other problems we have not asked about which you feel may be important to this pregnancy?  n    Symptoms since last menstrual period  - Do you currently have any of the following symptoms: abdominal pain, blood in the stool or urine, chest pain, shortness of breath, coughing or vomiting up blood, you heart is racing or skipping beats, nausea and vomiting, pain on urination, or vaginal discharge or bleeding?  n    Genetic screening  Has the patient, baby's father, or anyone in either family had:  - Thalassemia (Italian, Greek, Mediterranean, or  background only) and an MCV result less than 80?  n  - Neural tube defect such as meningomyelocele, spina bifida, or anencephaly?  n  - Congential heart defect?  n  - Down's syndrome?  n  - Caleb-Sachs disease ( Episcopal, Cajun, Thai-Hendricks)?  n  - Sickle cell disease or trait () ?  n  - Hemophilia or other inherited problems of blood?  n  - Muscular dystrophy?  n  - Cystic fibrosis?  n  - Franconia's chorea?  n  - Mental retardation/autism?  n   If yes, was the person tested for Fragile X?  n  - Any other inherited genetic or chromosomal disorder?  n  - Maternal metabolic disorder (e.g.  insulin- dependent diabetes, PKU)?  n  - A child with birth defects not listed above?  n  - Recurrent pregnancy loss, or a stillbirth?  n  - Has the patient had any medications/street drugs/alcohol since her last menstrual period?  n  - Does the patient or baby's father have any other genetic risk?  n    Infection history  - Have you ever been treated for tuberculosis?  n  - Have you every had a positive skin test for tuberculosis?  n  - Do you live with someone who has tuberculosis?  n  - Have you ever been exposed to tuberculosis?  n  - Do you have genital herpes?  n  - Does your partner have genital herpes?  n  - Have you had a rash or viral illness since your last period?  n  - Have you ever had gonorrhea, chlamydia, syphilis, venereal warts, trichomoniasis, pelvic inflammatory disease, or any other sexually transmitted disease?  n  - Have you had chicken pox?  n  - Have you been vaccinated against chicken pox?  nn  - Have you had any other infectious diseases?  n

## 2023-01-12 NOTE — PROGRESS NOTES
CHIEF COMPLAINT / REASON FOR VISIT  Patient presents for first obstetrical history and physical exam.    SUBJECTIVE  Trinh Gonzales is a 22 year old  with No LMP recorded (exact date). Patient is pregnant. and her Estimated Date of Delivery: Jul 15, 2023 determined by 9 wk ultrasound. Gestational age is 13w5d. The patient presents for first obstetrical history and physical exam.  The patient has polycystic ovarian syndrome with very irregular menses. This pregnancy is Planned, Desired. The patient is excited to be pregnant. Since becoming pregnant, she denies use of alcohol, tobacco and street drugs.  Her depression and anxiety are stable with Lexapro and Hydroxyzine.  She has daily nausea with rare emesis on Zofran ODT.  She continues to take Metformin for polycystic ovarian syndrome.    Past Obstetric history: None.  Relationship with FOB: Boyfriend.  STD History: No STD history.  Last Pap smear history: Normal.  Nausea/vomiting: As above.  Vaginal bleeding: Denies.  Leaking of fluid: Denies.  Abdominal or pelvic pain, cramping, contractions, tenderness: Denies.  Difficulty urinating: Denies.  Breast tenderness: Denies.  Fatigue: Denies.  Headache or vision changes: Denies.  Depression symptoms: Denies symptoms on medication as above.  Other concerns: None.    ALLERGIES     Allergies   Allergen Reactions     Apple      Gums/mouth gets itchy     Cherry      Gums/mouth gets itchy     Pistachios [Nuts]        MEDICATIONS    Current Outpatient Medications:      albuterol (PROAIR HFA/PROVENTIL HFA/VENTOLIN HFA) 108 (90 Base) MCG/ACT inhaler, Inhale 2 puffs into the lungs every 4 hours as needed, Disp: , Rfl:      escitalopram (LEXAPRO) 10 MG tablet, TAKE 1 TABLET(10 MG) BY MOUTH DAILY, Disp: 90 tablet, Rfl: 0     metFORMIN (GLUCOPHAGE) 500 MG tablet, TAKE 1 TABLET(500 MG) BY MOUTH TWICE DAILY WITH MEALS, Disp: 60 tablet, Rfl: 3     ondansetron (ZOFRAN ODT) 4 MG ODT tab, Take 1 tablet (4 mg) by mouth every 8  hours as needed for nausea or vomiting, Disp: 50 tablet, Rfl: 3     phenazopyridine (PYRIDIUM) 200 MG tablet, Take 1 tablet (200 mg) by mouth 3 times daily as needed for irritation, Disp: 6 tablet, Rfl: 0     Prenatal MV & Min w/FA-DHA (PRENATAL GUMMIES) 0.18-25 MG CHEW, , Disp: , Rfl:     REVIEW OF SYSTEMS  General:  No fever, chills, fatigue, unintentional weight loss, or weight gain  HEENT:  No sore throat, nasal congestion, changes in vision or changes in hearing  Cardiovascular:  No chest pain, irregular heartbeat or racing heart  Respiratory:  No shortness of breath, cough, or wheezing  Gastrointestinal:  No nausea, vomiting, diarrhea, constipation or abdominal pain  Genitourinary:  No leaking urine, pain with urination, burning with urination, irregular vaginal bleeding, heavy periods, painful periods, abnormal vaginal discharge or leaking gas or stool  Skin:  No rashes or skin lesions  Breasts:  No masses or lumps, positive for discharge from the nipples  Neuro:  No difficulty with memory, numbness, tingling, or falls  Psych:  No anxiety, depression or difficulty sleeping  Endocrine:  No excessive thirst, hair loss, intolerance of heat or cold    Past Medical History:   Diagnosis Date     Adjustment disorder with depressed mood 1/22/2018     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 IMO Update     Irregular menses 5/23/2022     PCOS (polycystic ovarian syndrome) 7/18/2022     Primary oligomenorrhea 5/23/2022     PTSD (post-traumatic stress disorder) 1/22/2018     Suicidal behavior 5/8/2016     Thelarche, premature 7/29/2008    Overview:  Asymmetric.  Slight nipple enlargement on left side.  Right side has breast tissue which is probably 3-4 cm. IMO Update 10/11  Formatting of this note might be different from the original. Asymmetric.  Slight nipple enlargement  "on left side.  Right side has breast tissue which is probably 3-4 cm. IMO Update 10/11       Past Surgical History:   Procedure Laterality Date     WISDOM TOOTH EXTRACTION Bilateral        OB History    Para Term  AB Living   1 0 0 0 0 0   SAB IAB Ectopic Multiple Live Births   0 0 0 0 0      # Outcome Date GA Lbr Gavin/2nd Weight Sex Delivery Anes PTL Lv   1 Current                Social History     Tobacco Use     Smoking status: Never     Smokeless tobacco: Never   Substance Use Topics     Alcohol use: No     History   Sexual Activity     Sexual activity: Yes     Partners: Male     Birth control/ protection: None       Family History   Problem Relation Age of Onset     Mental Illness Mother      Mental Illness Father        Immunization History   Administered Date(s) Administered     COVID-19 Vaccine 18+ (Moderna) 2021, 2021     Comvax (HIB/HepB) 1999, 2000, 2001     DTAP (<7y) 1999, 2000, 2000, 2001, 2004     HPV9 2016, 2018     Influenza (IIV3) PF 10/22/2008     MMR 2000, 10/03/2001     Meningococcal ACWY (Menactra ) 2016     Pneumococcal (PCV 7) 2000, 2001     Poliovirus, inactivated (IPV) 1999, 2000, 2000, 10/03/2001     Tdap (Adacel,Boostrix) 2012     Varicella 2000, 2012       OBJECTIVE  BP 90/62   Ht 1.499 m (4' 11\")   Wt 47.6 kg (105 lb)   LMP  (Exact Date)   BMI 21.21 kg/m      General:  Well-developed, well-nourished female in no apparent distress.  Mental:  Alert and oriented times three.  Affect pleasant.  Mood happy.  Neurological: Alert and oriented x3. Cranial nerves II through XII grossly intact.  Reflexes 2+ at patella bilaterally.  No gross motor or sensory deficits noted.  Skin:  No rashes or lesions  Head:  Normocephalic, atraumatic  ENT:  Posterior oropharynx without erythema or exudates. Normal dentition. Trachea midline, no cervical " adenopathy, Vision and hearing grossly intact. Tympanic membranes pearly white bilaterally with positive light reflex. Nasal turbinates are not erythematous or edematous.  Eyes:  Sclerae without injection or icterus.  Pupils equally round and reactive to light and accommodation bilaterally.  Lymph Nodes:  Neck supple.  No cervical, supraclavicular or axillary lymphadenopathy.  Thyroid:  No thyromegaly or tenderness bilaterally.  Breast:  Breasts are bilaterally symmetrical. No masses, tenderness, retractions, skin changes, erythema, discharge or adenopathy bilaterally.  Lungs:  Clear to auscultation bilaterally with good inspiratory effort.  No wheezing rhonchi or rales noted. Breathing nonlabored.  Heart:  Regular rate and rhythm without murmur. No JVD.  No peripheral vascular disease.  Abdomen: Soft, non tender, nondistended, positive bowel sounds.  No organomegaly. No rebound, no guarding. Fundal height palpated below umbilicus.  Fetal heart tones auscultated at 156.  Pelvic exam:  Normal external female genitalia without lesions or abnormalities. Normal pubic hair distribution. Urethral meatus normal in appearance and without masses. Normal Bartholin, Urethral and Bloomfield's glands.  Well estrogenized vaginal mucosa. No vaginal lesions. The bladder and urethra are nontender to palpation. Normal nulliparous cervix without lesions or abnormalities. The cervix is long, closed and thick.  No cervical motion tenderness to palpation. Uterus is 14 wk size, mobile, and non tender.  No adnexal masses or tenderness bilaterally.  Rectal exam:  No external hemorrhoids noted. No rectovaginal nodularity noted. Examination confirms the vaginal exam.  Extremities:  No clubbing cyanosis or edema. Non tender bilaterally.  Spine:  No spinal, paraspinal or costovertebral angle tenderness bilaterally.  Gait:  Normal.      Chaperone: Andreia Nagy LPN    DIAGNOSTICS  01/12/2023 Pap pending  01/12/2023 NOB labs pending  01/12/2023 NIPT  pending    2022 OB ultrasound: 9 2/7 wk, CRL is 2.54 cm, Yolk sac visualized, positive Cardiac activity, no adnexal mass, EDC is 07/15/2023.    PHQ-9 score:    PHQ 2023   PHQ-9 Total Score 0   Q9: Thoughts of better off dead/self-harm past 2 weeks Not at all     Pregnancy Dating:   LMP: unknown  LAURITA by 9 wk ultrasound: 07/15/2023  [facility]  's provider: Undecided    ASSESSMENT / PLAN  22 year old  at 13w5d who presents for first obstetrical history and physical exam.    1 Intrauterine pregnancy at 13w5d.  2 EDC set by 9 wk ultrasound  3 Polycystic ovarian syndrome  4 Depression and anxiety  5 Nausea and vomiting in pregnancy    Anticipated course of pregnancy care:  - I discussed what to expect in regards to pregnancy care including frequency of visits, routine prenatal labs, obstetric ultrasounds and immunizations during pregnancy, and how to contact both the clinic and the hospital. The patient asked appropriate questions these were answered for her.    Labs:  - Prenatal labs are ordered today.  - Pap smear obtained today.  - NIPT obtained today  - I discussed options for screening for and diagnosis of chromosomal anomalies, including first trimester screen, noninvasive prenatal testing/cell-free fetal DNA testing, CVS/amniocentesis, quad screen, and fetal survey ultrasound or comprehensive Level II US at 18-20 weeks. She desires noninvasive prenatal testing and ultrasound at 18-20 weeks.    Immunizations:  - Plan TdaP at 28 weeks.  -Patient declines influenza vaccine.    - Problem list reviewed and updated.  - I recommend that the patient take prenatal vitamins.  - I discussed first trimester obstetrics ultrasound.  - I discussed use of Lexapro and Hydroxyzine in pregnancy.  - I recommend the patient see mental health counselor for depression anxiety follow-up.  - I discussed use of Metformin in pregnancy.  - I discussed use of Zofran in pregnancy.  -  Symptomatic relief measures for nausea and vomiting in pregnancy reviewed with the patient.  - I reviewed routine obstetrical precautions, recommendations and instructions with the patient including fetal movement and kick count instructions.  - I reviewed miscarriage precautions with the patient.  - I discussed routine healthy diet recommendations, foods to avoid in pregnancy, exercise recommendations and weight gain recommendations in pregnancy, avoiding exposure to toxoplasmosis, and maintenance of a generally healthy lifestyle.  - BMI is Body mass index is 21.21 kg/m .. Recommended weight gain is: 25-35 lbs (pregravid BMI 18.5-24.9).  - I recommend the patient see clinic nurse for OB education visit.  - Follow up in 3-4 weeks. She is encouraged to call sooner with questions or concerns.    Tai Hoang MD  Obstetrics and Gynecology

## 2023-01-13 LAB
BACTERIA UR CULT: NORMAL
HBV SURFACE AG SERPL QL IA: NONREACTIVE
HCV AB SERPL QL IA: NONREACTIVE
HIV 1+2 AB+HIV1 P24 AG SERPL QL IA: NONREACTIVE
RUBV IGG SERPL QL IA: 1.66 INDEX
RUBV IGG SERPL QL IA: POSITIVE
T PALLIDUM AB SER QL: NONREACTIVE

## 2023-01-18 ENCOUNTER — HOSPITAL ENCOUNTER (EMERGENCY)
Facility: HOSPITAL | Age: 23
Discharge: HOME | End: 2023-01-18
Attending: PHYSICIAN ASSISTANT | Admitting: PHYSICIAN ASSISTANT
Payer: COMMERCIAL

## 2023-01-18 ENCOUNTER — APPOINTMENT (OUTPATIENT)
Dept: ULTRASOUND IMAGING | Facility: HOSPITAL | Age: 23
End: 2023-01-18
Attending: PHYSICIAN ASSISTANT
Payer: COMMERCIAL

## 2023-01-18 VITALS
HEART RATE: 87 BPM | TEMPERATURE: 98.3 F | RESPIRATION RATE: 20 BRPM | SYSTOLIC BLOOD PRESSURE: 106 MMHG | OXYGEN SATURATION: 100 % | DIASTOLIC BLOOD PRESSURE: 73 MMHG

## 2023-01-18 DIAGNOSIS — R31.0 GROSS HEMATURIA: ICD-10-CM

## 2023-01-18 DIAGNOSIS — R10.9 FLANK PAIN: Primary | ICD-10-CM

## 2023-01-18 DIAGNOSIS — Z3A.14 14 WEEKS GESTATION OF PREGNANCY: ICD-10-CM

## 2023-01-18 LAB
ALBUMIN UR-MCNC: 30 MG/DL
AMORPH CRY #/AREA URNS HPF: ABNORMAL /HPF
ANION GAP SERPL CALCULATED.3IONS-SCNC: 12 MMOL/L (ref 7–15)
APPEARANCE UR: ABNORMAL
BASOPHILS # BLD AUTO: 0 10E3/UL (ref 0–0.2)
BASOPHILS NFR BLD AUTO: 0 %
BILIRUB UR QL STRIP: NEGATIVE
BKR LAB AP GYN ADEQUACY: NORMAL
BKR LAB AP GYN INTERPRETATION: NORMAL
BKR LAB AP HPV REFLEX: NO
BKR LAB AP PREVIOUS ABNORMAL: NORMAL
BUN SERPL-MCNC: 7 MG/DL (ref 6–20)
CALCIUM SERPL-MCNC: 9.3 MG/DL (ref 8.6–10)
CHLORIDE SERPL-SCNC: 102 MMOL/L (ref 98–107)
COLOR UR AUTO: YELLOW
CREAT SERPL-MCNC: 0.47 MG/DL (ref 0.51–0.95)
DEPRECATED HCO3 PLAS-SCNC: 21 MMOL/L (ref 22–29)
EOSINOPHIL # BLD AUTO: 0.1 10E3/UL (ref 0–0.7)
EOSINOPHIL NFR BLD AUTO: 2 %
ERYTHROCYTE [DISTWIDTH] IN BLOOD BY AUTOMATED COUNT: 12.5 % (ref 10–15)
GFR SERPL CREATININE-BSD FRML MDRD: >90 ML/MIN/1.73M2
GLUCOSE SERPL-MCNC: 89 MG/DL (ref 70–99)
GLUCOSE UR STRIP-MCNC: NEGATIVE MG/DL
HCT VFR BLD AUTO: 36.8 % (ref 35–47)
HGB BLD-MCNC: 12.8 G/DL (ref 11.7–15.7)
HGB UR QL STRIP: ABNORMAL
HOLD SPECIMEN: NORMAL
IMM GRANULOCYTES # BLD: 0 10E3/UL
IMM GRANULOCYTES NFR BLD: 0 %
KETONES UR STRIP-MCNC: NEGATIVE MG/DL
LEUKOCYTE ESTERASE UR QL STRIP: ABNORMAL
LYMPHOCYTES # BLD AUTO: 1.1 10E3/UL (ref 0.8–5.3)
LYMPHOCYTES NFR BLD AUTO: 13 %
MCH RBC QN AUTO: 31.2 PG (ref 26.5–33)
MCHC RBC AUTO-ENTMCNC: 34.8 G/DL (ref 31.5–36.5)
MCV RBC AUTO: 90 FL (ref 78–100)
MONOCYTES # BLD AUTO: 0.3 10E3/UL (ref 0–1.3)
MONOCYTES NFR BLD AUTO: 4 %
MUCOUS THREADS #/AREA URNS LPF: PRESENT /LPF
NEUTROPHILS # BLD AUTO: 6.6 10E3/UL (ref 1.6–8.3)
NEUTROPHILS NFR BLD AUTO: 81 %
NITRATE UR QL: NEGATIVE
NRBC # BLD AUTO: 0 10E3/UL
NRBC BLD AUTO-RTO: 0 /100
PATH REPORT.COMMENTS IMP SPEC: NORMAL
PATH REPORT.COMMENTS IMP SPEC: NORMAL
PATH REPORT.RELEVANT HX SPEC: NORMAL
PH UR STRIP: 7 [PH] (ref 4.7–8)
PLATELET # BLD AUTO: 218 10E3/UL (ref 150–450)
POTASSIUM SERPL-SCNC: 3.5 MMOL/L (ref 3.4–5.3)
RBC # BLD AUTO: 4.1 10E6/UL (ref 3.8–5.2)
RBC URINE: >182 /HPF
RENAL TUB EPI: 1 /HPF
SODIUM SERPL-SCNC: 135 MMOL/L (ref 136–145)
SP GR UR STRIP: 1.02 (ref 1–1.03)
SQUAMOUS EPITHELIAL: 3 /HPF
UROBILINOGEN UR STRIP-MCNC: 2 MG/DL
WBC # BLD AUTO: 8.2 10E3/UL (ref 4–11)
WBC URINE: 17 /HPF

## 2023-01-18 PROCEDURE — 99285 EMERGENCY DEPT VISIT HI MDM: CPT | Mod: 25

## 2023-01-18 PROCEDURE — 250N000013 HC RX MED GY IP 250 OP 250 PS 637: Performed by: PHYSICIAN ASSISTANT

## 2023-01-18 PROCEDURE — 85025 COMPLETE CBC W/AUTO DIFF WBC: CPT | Performed by: PHYSICIAN ASSISTANT

## 2023-01-18 PROCEDURE — 87086 URINE CULTURE/COLONY COUNT: CPT | Performed by: PHYSICIAN ASSISTANT

## 2023-01-18 PROCEDURE — 99284 EMERGENCY DEPT VISIT MOD MDM: CPT | Performed by: PHYSICIAN ASSISTANT

## 2023-01-18 PROCEDURE — 81001 URINALYSIS AUTO W/SCOPE: CPT | Performed by: PHYSICIAN ASSISTANT

## 2023-01-18 PROCEDURE — 76815 OB US LIMITED FETUS(S): CPT

## 2023-01-18 PROCEDURE — 80048 BASIC METABOLIC PNL TOTAL CA: CPT | Performed by: PHYSICIAN ASSISTANT

## 2023-01-18 PROCEDURE — 36415 COLL VENOUS BLD VENIPUNCTURE: CPT | Performed by: PHYSICIAN ASSISTANT

## 2023-01-18 PROCEDURE — 76770 US EXAM ABDO BACK WALL COMP: CPT

## 2023-01-18 RX ORDER — KETOROLAC TROMETHAMINE 30 MG/ML
30 INJECTION, SOLUTION INTRAMUSCULAR; INTRAVENOUS ONCE
Status: COMPLETED | OUTPATIENT
Start: 2023-01-18 | End: 2023-01-18

## 2023-01-18 RX ORDER — CEPHALEXIN 250 MG/5ML
500 POWDER, FOR SUSPENSION ORAL 3 TIMES DAILY
Qty: 200 ML | Refills: 0 | Status: SHIPPED | OUTPATIENT
Start: 2023-01-18 | End: 2023-01-25

## 2023-01-18 RX ORDER — CEPHALEXIN 500 MG/1
500 CAPSULE ORAL ONCE
Status: COMPLETED | OUTPATIENT
Start: 2023-01-18 | End: 2023-01-18

## 2023-01-18 RX ORDER — ACETAMINOPHEN 325 MG/1
975 TABLET ORAL ONCE
Status: COMPLETED | OUTPATIENT
Start: 2023-01-18 | End: 2023-01-18

## 2023-01-18 RX ADMIN — CEPHALEXIN 500 MG: 500 CAPSULE ORAL at 17:50

## 2023-01-18 RX ADMIN — ACETAMINOPHEN 975 MG: 325 TABLET, FILM COATED ORAL at 16:42

## 2023-01-18 ASSESSMENT — ENCOUNTER SYMPTOMS
FLANK PAIN: 1
FREQUENCY: 1
HEMATURIA: 1
CONSTITUTIONAL NEGATIVE: 1
GASTROINTESTINAL NEGATIVE: 1

## 2023-01-18 ASSESSMENT — ACTIVITIES OF DAILY LIVING (ADL): ADLS_ACUITY_SCORE: 35

## 2023-01-18 NOTE — ED PROVIDER NOTES
History     Chief Complaint   Patient presents with     Back Pain     Flank Pain     Trinh Gonzales is a 22 year old female that presents to the emergency department today by herself. She is 14 weeks and 4 days pregnant (LAURITA 7/15/2023). . PMH includes PCOS. She reports left sided back pain that came on suddenly this afternoon while she was at work, approx a half hour prior to arrival. Pain was sudden, sharp, and wraps around her upper abdomen. The pain was worse with inspiration and had made her double over. She did not take medication for the pain. She reports frequent urination, no burning. She noticed blood and clots for the first time in the urine sample she gave in the ER today, which she never had before. She is unsure if the blood is from her urethra or her vagina, as she had a small amount of brown blood on her pad as well. She has no fevers, nausea, vomiting, or lower abdominal pain or cramping.   Trinh was seen for a prenatal appointment 2023 and was told that her urine sample was normal from that appointment. Urine culture was negative. She reports no concerns with this pregnancy. No trauma to her abdomen.    The history is provided by the patient and medical records.       Allergies:  Allergies   Allergen Reactions     Apple      Gums/mouth gets itchy     Cherry      Gums/mouth gets itchy     Pistachios [Nuts]        Problem List:    Patient Active Problem List    Diagnosis Date Noted     Pregnancy, supervision of first, first trimester 2022     Priority: Medium     Pregnancy Overview  22 year old  with LMP unknown, LAURITA 07/15/2023.    1 EDC 07/15/2023 set by 9 wk ultrasound  2 Polycystic ovarian syndrome  3 Depression and anxiety  4 Nausea and vomiting in pregnancy    - Metformin  - Lexapro  - Hydroxyzine  - Zofran ODT  - Plan TdaP at 28 wk.  - Decline Influenza  - NIPT pending  - Covid immunization x2    Type and Rh: O Positive  ABS: Negative  Rubella: Positive  Treponema:  Negative  HBsAg: Negative  Hepatitis C: Negative  HIV: Negative  Hgb: 12.3  Hematocrit: 35.7  Plt: 213  TSH: 1.17  Chlamydia: Negative  Gonorrhea: Negative  Ucx: negative  Glucose screen:   GBS:     01/12/2023 Pap NILM       Nausea and vomiting during pregnancy 12/22/2022     Priority: Medium     PCOS (polycystic ovarian syndrome) 07/18/2022     Priority: Medium     Anxiety 12/11/2019     Priority: Medium     Adjustment disorder with depressed mood 01/22/2018     Priority: Medium     PTSD (post-traumatic stress disorder) 01/22/2018     Priority: Medium     Suicidal behavior 05/08/2016     Priority: Medium     Adjustment disorder with mixed disturbance of emotions and conduct 05/08/2016     Priority: Medium     Astigmatism 11/30/2010     Priority: Medium     Formatting of this note might be different from the original.  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     Anxiety 12/11/2019     Astigmatism 11/30/2010     Irregular menses 5/23/2022     PCOS (polycystic ovarian syndrome) 7/18/2022     Primary oligomenorrhea 5/23/2022     PTSD (post-traumatic stress disorder) 1/22/2018     Suicidal behavior 5/8/2016     Thelarche, premature 7/29/2008       Past Surgical History:    Past Surgical History:   Procedure Laterality Date     WISDOM TOOTH EXTRACTION Bilateral 2021       Family History:    Family History   Problem Relation Age of Onset     Mental Illness Mother      Mental Illness Father        Social History:  Marital Status:   [2]  Social History     Tobacco Use     Smoking status: Never     Smokeless tobacco: Never   Substance Use Topics     Alcohol use: No     Drug use: No        Medications:    albuterol (PROAIR HFA/PROVENTIL HFA/VENTOLIN HFA) 108 (90 Base) MCG/ACT inhaler  cephALEXin (KEFLEX) 250 MG/5ML suspension  escitalopram  (LEXAPRO) 10 MG tablet  metFORMIN (GLUCOPHAGE) 500 MG tablet  ondansetron (ZOFRAN ODT) 4 MG ODT tab  phenazopyridine (PYRIDIUM) 200 MG tablet  Prenatal MV & Min w/FA-DHA (PRENATAL GUMMIES) 0.18-25 MG CHEW          Review of Systems   Constitutional: Negative.    Gastrointestinal: Negative.    Genitourinary: Positive for flank pain, frequency and hematuria.       Physical Exam   BP: 118/75  Pulse: 87  Temp: 98.3  F (36.8  C)  Resp: 20  SpO2: 100 %      Physical Exam  Vitals and nursing note reviewed.   Constitutional:       Appearance: Normal appearance.   Abdominal:      Palpations: Abdomen is soft.      Tenderness: There is left CVA tenderness.   Skin:     General: Skin is warm and dry.      Capillary Refill: Capillary refill takes less than 2 seconds.   Neurological:      Mental Status: She is alert.         ED Course     Mental Health Risk Assessment      PSS-3    Date and Time Over the past 2 weeks have you felt down, depressed, or hopeless? Over the past 2 weeks have you had thoughts of killing yourself? Have you ever attempted to kill yourself? When did this last happen? User   01/18/23 1506 no no yes more than 6 months ago MJA                  ED Course as of 01/18/23 1759   Wed Jan 18, 2023   1649 Independent review of the patient's ultrasounds appear to have viable intrauterine pregnancy at the stated gestation as well as mild hydronephrosis on the left.  Plan will be for urology consult for likely ureteral stone as the etiology of the patient's symptoms.   1720 Consultation with Dr. Zimmerman, OB/GYN on-call for discussion of pain control.  Single dose of Toradol will be provided in the emergency department with home narcotics.   1724 Consultation with Dr. Cowan at [facility] urology.  Plan will be for prophylactic Keflex and close clinic follow-up.     Procedures              Critical Care time:  none               Results for orders placed or performed during the hospital encounter of 01/18/23 (from the  past 24 hour(s))   UA with Microscopic reflex to Culture    Specimen: Urine, Clean Catch   Result Value Ref Range    Color Urine Yellow Colorless, Straw, Light Yellow, Yellow    Appearance Urine Slightly Cloudy (A) Clear    Glucose Urine Negative Negative mg/dL    Bilirubin Urine Negative Negative    Ketones Urine Negative Negative mg/dL    Specific Gravity Urine 1.020 1.003 - 1.035    Blood Urine Large (A) Negative    pH Urine 7.0 4.7 - 8.0    Protein Albumin Urine 30 (A) Negative mg/dL    Urobilinogen Urine 2.0 Normal, 2.0 mg/dL    Nitrite Urine Negative Negative    Leukocyte Esterase Urine Small (A) Negative    Mucus Urine Present (A) None Seen /LPF    Amorphous Crystals Urine Few (A) None Seen /HPF    RBC Urine >182 (H) <=2 /HPF    WBC Urine 17 (H) <=5 /HPF    Squamous Epithelials Urine 3 (H) <=1 /HPF    Renal Tubular Epithelials Urine 1 (H) None Seen /HPF    Narrative    Urine Culture ordered based on laboratory criteria   US Renal Complete Non-Vascular    Narrative    PROCEDURE: US RENAL COMPLETE NON-VASCULAR    HISTORY:22 years Female  pregnant, left flank pain, gross hematuria..    TECHNIQUE: Targeted sonographic assessment of the kidneys and bladder  was performed.    COMPARISON:  None.    MEASUREMENTS:    Right renal length: 10.2 cm  Left renal length: 4.0 cm    RENAL FINDINGS: Cortical echogenicity is normal. There is mild  left-sided hydronephrosis. There is no right-sided hydronephrosis.        Impression    IMPRESSION:  Mild left-sided hydronephrosis.      JUAN WELLS MD         SYSTEM ID:  [id number]   US OB >14 Weeks Limited wo Fetal Measurement    Narrative    OB ULTRASOUND - Transabdominal     Clinical: 22 years Female , Transabdominal.  15 weeks gestation,  severe left flank pain..   Gestation:  1  Comparison: 12/12/2022  Presentation: Cephalic  Cardiac Activity:  Regular at 154 bpm  Movement:  Yes  Placenta: Anterior no previa Cervix:  3.4 cm in length  Amniotic Fluid: Normal MVP: 3.4  cm    No abnormal fluid collections are seen. There is no evidence of  subchorionic hemorrhage or abruption.       Impression    IMPRESSION: Single living intrauterine gestation without evidence of  complication.    JUAN WELLS MD         SYSTEM ID:  [id number]   CBC with platelets differential    Narrative    The following orders were created for panel order CBC with platelets differential.  Procedure                               Abnormality         Status                     ---------                               -----------         ------                     CBC with platelets and d...[[id number]]                      Final result                 Please view results for these tests on the individual orders.   Basic metabolic panel   Result Value Ref Range    Sodium 135 (L) 136 - 145 mmol/L    Potassium 3.5 3.4 - 5.3 mmol/L    Chloride 102 98 - 107 mmol/L    Carbon Dioxide (CO2) 21 (L) 22 - 29 mmol/L    Anion Gap 12 7 - 15 mmol/L    Urea Nitrogen 7.0 6.0 - 20.0 mg/dL    Creatinine 0.47 (L) 0.51 - 0.95 mg/dL    Calcium 9.3 8.6 - 10.0 mg/dL    Glucose 89 70 - 99 mg/dL    GFR Estimate >90 >60 mL/min/1.73m2   CBC with platelets and differential   Result Value Ref Range    WBC Count 8.2 4.0 - 11.0 10e3/uL    RBC Count 4.10 3.80 - 5.20 10e6/uL    Hemoglobin 12.8 11.7 - 15.7 g/dL    Hematocrit 36.8 35.0 - 47.0 %    MCV 90 78 - 100 fL    MCH 31.2 26.5 - 33.0 pg    MCHC 34.8 31.5 - 36.5 g/dL    RDW 12.5 10.0 - 15.0 %    Platelet Count 218 150 - 450 10e3/uL    % Neutrophils 81 %    % Lymphocytes 13 %    % Monocytes 4 %    % Eosinophils 2 %    % Basophils 0 %    % Immature Granulocytes 0 %    NRBCs per 100 WBC 0 <1 /100    Absolute Neutrophils 6.6 1.6 - 8.3 10e3/uL    Absolute Lymphocytes 1.1 0.8 - 5.3 10e3/uL    Absolute Monocytes 0.3 0.0 - 1.3 10e3/uL    Absolute Eosinophils 0.1 0.0 - 0.7 10e3/uL    Absolute Basophils 0.0 0.0 - 0.2 10e3/uL    Absolute Immature Granulocytes 0.0 <=0.4 10e3/uL    Absolute NRBCs 0.0  10e3/uL   Extra Tube    Narrative    The following orders were created for panel order Extra Tube.  Procedure                               Abnormality         Status                     ---------                               -----------         ------                     Extra Blue Top Tube[[id number]]                              In process                 Extra Red Top Tube[[id number]]                               In process                 Extra Green Top (Lithium...[[id number]]                      In process                   Please view results for these tests on the individual orders.       Medications   acetaminophen (TYLENOL) tablet 975 mg (975 mg Oral Given 1/18/23 1642)   ketorolac (TORADOL) injection 30 mg (30 mg Intramuscular Not Given 1/18/23 1742)   cephALEXin (KEFLEX) capsule 500 mg (500 mg Oral Given 1/18/23 1750)       Assessments & Plan (with Medical Decision Making)   This is a pleasant and talkative 22-year-old female who presented to the emergency department for evaluation of abrupt onset of left flank discomfort along with subsequent hematuria.  Symptoms improved very nicely with simple Tylenol.  Subjective and objective findings are most consistent with a likely ureteral stone.  However, we discussed that this was not confirmed with 100% certainty.  Consultation with on-call urologist at [facility] Dr. Cowan as well as OB/GYN Dr. Zimmerman.  Plan will be for close outpatient follow-up and coverage with Keflex.  Urine culture is pending.  She looks well.  She not tachycardic or febrile.  Ultrasound shows no concerning findings with the fetus.  Patient voiced complete understanding was agreeable.  Urology consult was placed.  Strict return precautions including fevers, intractable pain, vomiting, or other questions or concerns.      This document was prepared using a combination of typing and voice generated software.  While every attempt was made for accuracy, spelling and grammatical errors may  exist.    I have reviewed the nursing notes.    I have reviewed the findings, diagnosis, plan and need for follow up with the patient.               New Prescriptions    CEPHALEXIN (KEFLEX) 250 MG/5ML SUSPENSION    Take 10 mLs (500 mg) by mouth 3 times daily for 7 days       Final diagnoses:   14 weeks gestation of pregnancy   Gross hematuria   Flank pain       1/18/2023   [facility]     Nii Priest PA-C  01/18/23 [id number]

## 2023-01-18 NOTE — ED TRIAGE NOTES
"Patient presents with c/o \"severe back pain on left side.\" Patient denies any injury. Patient had urine tested Thursday,  told her no UTI. Patients is 14 weeks pregnant.       "

## 2023-01-18 NOTE — ED NOTES
Patient presents with left sided flank pain that radiates around to her groin.  Urine sample provided is cloudy with small blood clots, no odor.   Denies fever, chills, chest pain, SOB.  14 weeks pregnant, concerned she is bleeding.

## 2023-01-18 NOTE — DISCHARGE INSTRUCTIONS
We believe your symptoms are likely secondary to a kidney stone.  However, this has not been diagnosed with 100% confidence as we cannot visualize the stone.  It is very important that you have close follow-up with urology.  I have placed a urology referral and they should call you tomorrow for scheduled visit.  Rest and stay hydrated.  Tylenol as needed for pain.  Start Keflex as prescribed.  Please return to this emergency department for any new or worsening symptoms including fevers, intractable pain, or other concerns.

## 2023-01-19 LAB — BACTERIA UR CULT: NORMAL

## 2023-01-19 NOTE — ED NOTES
Discharge instructions given to patient, verbalizes understanding.   Encouraged to return if pain worsens or fever developes.  No questions at this time.     Ambulated out of ED.

## 2023-01-20 LAB — SCANNED LAB RESULT: NORMAL

## 2023-01-23 ENCOUNTER — OFFICE VISIT (OUTPATIENT)
Dept: UROLOGY | Facility: OTHER | Age: 23
End: 2023-01-23
Attending: PHYSICIAN ASSISTANT
Payer: COMMERCIAL

## 2023-01-23 VITALS
DIASTOLIC BLOOD PRESSURE: 70 MMHG | WEIGHT: 106.8 LBS | HEART RATE: 84 BPM | BODY MASS INDEX: 21.57 KG/M2 | SYSTOLIC BLOOD PRESSURE: 118 MMHG | RESPIRATION RATE: 16 BRPM | OXYGEN SATURATION: 100 %

## 2023-01-23 DIAGNOSIS — R10.9 FLANK PAIN: ICD-10-CM

## 2023-01-23 DIAGNOSIS — R31.0 GROSS HEMATURIA: ICD-10-CM

## 2023-01-23 DIAGNOSIS — R10.9 LEFT FLANK PAIN: Primary | ICD-10-CM

## 2023-01-23 PROCEDURE — G0463 HOSPITAL OUTPT CLINIC VISIT: HCPCS

## 2023-01-23 PROCEDURE — 99204 OFFICE O/P NEW MOD 45 MIN: CPT | Performed by: UROLOGY

## 2023-01-23 RX ORDER — HYDROCODONE BITARTRATE AND ACETAMINOPHEN 5; 325 MG/1; MG/1
1 TABLET ORAL
Status: ON HOLD | COMMUNITY
Start: 2023-01-21 | End: 2023-01-24

## 2023-01-23 RX ORDER — CEFTRIAXONE SODIUM 2 G/50ML
2 INJECTION, SOLUTION INTRAVENOUS
Status: CANCELLED | OUTPATIENT
Start: 2023-01-23

## 2023-01-23 RX ORDER — HYDROCODONE BITARTRATE AND ACETAMINOPHEN 5; 325 MG/1; MG/1
1-2 TABLET ORAL EVERY 4 HOURS PRN
Status: CANCELLED | OUTPATIENT
Start: 2023-01-23

## 2023-01-23 RX ORDER — PROMETHAZINE HYDROCHLORIDE 25 MG/1
25 TABLET ORAL
COMMUNITY
Start: 2023-01-21 | End: 2023-02-02

## 2023-01-23 ASSESSMENT — PAIN SCALES - GENERAL: PAINLEVEL: NO PAIN (0)

## 2023-01-23 NOTE — H&P (VIEW-ONLY)
I was asked to see this patient by NAINA Ashton and provide my opinion about the following:   Left hydronephrosis and flank pain    Type of Visit  Consult    Chief Complaint  Left hydronephrosis and flank pain    HPI  Ms. Gonzales is a 22 year old female 15 weeks pregnant who presents with left hydronephrosis and flank pain.  LAURITA: 7/15/2023    She initially presented to the ED 5 days ago.  At that time the patient underwent a renal ultrasound which revealed left hydronephrosis and flank pain.  Concern at this time is a possible obstructing stone.    The patient currently denies fevers or chills.  The patient currently denies nausea or vomiting.  She has not undergone surgery in the past for stones.  Her father has recurring kidney stones he has been managing throughout adulthood.    The patient does not have recent or remote history of a CT scan on the chart for comparison.  She did undergo a renal ultrasound at the end of 2021 which did suggest a 6 mm left-sided nonobstructing stone.      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  Patient Active Problem List   Diagnosis     Adjustment disorder with depressed mood     PTSD (post-traumatic stress disorder)     Suicidal behavior     Adjustment disorder with mixed disturbance of emotions and conduct     Anxiety     Astigmatism     PCOS (polycystic ovarian syndrome)     Pregnancy, supervision of first, first trimester     Nausea and vomiting during pregnancy     Past Surgical History  She  has a past surgical history that includes [place] Tooth  Extraction (Bilateral, 2021).    Medications  She has a current medication list which includes the following prescription(s): hydrocodone-acetaminophen, promethazine, albuterol, cephalexin, escitalopram, metformin, ondansetron, phenazopyridine, and prenatal gummies.    Allergies  Allergies   Allergen Reactions     Apple      Gums/mouth gets itchy     Cherry      Gums/mouth gets itchy     Pistachios [Nuts]      Social History  She  reports that she has never smoked. She has never used smokeless tobacco. She reports that she does not drink alcohol and does not use drugs.  No drug abuse.    Family History  Family History   Problem Relation Age of Onset     Mental Illness Mother      Mental Illness Father      Review of Systems  I personally reviewed the ROS with the patient.    Nursing Notes:   Belen Huston LPN  1/23/2023  1:42 PM  Addendum  Patient presents to the clinic today for a consult for flank pain / Hematuria    Review of Systems:    Weight loss:    No     Recent fever/chills:  No   Night sweats:   No  Current skin rash:  No   Recent hair loss:  No  Heat intolerance:  Yes   Cold intolerance:  No  Chest pain:   No   Palpitations:   No  Shortness of breath:  No   Wheezing:   No  Constipation:    Yes   Diarrhea:   No   Nausea:   Yes   Vomiting:   No   Kidney/side pain:  Yes   Back pain:   Yes  Frequent headaches:  No   Dizziness:     No  Leg swelling:   No   Calf pain:    No    Parents, brothers or sisters with history of kidney cancer:   No  Parents, brothers or sisters with history of bladder cancer: No  Father or brother with history of prostate cancer:  No      Physical Exam  Vitals:    01/23/23 1346   BP: 118/70   BP Location: Right arm   Patient Position: Sitting   Cuff Size: Adult Regular   Pulse: 84   Resp: 16   SpO2: 100%   Weight: 48.4 kg (106 lb 12.8 oz)   Constitutional: NAD, WDWN  Cardiovascular: Regular rate and rhythm  Pulmonary/Chest: Respirations are even and non-labored bilaterally.   CTAB  Abdominal: Soft with no distension, tenderness, masses, guarding.  - left CVA tenderness    - right CVA tenderness  Neurological: A + O x 3,  cranial nerves II-XII grossly intact  Extremities: MEREDITH x 4, warm, no clubbing, no cyanosis  Skin: Pink, warm, dry with no rash  Psychiatric:  Normal mood and affect  Genitourinary: Nonpalpable bladder    Labs   01/18/23 15:27 01/18/23 16:54   Sodium  135 (L)   Potassium  3.5   Chloride  102   Carbon Dioxide (CO2)  21 (L)   Urea Nitrogen  7.0   Creatinine  0.47 (L)   GFR Estimate  >90   Calcium  9.3   Anion Gap  12   Glucose  89   WBC  8.2   Hemoglobin  12.8   Hematocrit  36.8   Platelet Count  218   RBC Count  4.10   MCV  90   MCH  31.2   MCHC  34.8   RDW  12.5   % Neutrophils  81   % Lymphocytes  13   % Monocytes  4   % Eosinophils  2   % Basophils  0   Absolute Basophils  0.0   Absolute Eosinophils  0.1   Absolute Immature Granulocytes  0.0   Absolute Lymphocytes  1.1   Absolute Monocytes  0.3   % Immature Granulocytes  0   Absolute Neutrophils  6.6   Absolute NRBCs  0.0   NRBCs per 100 WBC  0   Color Urine Yellow    Appearance Urine Slightly Cloudy !    Glucose Urine Negative    Bilirubin Urine Negative    Ketones Urine Negative    Specific Gravity Urine 1.020    pH Urine 7.0    Protein Albumin Urine 30 !    Urobilinogen mg/dL 2.0    Nitrite Urine Negative    Blood Urine Large !    Leukocyte Esterase Urine Small !    WBC Urine 17 (H)    RBC Urine >182 (H)    Squamous Epithelial /HPF Urine 3 (H)    Renal Tub Epi 1 (H)    Mucus Urine Present !    Amorphous Crystals Few !    UCx     Mixed Urogenital Jacey     Imaging  I personally reviewed and interpreted the images and report.  Renal Ultrasound  1/18/2023  Narrative & Impression   PROCEDURE: US RENAL COMPLETE NON-VASCULAR     HISTORY:22 years Female  pregnant, left flank pain, gross hematuria..     TECHNIQUE: Targeted sonographic assessment of the kidneys and bladder  was performed.     COMPARISON:   None.     MEASUREMENTS:     Right renal length: 10.2 cm  Left renal length: 4.0 cm     RENAL FINDINGS: Cortical echogenicity is normal. There is mild  left-sided hydronephrosis. There is no right-sided hydronephrosis.    IMPRESSION:  Mild left-sided hydronephrosis.       Assessment & Plan  Ms. Gonzales is a 22 year old female 15 weeks pregnant who presents with left hydronephrosis and flank pain clinically consistent with a left-sided obstructing stone.    We discussed that the hydronephrosis may or may not reflect obstruction from a ureteral stone.  Given how early she is in her pregnancy a stone is significantly more likely than obstructed from a gravid uterus.    We discussed that CT scans are the ideal imaging study to evaluate for stones however given she is pregnant this would result in radiation exposure to her unborn child.  Because of this we will defer CT imaging.    We discussed options for the presumed obstructing stone:  Observation, ureteral stent and nephrostomy tube.  Discussed the risks and benefits of all options for the patient and her unborn baby.    We discussed that she likely has an obstructing left-sided stone given the circumstantial evidence all listed above including an ultrasound performed over a year ago revealing a left-sided stone, left hydronephrosis, left flank pain with gross hematuria.    Discussed the treatment options for the ureteral stone including trial of passage and ureteroscopy with laser lithotripsy.    After explaining the risks, benefits, and alternatives a decision was made to proceed with ureteroscopy/laser lithotripsy.    The patient was explained the specific risks of bleeding, pain, infection, and ureteral injury.    In addition, the patient was told a stent may need to be placed which could result in urinary frequency, urgency, dysuria, and flank pain with voiding.    It would require an office based procedure to remove it at a later date.    Finally, the patient  "was told if the stone was very impacted, that we would place a stent and return at a later date to treat the stone.      Finally we discussed radiation exposure to her unborn child.  I explained that I will limit the radiation to the absolute lowest possible amount using ALARA approach to radiation.    Plan  The patient was scheduled and consented for \"left ureteroscopy with holmium laser lithotripsy and stent placement\" in the OR (LMA general anesthesia).  "

## 2023-01-23 NOTE — NURSING NOTE
Patient presents to the clinic today for a consult for flank pain / Hematuria    Review of Systems:    Weight loss:    No     Recent fever/chills:  No   Night sweats:   No  Current skin rash:  No   Recent hair loss:  No  Heat intolerance:  Yes   Cold intolerance:  No  Chest pain:   No   Palpitations:   No  Shortness of breath:  No   Wheezing:   No  Constipation:    Yes   Diarrhea:   No   Nausea:   Yes   Vomiting:   No   Kidney/side pain:  Yes   Back pain:   Yes  Frequent headaches:  No   Dizziness:     No  Leg swelling:   No   Calf pain:    No    Parents, brothers or sisters with history of kidney cancer:   No  Parents, brothers or sisters with history of bladder cancer: No  Father or brother with history of prostate cancer:  No    Post-Void Residual  A post-void residual was measured by ultrasonic bladder scanner.  >74 mL  Taylor Barclay LPN  1/23/2023 1:50 PM

## 2023-01-23 NOTE — PROGRESS NOTES
I was asked to see this patient by NAINA Ashton and provide my opinion about the following:   Left hydronephrosis and flank pain    Type of Visit  Consult    Chief Complaint  Left hydronephrosis and flank pain    HPI  Ms. Gonzales is a 22 year old female 15 weeks pregnant who presents with left hydronephrosis and flank pain.  LAURITA: 7/15/2023    She initially presented to the ED 5 days ago.  At that time the patient underwent a renal ultrasound which revealed left hydronephrosis and flank pain.  Concern at this time is a possible obstructing stone.    The patient currently denies fevers or chills.  The patient currently denies nausea or vomiting.  She has not undergone surgery in the past for stones.  Her father has recurring kidney stones he has been managing throughout adulthood.    The patient does not have recent or remote history of a CT scan on the chart for comparison.  She did undergo a renal ultrasound at the end of 2021 which did suggest a 6 mm left-sided nonobstructing stone.      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  Patient Active Problem List   Diagnosis     Adjustment disorder with depressed mood     PTSD (post-traumatic stress disorder)     Suicidal behavior     Adjustment disorder with mixed disturbance of emotions and conduct     Anxiety     Astigmatism     PCOS (polycystic ovarian syndrome)     Pregnancy, supervision of first, first trimester     Nausea and vomiting during pregnancy     Past Surgical History  She  has a past surgical history that includes [place] Tooth  Extraction (Bilateral, 2021).    Medications  She has a current medication list which includes the following prescription(s): hydrocodone-acetaminophen, promethazine, albuterol, cephalexin, escitalopram, metformin, ondansetron, phenazopyridine, and prenatal gummies.    Allergies  Allergies   Allergen Reactions     Apple      Gums/mouth gets itchy     Cherry      Gums/mouth gets itchy     Pistachios [Nuts]      Social History  She  reports that she has never smoked. She has never used smokeless tobacco. She reports that she does not drink alcohol and does not use drugs.  No drug abuse.    Family History  Family History   Problem Relation Age of Onset     Mental Illness Mother      Mental Illness Father      Review of Systems  I personally reviewed the ROS with the patient.    Nursing Notes:   Belen Huston LPN  1/23/2023  1:42 PM  Addendum  Patient presents to the clinic today for a consult for flank pain / Hematuria    Review of Systems:    Weight loss:    No     Recent fever/chills:  No   Night sweats:   No  Current skin rash:  No   Recent hair loss:  No  Heat intolerance:  Yes   Cold intolerance:  No  Chest pain:   No   Palpitations:   No  Shortness of breath:  No   Wheezing:   No  Constipation:    Yes   Diarrhea:   No   Nausea:   Yes   Vomiting:   No   Kidney/side pain:  Yes   Back pain:   Yes  Frequent headaches:  No   Dizziness:     No  Leg swelling:   No   Calf pain:    No    Parents, brothers or sisters with history of kidney cancer:   No  Parents, brothers or sisters with history of bladder cancer: No  Father or brother with history of prostate cancer:  No      Physical Exam  Vitals:    01/23/23 1346   BP: 118/70   BP Location: Right arm   Patient Position: Sitting   Cuff Size: Adult Regular   Pulse: 84   Resp: 16   SpO2: 100%   Weight: 48.4 kg (106 lb 12.8 oz)   Constitutional: NAD, WDWN  Cardiovascular: Regular rate and rhythm  Pulmonary/Chest: Respirations are even and non-labored bilaterally.   CTAB  Abdominal: Soft with no distension, tenderness, masses, guarding.  - left CVA tenderness    - right CVA tenderness  Neurological: A + O x 3,  cranial nerves II-XII grossly intact  Extremities: MEREDITH x 4, warm, no clubbing, no cyanosis  Skin: Pink, warm, dry with no rash  Psychiatric:  Normal mood and affect  Genitourinary: Nonpalpable bladder    Labs   01/18/23 15:27 01/18/23 16:54   Sodium  135 (L)   Potassium  3.5   Chloride  102   Carbon Dioxide (CO2)  21 (L)   Urea Nitrogen  7.0   Creatinine  0.47 (L)   GFR Estimate  >90   Calcium  9.3   Anion Gap  12   Glucose  89   WBC  8.2   Hemoglobin  12.8   Hematocrit  36.8   Platelet Count  218   RBC Count  4.10   MCV  90   MCH  31.2   MCHC  34.8   RDW  12.5   % Neutrophils  81   % Lymphocytes  13   % Monocytes  4   % Eosinophils  2   % Basophils  0   Absolute Basophils  0.0   Absolute Eosinophils  0.1   Absolute Immature Granulocytes  0.0   Absolute Lymphocytes  1.1   Absolute Monocytes  0.3   % Immature Granulocytes  0   Absolute Neutrophils  6.6   Absolute NRBCs  0.0   NRBCs per 100 WBC  0   Color Urine Yellow    Appearance Urine Slightly Cloudy !    Glucose Urine Negative    Bilirubin Urine Negative    Ketones Urine Negative    Specific Gravity Urine 1.020    pH Urine 7.0    Protein Albumin Urine 30 !    Urobilinogen mg/dL 2.0    Nitrite Urine Negative    Blood Urine Large !    Leukocyte Esterase Urine Small !    WBC Urine 17 (H)    RBC Urine >182 (H)    Squamous Epithelial /HPF Urine 3 (H)    Renal Tub Epi 1 (H)    Mucus Urine Present !    Amorphous Crystals Few !    UCx     Mixed Urogenital Jacey     Imaging  I personally reviewed and interpreted the images and report.  Renal Ultrasound  1/18/2023  Narrative & Impression   PROCEDURE: US RENAL COMPLETE NON-VASCULAR     HISTORY:22 years Female  pregnant, left flank pain, gross hematuria..     TECHNIQUE: Targeted sonographic assessment of the kidneys and bladder  was performed.     COMPARISON:   None.     MEASUREMENTS:     Right renal length: 10.2 cm  Left renal length: 4.0 cm     RENAL FINDINGS: Cortical echogenicity is normal. There is mild  left-sided hydronephrosis. There is no right-sided hydronephrosis.    IMPRESSION:  Mild left-sided hydronephrosis.       Assessment & Plan  Ms. Gonzales is a 22 year old female 15 weeks pregnant who presents with left hydronephrosis and flank pain clinically consistent with a left-sided obstructing stone.    We discussed that the hydronephrosis may or may not reflect obstruction from a ureteral stone.  Given how early she is in her pregnancy a stone is significantly more likely than obstructed from a gravid uterus.    We discussed that CT scans are the ideal imaging study to evaluate for stones however given she is pregnant this would result in radiation exposure to her unborn child.  Because of this we will defer CT imaging.    We discussed options for the presumed obstructing stone:  Observation, ureteral stent and nephrostomy tube.  Discussed the risks and benefits of all options for the patient and her unborn baby.    We discussed that she likely has an obstructing left-sided stone given the circumstantial evidence all listed above including an ultrasound performed over a year ago revealing a left-sided stone, left hydronephrosis, left flank pain with gross hematuria.    Discussed the treatment options for the ureteral stone including trial of passage and ureteroscopy with laser lithotripsy.    After explaining the risks, benefits, and alternatives a decision was made to proceed with ureteroscopy/laser lithotripsy.    The patient was explained the specific risks of bleeding, pain, infection, and ureteral injury.    In addition, the patient was told a stent may need to be placed which could result in urinary frequency, urgency, dysuria, and flank pain with voiding.    It would require an office based procedure to remove it at a later date.    Finally, the patient  "was told if the stone was very impacted, that we would place a stent and return at a later date to treat the stone.      Finally we discussed radiation exposure to her unborn child.  I explained that I will limit the radiation to the absolute lowest possible amount using ALARA approach to radiation.    Plan  The patient was scheduled and consented for \"left ureteroscopy with holmium laser lithotripsy and stent placement\" in the OR (LMA general anesthesia).  "

## 2023-01-24 ENCOUNTER — ANESTHESIA EVENT (OUTPATIENT)
Dept: SURGERY | Facility: OTHER | Age: 23
End: 2023-01-24
Payer: COMMERCIAL

## 2023-01-24 ENCOUNTER — ANESTHESIA (OUTPATIENT)
Dept: SURGERY | Facility: OTHER | Age: 23
End: 2023-01-24
Payer: COMMERCIAL

## 2023-01-24 ENCOUNTER — HOSPITAL ENCOUNTER (OUTPATIENT)
Dept: GENERAL RADIOLOGY | Facility: OTHER | Age: 23
Discharge: HOME | End: 2023-01-24
Attending: UROLOGY | Admitting: UROLOGY
Payer: COMMERCIAL

## 2023-01-24 ENCOUNTER — HOSPITAL ENCOUNTER (OUTPATIENT)
Facility: OTHER | Age: 23
Discharge: HOME | End: 2023-01-24
Attending: UROLOGY | Admitting: UROLOGY
Payer: COMMERCIAL

## 2023-01-24 VITALS
WEIGHT: 106 LBS | TEMPERATURE: 98.3 F | HEART RATE: 88 BPM | OXYGEN SATURATION: 98 % | SYSTOLIC BLOOD PRESSURE: 92 MMHG | RESPIRATION RATE: 16 BRPM | BODY MASS INDEX: 21.41 KG/M2 | DIASTOLIC BLOOD PRESSURE: 66 MMHG

## 2023-01-24 DIAGNOSIS — N20.1 LEFT URETERAL STONE: Primary | ICD-10-CM

## 2023-01-24 DIAGNOSIS — Z98.890 H/O URETEROSCOPY: ICD-10-CM

## 2023-01-24 LAB — GLUCOSE BLDC GLUCOMTR-MCNC: 76 MG/DL (ref 70–99)

## 2023-01-24 PROCEDURE — 250N000011 HC RX IP 250 OP 636: Performed by: UROLOGY

## 2023-01-24 PROCEDURE — 710N000010 HC RECOVERY PHASE 1, LEVEL 2, PER MIN: Performed by: UROLOGY

## 2023-01-24 PROCEDURE — C1758 CATHETER, URETERAL: HCPCS | Performed by: UROLOGY

## 2023-01-24 PROCEDURE — 272N000001 HC OR GENERAL SUPPLY STERILE: Performed by: UROLOGY

## 2023-01-24 PROCEDURE — 250N000011 HC RX IP 250 OP 636: Performed by: NURSE ANESTHETIST, CERTIFIED REGISTERED

## 2023-01-24 PROCEDURE — C2617 STENT, NON-COR, TEM W/O DEL: HCPCS | Performed by: UROLOGY

## 2023-01-24 PROCEDURE — 52356 CYSTO/URETERO W/LITHOTRIPSY: CPT | Performed by: UROLOGY

## 2023-01-24 PROCEDURE — 52356 CYSTO/URETERO W/LITHOTRIPSY: CPT | Performed by: NURSE ANESTHETIST, CERTIFIED REGISTERED

## 2023-01-24 PROCEDURE — 272N000002 HC OR SUPPLY OTHER OPNP: Performed by: UROLOGY

## 2023-01-24 PROCEDURE — 82962 GLUCOSE BLOOD TEST: CPT

## 2023-01-24 PROCEDURE — C1769 GUIDE WIRE: HCPCS | Performed by: UROLOGY

## 2023-01-24 PROCEDURE — C1894 INTRO/SHEATH, NON-LASER: HCPCS | Performed by: UROLOGY

## 2023-01-24 PROCEDURE — 250N000013 HC RX MED GY IP 250 OP 250 PS 637: Performed by: UROLOGY

## 2023-01-24 PROCEDURE — 360N000083 HC SURGERY LEVEL 3 W/ FLUORO, PER MIN: Performed by: UROLOGY

## 2023-01-24 PROCEDURE — 999N000141 HC STATISTIC PRE-PROCEDURE NURSING ASSESSMENT: Performed by: UROLOGY

## 2023-01-24 PROCEDURE — 258N000001 HC RX 258: Performed by: UROLOGY

## 2023-01-24 PROCEDURE — 74420 UROGRAPHY RTRGR +-KUB: CPT | Mod: 26 | Performed by: UROLOGY

## 2023-01-24 PROCEDURE — 999N000180 XR SURGERY CARM FLUORO LESS THAN 5 MIN: Mod: TC

## 2023-01-24 PROCEDURE — 370N000017 HC ANESTHESIA TECHNICAL FEE, PER MIN: Performed by: UROLOGY

## 2023-01-24 PROCEDURE — 258N000003 HC RX IP 258 OP 636: Performed by: NURSE ANESTHETIST, CERTIFIED REGISTERED

## 2023-01-24 PROCEDURE — 82365 CALCULUS SPECTROSCOPY: CPT | Performed by: UROLOGY

## 2023-01-24 PROCEDURE — 710N000012 HC RECOVERY PHASE 2, PER MINUTE: Performed by: UROLOGY

## 2023-01-24 DEVICE — URETERAL STENT
Type: IMPLANTABLE DEVICE | Status: FUNCTIONAL
Brand: CONTOUR™

## 2023-01-24 RX ORDER — PROPOFOL 10 MG/ML
INJECTION, EMULSION INTRAVENOUS CONTINUOUS PRN
Status: DISCONTINUED | OUTPATIENT
Start: 2023-01-24 | End: 2023-01-24

## 2023-01-24 RX ORDER — ONDANSETRON 2 MG/ML
4 INJECTION INTRAMUSCULAR; INTRAVENOUS EVERY 30 MIN PRN
Status: DISCONTINUED | OUTPATIENT
Start: 2023-01-24 | End: 2023-01-24 | Stop reason: HOSPADM

## 2023-01-24 RX ORDER — HYDROCODONE BITARTRATE AND ACETAMINOPHEN 5; 325 MG/1; MG/1
1-2 TABLET ORAL EVERY 6 HOURS PRN
Qty: 15 TABLET | Refills: 0 | Status: SHIPPED | OUTPATIENT
Start: 2023-01-24 | End: 2023-02-02

## 2023-01-24 RX ORDER — SODIUM CHLORIDE 9 MG/ML
INJECTION, SOLUTION INTRAVENOUS CONTINUOUS
Status: DISCONTINUED | OUTPATIENT
Start: 2023-01-24 | End: 2023-01-24 | Stop reason: HOSPADM

## 2023-01-24 RX ORDER — FENTANYL CITRATE 50 UG/ML
50 INJECTION, SOLUTION INTRAMUSCULAR; INTRAVENOUS EVERY 5 MIN PRN
Status: DISCONTINUED | OUTPATIENT
Start: 2023-01-24 | End: 2023-01-24 | Stop reason: HOSPADM

## 2023-01-24 RX ORDER — CEFTRIAXONE SODIUM 2 G/50ML
2 INJECTION, SOLUTION INTRAVENOUS
Status: COMPLETED | OUTPATIENT
Start: 2023-01-24 | End: 2023-01-24

## 2023-01-24 RX ORDER — NALOXONE HYDROCHLORIDE 0.4 MG/ML
0.2 INJECTION, SOLUTION INTRAMUSCULAR; INTRAVENOUS; SUBCUTANEOUS
Status: DISCONTINUED | OUTPATIENT
Start: 2023-01-24 | End: 2023-01-24 | Stop reason: HOSPADM

## 2023-01-24 RX ORDER — HYDROCODONE BITARTRATE AND ACETAMINOPHEN 5; 325 MG/1; MG/1
1-2 TABLET ORAL EVERY 4 HOURS PRN
Status: DISCONTINUED | OUTPATIENT
Start: 2023-01-24 | End: 2023-01-24 | Stop reason: HOSPADM

## 2023-01-24 RX ORDER — FENTANYL CITRATE 50 UG/ML
25 INJECTION, SOLUTION INTRAMUSCULAR; INTRAVENOUS EVERY 5 MIN PRN
Status: DISCONTINUED | OUTPATIENT
Start: 2023-01-24 | End: 2023-01-24 | Stop reason: HOSPADM

## 2023-01-24 RX ORDER — ACETAMINOPHEN 10 MG/ML
INJECTION, SOLUTION INTRAVENOUS PRN
Status: DISCONTINUED | OUTPATIENT
Start: 2023-01-24 | End: 2023-01-24

## 2023-01-24 RX ORDER — NALOXONE HYDROCHLORIDE 0.4 MG/ML
0.4 INJECTION, SOLUTION INTRAMUSCULAR; INTRAVENOUS; SUBCUTANEOUS
Status: DISCONTINUED | OUTPATIENT
Start: 2023-01-24 | End: 2023-01-24 | Stop reason: HOSPADM

## 2023-01-24 RX ORDER — ONDANSETRON 2 MG/ML
INJECTION INTRAMUSCULAR; INTRAVENOUS PRN
Status: DISCONTINUED | OUTPATIENT
Start: 2023-01-24 | End: 2023-01-24

## 2023-01-24 RX ORDER — LIDOCAINE 40 MG/G
CREAM TOPICAL
Status: DISCONTINUED | OUTPATIENT
Start: 2023-01-24 | End: 2023-01-24 | Stop reason: HOSPADM

## 2023-01-24 RX ORDER — ONDANSETRON 4 MG/1
4 TABLET, ORALLY DISINTEGRATING ORAL EVERY 30 MIN PRN
Status: DISCONTINUED | OUTPATIENT
Start: 2023-01-24 | End: 2023-01-24 | Stop reason: HOSPADM

## 2023-01-24 RX ORDER — PROPOFOL 10 MG/ML
INJECTION, EMULSION INTRAVENOUS PRN
Status: DISCONTINUED | OUTPATIENT
Start: 2023-01-24 | End: 2023-01-24

## 2023-01-24 RX ORDER — IOPAMIDOL 755 MG/ML
INJECTION, SOLUTION INTRAVASCULAR PRN
Status: DISCONTINUED | OUTPATIENT
Start: 2023-01-24 | End: 2023-01-24 | Stop reason: HOSPADM

## 2023-01-24 RX ORDER — ACETAMINOPHEN 500 MG
500-1000 TABLET ORAL EVERY 6 HOURS PRN
Status: ON HOLD | COMMUNITY
End: 2023-07-10

## 2023-01-24 RX ORDER — FENTANYL CITRATE 50 UG/ML
INJECTION, SOLUTION INTRAMUSCULAR; INTRAVENOUS PRN
Status: DISCONTINUED | OUTPATIENT
Start: 2023-01-24 | End: 2023-01-24

## 2023-01-24 RX ADMIN — HYDROCODONE BITARTRATE AND ACETAMINOPHEN 1 TABLET: 5; 325 TABLET ORAL at 15:22

## 2023-01-24 RX ADMIN — SODIUM CHLORIDE: 9 INJECTION, SOLUTION INTRAVENOUS at 14:44

## 2023-01-24 RX ADMIN — ACETAMINOPHEN 1000 MG: 10 INJECTION, SOLUTION INTRAVENOUS at 14:06

## 2023-01-24 RX ADMIN — CEFTRIAXONE SODIUM 2 G: 2 INJECTION, SOLUTION INTRAVENOUS at 13:41

## 2023-01-24 RX ADMIN — SODIUM CHLORIDE: 9 INJECTION, SOLUTION INTRAVENOUS at 11:35

## 2023-01-24 RX ADMIN — FENTANYL CITRATE 50 MCG: 50 INJECTION, SOLUTION INTRAMUSCULAR; INTRAVENOUS at 15:53

## 2023-01-24 RX ADMIN — FENTANYL CITRATE 25 MCG: 50 INJECTION, SOLUTION INTRAMUSCULAR; INTRAVENOUS at 14:11

## 2023-01-24 RX ADMIN — FENTANYL CITRATE 25 MCG: 50 INJECTION, SOLUTION INTRAMUSCULAR; INTRAVENOUS at 14:21

## 2023-01-24 RX ADMIN — PROPOFOL 200 MG: 10 INJECTION, EMULSION INTRAVENOUS at 14:06

## 2023-01-24 RX ADMIN — FENTANYL CITRATE 25 MCG: 50 INJECTION, SOLUTION INTRAMUSCULAR; INTRAVENOUS at 14:13

## 2023-01-24 RX ADMIN — PROPOFOL 200 MCG/KG/MIN: 10 INJECTION, EMULSION INTRAVENOUS at 14:06

## 2023-01-24 RX ADMIN — PROPOFOL 30 MG: 10 INJECTION, EMULSION INTRAVENOUS at 14:11

## 2023-01-24 RX ADMIN — ONDANSETRON HYDROCHLORIDE 4 MG: 2 SOLUTION INTRAMUSCULAR; INTRAVENOUS at 14:06

## 2023-01-24 RX ADMIN — PROPOFOL 50 MG: 10 INJECTION, EMULSION INTRAVENOUS at 14:13

## 2023-01-24 RX ADMIN — FENTANYL CITRATE 25 MCG: 50 INJECTION, SOLUTION INTRAMUSCULAR; INTRAVENOUS at 14:08

## 2023-01-24 ASSESSMENT — ACTIVITIES OF DAILY LIVING (ADL)
ADLS_ACUITY_SCORE: 35
ADLS_ACUITY_SCORE: 33
ADLS_ACUITY_SCORE: 35

## 2023-01-24 NOTE — INTERVAL H&P NOTE
I have reviewed the surgical (or preoperative) H&P that is linked to this encounter, and examined the patient. There are no significant changes    Clinical Conditions Present on Arrival:  Clinically Significant Risk Factors Present on Admission           # Hyponatremia: Lowest Na = 135 mmol/L in last 30 days, will monitor as appropriate

## 2023-01-24 NOTE — ANESTHESIA PROCEDURE NOTES
Airway       Patient location during procedure: OR       Procedure Start/Stop Times: 1/24/2023 2:00 PM  Staff -        CRNA: Mary Carmen Montano APRN CRNA       Performed By: CRNA  Consent for Airway        Urgency: elective  Indications and Patient Condition       Indications for airway management: amilcar-procedural       Induction type:intravenous       Mask difficulty assessment: 0 - not attempted    Final Airway Details       Final airway type: supraglottic airway    Supraglottic Airway Details        Type: LMA       Brand: I-Gel       LMA size: 3    Post intubation assessment        Placement verified by: capnometry and chest rise        Number of attempts at approach: 1       Secured with: silk tape       Ease of procedure: easy       Dentition: Intact

## 2023-01-24 NOTE — ANESTHESIA PREPROCEDURE EVALUATION
Anesthesia Pre-Procedure Evaluation    Patient: Trinh Gonzales   MRN: [id number] : 2000        Procedure : Procedure(s):  Left ureteroscopy with laser lithotripsy and stent placement          Past Medical History:   Diagnosis Date     Adjustment disorder with depressed mood 2018     Adjustment disorder with mixed disturbance of emotions and conduct 2016     Anxiety 2019     Astigmatism 2010    Formatting of this note might be different from the original. IMO Update     Irregular menses 2022     PCOS (polycystic ovarian syndrome) 2022     Primary oligomenorrhea 2022     PTSD (post-traumatic stress disorder) 2018     Suicidal behavior 2016     [place]larche, premature 2008    Overview:  Asymmetric.  Slight nipple enlargement on left side.  Right side has breast tissue which is probably 3-4 cm. IMO Update 10/11  Formatting of this note might be different from the original. Asymmetric.  Slight nipple enlargement on left side.  Right side has breast tissue which is probably 3-4 cm. IMO Update 10/11      Past Surgical History:   Procedure Laterality Date     [place] TOOTH EXTRACTION Bilateral       Allergies   Allergen Reactions     Apple      Gums/mouth gets itchy     Cherry      Gums/mouth gets itchy     Pistachios [Nuts]       Social History     Tobacco Use     Smoking status: Never     Smokeless tobacco: Never   Substance Use Topics     Alcohol use: No      Wt Readings from Last 1 Encounters:   23 48.4 kg (106 lb 12.8 oz)        Anesthesia Evaluation   Pt has not had prior anesthetic         ROS/MED HX  ENT/Pulmonary:  - neg pulmonary ROS     Neurologic:  - neg neurologic ROS     Cardiovascular:  - neg cardiovascular ROS     METS/Exercise Tolerance: >4 METS    Hematologic:  - neg hematologic  ROS     Musculoskeletal:  - neg musculoskeletal ROS     GI/Hepatic:     (+) GERD, Asymptomatic on medication,     Renal/Genitourinary:  - neg Renal ROS     Endo:   - neg endo ROS     Psychiatric/Substance Use:     (+) psychiatric history anxiety     Infectious Disease:  - neg infectious disease ROS     Malignancy:  - neg malignancy ROS     Other: Comment: Pt is 15 weeks pregnant.  She understands the risks of GA on pregnancy and wishes to proceed.     (+) Possibly pregnant, ,         Physical Exam    Airway  airway exam normal      Mallampati: II   TM distance: > 3 FB   Neck ROM: full   Mouth opening: > 3 cm    Respiratory Devices and Support         Dental       (+) Completely normal teeth      Cardiovascular   cardiovascular exam normal       Rhythm and rate: regular and normal     Pulmonary   pulmonary exam normal        breath sounds clear to auscultation           OUTSIDE LABS:  CBC:   Lab Results   Component Value Date    WBC 8.2 01/18/2023    WBC 5.4 01/12/2023    HGB 12.8 01/18/2023    HGB 12.3 01/12/2023    HCT 36.8 01/18/2023    HCT 35.7 01/12/2023     01/18/2023     01/12/2023     BMP:   Lab Results   Component Value Date     (L) 01/18/2023     08/06/2020    POTASSIUM 3.5 01/18/2023    POTASSIUM 3.8 08/06/2020    CHLORIDE 102 01/18/2023    CHLORIDE 109 08/06/2020    CO2 21 (L) 01/18/2023    CO2 26 08/06/2020    BUN 7.0 01/18/2023    BUN 10 08/06/2020    CR 0.47 (L) 01/18/2023    CR 0.68 08/06/2020    GLC 89 01/18/2023    GLC 83 08/06/2020     COAGS: No results found for: PTT, INR, FIBR  POC:   Lab Results   Component Value Date    HCG Negative 09/02/2021    HCGS Negative 06/15/2021     HEPATIC:   Lab Results   Component Value Date    ALBUMIN 4.2 08/06/2020    PROTTOTAL 7.7 08/06/2020    ALT 25 08/06/2020    AST 19 08/06/2020    ALKPHOS 72 08/06/2020    BILITOTAL 1.4 (H) 08/06/2020     OTHER:   Lab Results   Component Value Date    CLARITZA 9.3 01/18/2023    TSH 1.17 01/12/2023    T4 0.90 06/15/2021       Anesthesia Plan    ASA Status:  2   NPO Status:  NPO Appropriate    Anesthesia Type: General.     - Airway: LMA   Induction: Propofol.    Maintenance: Balanced.        Consents    Anesthesia Plan(s) and associated risks, benefits, and realistic alternatives discussed. Questions answered and patient/representative(s) expressed understanding.     - Discussed: Risks, Benefits and Alternatives for BOTH SEDATION and the PROCEDURE were discussed     - Discussed with:  Patient      - Extended Intubation/Ventilatory Support Discussed: No.      - Patient is DNR/DNI Status: No    Use of blood products discussed: Yes.     - Discussed with: Patient.     - Consented: consented to blood products            Reason for refusal: other.     Postoperative Care    Pain management: IV analgesics, Multi-modal analgesia.   PONV prophylaxis: Ondansetron (or other 5HT-3), Dexamethasone or Solumedrol     Comments:                RONEL PINO CRNA

## 2023-01-24 NOTE — ANESTHESIA POSTPROCEDURE EVALUATION
Patient: Trinh Gonzales    Procedure: Procedure(s):  Left ureteroscopy with laser lithotripsy and stent placement       Anesthesia Type:  General    Note:  Disposition: Outpatient   Postop Pain Control: Uneventful            Sign Out: Well controlled pain   PONV: No   Neuro/Psych: Uneventful            Sign Out: Acceptable/Baseline neuro status   Airway/Respiratory: Uneventful            Sign Out: Acceptable/Baseline resp. status   CV/Hemodynamics: Uneventful            Sign Out: Acceptable CV status; No obvious hypovolemia; No obvious fluid overload   Other NRE: NONE   DID A NON-ROUTINE EVENT OCCUR? No           Last vitals:  Vitals Value Taken Time   /56 01/24/23 1505   Temp 98.3  F (36.8  C) 01/24/23 1505   Pulse 70 01/24/23 1507   Resp 10 01/24/23 1508   SpO2 100 % 01/24/23 1507   Vitals shown include unvalidated device data.    Electronically Signed By: RONEL PINO CRNA  January 24, 2023  3:10 PM

## 2023-01-24 NOTE — ANESTHESIA CARE TRANSFER NOTE
Patient: Trinh Gonzales    Procedure: Procedure(s):  Left ureteroscopy with laser lithotripsy and stent placement       Diagnosis: Left flank pain [R10.9]  Flank pain [R10.9]  Gross hematuria [R31.0]  Diagnosis Additional Information: No value filed.    Anesthesia Type:   General     Note:    Oropharynx: oropharynx clear of all foreign objects  Level of Consciousness: awake  Oxygen Supplementation: face mask  Level of Supplemental Oxygen (L/min / FiO2): 8  Independent Airway: airway patency satisfactory and stable  Dentition: dentition unchanged  Vital Signs Stable: post-procedure vital signs reviewed and stable  Report to RN Given: handoff report given  Patient transferred to: PACU    Handoff Report: Identifed the Patient, Identified the Reponsible Provider, Reviewed the pertinent medical history, Discussed the surgical course, Reviewed Intra-OP anesthesia mangement and issues during anesthesia, Set expectations for post-procedure period and Allowed opportunity for questions and acknowledgement of understanding      Vitals:  Vitals Value Taken Time   /56 01/24/23 1505   Temp 98.3  F (36.8  C) 01/24/23 1505   Pulse 70 01/24/23 1507   Resp 10 01/24/23 1508   SpO2 100 % 01/24/23 1507   Vitals shown include unvalidated device data.    Electronically Signed By: RONEL PINO CRNA  January 24, 2023  3:11 PM

## 2023-01-24 NOTE — OP NOTE
Preoperative diagnosis  Left flank pain   Left hydronephrosis    Postoperative diagnosis  Left flank pain   Left hydronephrosis  Left ureteral stone    Procedure performed  Left ureteroscopy with holmium-YAG laser lithotripsy and basket extraction of stone fragments  Cystoscopy with left retrograde pyelogram and ureteral stent placement  Interpretation of retrograde    Surgeon and Assistants (if any)  Surgeon(s):  James Hanley MD  Circulator: aTmra Pickering RN; Amairani Carroll RN  : Lali Fishman  First Assistant: Nancy Marcum RN    Specimen(s)  Yes  Stone fragments for biochemical analysis    (EBL) Estimated blood loss (ml)  5    Anesthesia  General    Complications  None    Findings  Cystoscopy revealed no tumors, stones or other mucosal abnormalities.  Retrograde pyelogram revealed a  mildly dilated system with identification of the stone seen on imaging.    Indications  22 year old pregnant female agreed to undergo the above named procedure after discussion of the alternatives, risks and benefits.    Given she is pregnant we did not have a preop CT scan.  There were clinical factors suggesting a stone and she has been to the ED numerous times.  Informed consent was obtained.      Procedure  The patient was taken to the operating room and placed supine on the operating table.  Pre-operative antibiotics were administered.  Bilateral lower extremity SCDs were placed.  After induction of general anesthesia the patient was positioned in dorsal lithotomy, prepped and draped in a sterile fashion.  A time-out was performed.      I passed a 14 Lithuanian flexible cystoscope carefully via urethra into the bladder.  The left ureteral orifice was identified and a Sensor wire was passed retrograde to the level of the kidney and confirmed by fluoroscopy.  The flexible scope was off-loaded and the bladder emptied with a straight catheter.  An 8-10 coaxial dilator was passed without difficulty and  then removed.  The MR6A semirigid ureteroscope was passed carefully along the Sensor wire through the urethra and into the distal ureter.  The stone was encountered and fragmented with a thulium laser fiber.  The fragments were basket extracted.  At the completion of the procedure, all clinically significant fragments were removed from the ureter and only dust-like fragments remained.  The ureteroscope was withdrawn.  I then passed a flexible ureteroscope through the ureter to the level of the UPJ to confirm no other stones present.    A 5-Pakistani open-ended was passed over the wire and the wire removed.  A retrograde pyelogram was performed by slowly injecting 5 mL of 50% Omnipaque contrast via the 5 Pakistani catheter with findings described above.  An Amplatz super-stiff was placed to the level of the renal pelvis confirmed by fluoroscopy.  A 6 x 24 Pakistani stent was positioned with the upper end in the upper pole and the lower in the bladder confirmed by fluoroscopy. The bladder was emptied and the procedure was complete. The patient tolerated the procedure well and was stable throughout.    ALARA was used with fluoroscopy resulting in 1 sec, a single spot, of fluoroscopy to confirm proximal coil in renal pelvis.  No other fluoro was used.    Plan  Follow up in clinic for stent pull in the next week or so.

## 2023-01-24 NOTE — DISCHARGE INSTRUCTIONS
[facility]  Adult Discharge Orders & Instructions      For 24 hours after surgery:  Get plenty of rest.  A responsible adult must stay with you for at least 24 hours after you leave the hospital.   You may feel lightheaded.  IF so, sit for a few minutes before standing.  Have someone help you get up.   You may have a slight fever. Call the doctor if your fever is over 101 F (38.3 C) (taken under the tongue) or lasts longer than 24 hours.  You may have a dry mouth, a sore throat, muscle aches or trouble sleeping.  These should go away after 24 hours.  Do not make important or legal decisions.  6.   Do not drive or use heavy equipment.  If you have weakness or tingling, don't drive or use heavy equipment until this feeling goes away.                                                                                                                                                                         To contact a doctor, call    [phone]______________

## 2023-01-24 NOTE — OR NURSING
patient has been discharged to home at [facility] via wheelchair accompanied by     Written discharge instructions were provided to patient.  Prescriptions were printed and handed to .      Patient and adult caring for them verbalize understanding of discharge instructions including no driving until tomorrow and no longer taking narcotic pain medications - no operating mechanical equipment and no making any important decisions.They understand reason for discharge, and necessary follow-up appointments.

## 2023-01-28 LAB
APPEARANCE STONE: NORMAL
COMPN STONE: NORMAL
SPECIMEN WT: 3 MG

## 2023-01-29 ENCOUNTER — NURSE TRIAGE (OUTPATIENT)
Dept: NURSING | Facility: CLINIC | Age: 23
End: 2023-01-29
Payer: COMMERCIAL

## 2023-01-29 NOTE — TELEPHONE ENCOUNTER
Caller has a urinary stent after stone removal   and  Is 16 weeks  pregnant; strained at stool and developed  ffank pain ; has taken  1000 mg o ftylenol 4 hrs ago and wants to take a Norco  now for new  pain ; concerned about taking too much acetaminophen; Has taken  2000 mg in past 24 hrs   Advised  safe to take  up to 3,250 mg in 24 hrsl    . Okay to take a Norco if she needs it   Caller understands and will comply   Marifer Holguin RN  FNA    Reason for Disposition    [1] Recent ureteral STENT for kidney stone AND [2] normal post-op symptoms (e.g., blood in urine, nausea, pain) after ureteroscopy or ureteral STENT placement    Additional Information    Negative: [1] Back pain AND [2] NOT recently diagnosed with a kidney stone    Negative: [1] Flank pain (i.e., pain in the side, over the lower ribs or just below the ribs) AND [2] NOT recently diagnosed with a kidney stone    Negative: Shock suspected (e.g., cold/pale/clammy skin, too weak to stand, low BP, rapid pulse)    Negative: Sounds like a life-threatening emergency to the triager    Negative: [1] Unable to urinate (or only a few drops) > 4 hours AND [2] bladder feels very full (e.g., palpable bladder or strong urge to urinate)    Negative: [1] SEVERE pain (e.g., excruciating, scale 8-10) AND [2] not improved after pain medicine    Negative: SEVERE vomiting    Negative: Fever > 103 F (39.4 C)    Negative: Severe chills (i.e., feeling extremely cold WITH shaking chills)    Negative: Patient sounds very sick or weak to the triager    Negative: [1] MODERATE pain (e.g., interferes with normal activities) AND [2] constant AND [3] lasting longer than 4 hours AND [4] NOT improved with pain medicine    Negative: Passing blood or large blood clots (i.e., size > a dime)  (Exception: pink or tea-colored urine, flecks or small strands of blood)    Negative: Fever > 100.4 F (38.0 C)    Negative: [1] Caller has URGENT question (includes prescribed medication questions)  AND [2] triager unable to answer question    Negative: SEVERE burning or pain with passing urine (urination)    Negative: [1] MODERATE pain (e.g., interferes with normal activities) AND [2] pain comes and goes AND [3] present > 24 hours    Negative: Pregnant    Negative: Ureteral stent accidentally came out    Negative: [1] Caller has NON-URGENT question (includes prescribed medication questions) AND [2] triager unable to answer    Negative: Stone has not passed after 4 weeks    Negative: [1] Pain persists AND [2] stone passed > 3 days ago    Negative: [1] Blood in urine (red or tea-colored) AND [2] lasts > 3 days  (Exception: Has a ureteral stent in place.)    Negative: [1] MILD pain (e.g., does not interfere with normal activities) AND [2] pain comes and goes (cramps) AND [3] present > 7 days    Negative: [1] Kidney stone diagnosed by doctor (or NP/PA) AND [2] normal symptoms (e.g., nausea, pain) AND [3] no complications    Protocols used: KIDNEY STONE FOLLOW-UP CALL-A-

## 2023-01-30 ENCOUNTER — OFFICE VISIT (OUTPATIENT)
Dept: UROLOGY | Facility: OTHER | Age: 23
End: 2023-01-30
Attending: UROLOGY
Payer: COMMERCIAL

## 2023-01-30 VITALS
HEART RATE: 84 BPM | TEMPERATURE: 98.6 F | WEIGHT: 105 LBS | BODY MASS INDEX: 21.21 KG/M2 | RESPIRATION RATE: 16 BRPM | OXYGEN SATURATION: 98 %

## 2023-01-30 DIAGNOSIS — N20.0 KIDNEY STONE: Primary | ICD-10-CM

## 2023-01-30 PROCEDURE — 52310 CYSTOSCOPY AND TREATMENT: CPT | Performed by: UROLOGY

## 2023-01-30 PROCEDURE — G0463 HOSPITAL OUTPT CLINIC VISIT: HCPCS | Mod: 25

## 2023-01-30 PROCEDURE — 250N000013 HC RX MED GY IP 250 OP 250 PS 637: Performed by: UROLOGY

## 2023-01-30 RX ADMIN — CEPHALEXIN 500 MG: 250 CAPSULE ORAL at 15:44

## 2023-01-30 ASSESSMENT — PAIN SCALES - GENERAL: PAINLEVEL: MODERATE PAIN (5)

## 2023-01-30 NOTE — PROGRESS NOTES
Preoperative diagnosis  Nephrolithiasis    Postoperative diagnosis  Nephrolithiasis    Procedure  Flexible cystourethroscopy with stent removal    Surgeon  James Hanley MD    Anesthesia  2% lidocaine jelly intraurethrally    Complications  None    Indications  22 year old female who is status post ureteroscopy with holmium laser lithotripsy who presents for stent removal.    Procedure  The patient was given one dose of antibiotics. The patient was placed in supine position and was prepped and draped in sterile fashion.  2% lidocaine jelly was bluntly injected per urethra without difficulty. I passed the 14 French flexible cystoscope through the urethra and into the bladder.  With the aid of a stent grasper I grasped and removed the stent in its entirety.  The patient tolerated the procedure well.    Plan  The patient is pregnant, this was a significant consideration during antibiotic selection as the patient was given Keflex for preprocedure prophylaxis  Discussed generic dietary approach to stone prevention- provided hand out  F/u as needed        I spent 10 minutes on this patient's visit (exclusive of separately billed services/procedures) and over half of this time was spent in face-to-face counseling regarding stone prevention:  Prevention strategies with fluids and diet, rationale for a metabolic work up, prognosis and importance of compliance.

## 2023-01-30 NOTE — NURSING NOTE
Patient positioned in frog leg position prior to James Hanley MD prepping patient with chlorhexidine gluconate cleanser.    [place] Protocol    A. Pre-procedure verification complete Yes   1-relevant information / documentation available, reviewed and properly matched to the patient; 2-consent accurate and complete, 3-equipment and supplies available    B. Site marking complete N/A  Site marked if not in continuous attendance with patient    C. TIME OUT completed Yes  Time Out was conducted just prior to starting procedure to verify the eight required elements: 1-patient identity, 2-consent accurate and complete, 3-position, 4-correct side/site marked (if applicable), 5-procedure, 6-relevant images / results properly labeled and displayed (if applicable), 7-antibiotics / irrigation fluids (if applicable), 8-safety precautions.    After procedure perineum area rinsed. Discharge instructions reviewed with patient. Patient verbalized understanding of discharge instructions and discharged ambulatory.  Belen Huston LPN..................1/30/2023  2:56 PM

## 2023-01-30 NOTE — PATIENT INSTRUCTIONS

## 2023-02-02 ENCOUNTER — PRENATAL OFFICE VISIT (OUTPATIENT)
Dept: OBGYN | Facility: OTHER | Age: 23
End: 2023-02-02
Attending: OBSTETRICS & GYNECOLOGY
Payer: COMMERCIAL

## 2023-02-02 VITALS — SYSTOLIC BLOOD PRESSURE: 102 MMHG | WEIGHT: 104 LBS | BODY MASS INDEX: 21.01 KG/M2 | DIASTOLIC BLOOD PRESSURE: 60 MMHG

## 2023-02-02 DIAGNOSIS — O36.5910 POOR FETAL GROWTH AFFECTING MANAGEMENT OF MOTHER IN FIRST TRIMESTER, SINGLE OR UNSPECIFIED FETUS: Primary | ICD-10-CM

## 2023-02-02 DIAGNOSIS — Z34.02 PREGNANCY, SUPERVISION OF FIRST, SECOND TRIMESTER: ICD-10-CM

## 2023-02-02 PROBLEM — Z34.01 PREGNANCY, SUPERVISION OF FIRST, FIRST TRIMESTER: Status: RESOLVED | Noted: 2022-12-22 | Resolved: 2023-02-02

## 2023-02-02 PROCEDURE — 99207 PR COMPLICATED OB VISIT: CPT | Performed by: OBSTETRICS & GYNECOLOGY

## 2023-02-02 PROCEDURE — G0463 HOSPITAL OUTPT CLINIC VISIT: HCPCS

## 2023-02-02 ASSESSMENT — PAIN SCALES - GENERAL: PAINLEVEL: NO PAIN (0)

## 2023-02-02 NOTE — PROGRESS NOTES
SUBJECTIVE  Trinh Gonzales is a 22 year old  with No LMP recorded (exact date). Patient is pregnant. and her Estimated Date of Delivery: Jul 15, 2023 determined by 9 wk ultrasound. Gestational age is 16w5d. She presents for follow up OB visit.    The patient had left flank pain with hematuria and hydronephrosis was found to have symptomatic nephrolithiasis.  She underwent left ureteroscopy with laser lithotripsy and stent placement on 2023. Her symptoms are now resolved.  Her nausea is improving and she denies any further emesis. She is feeling fetal movement and fluttering. She denies leaking of fluid, vaginal bleeding, cramping or pelvic pressure. She denies abnormal vaginal discharge, difficulty urinating, headache or vision changes, fever or chills. She denies depression or anxiety symptoms with Lexapro and Hydroxyzine.  She continues to take Metformin for polycystic ovarian syndrome.    OBJECTIVE  /60   Wt 47.2 kg (104 lb)   LMP  (Exact Date)   BMI 21.01 kg/m      General:  Well-developed well-nourished female in no apparent distress. Alert and oriented x3.  Abdomen: Soft, non tender, positive bowel sounds. Fundal height below umbilicus.  Fetal heart tones auscultated at 152.  Cervix: deferred  Extremities:  No clubbing, cyanosis, or edema. Nontender bilaterally.    DIAGNOSTICS  2023 Type and Rh: O Positive, ABS: Negative, Rubella: Positive, Treponema: Negative, HBsAg: Negative, Hepatitis C: Negative, HIV: Negative, Hgb: 12.3, Hematocrit: 35.7, Plt: 213, TSH: 1.17, Chlamydia: Negative, Gonorrhea: Negative, Ucx: negative  2023 NIPT negative 46XX  2023 Pap NILM    2023 OB limited ultrasound: Cephalic, positive cardiac activity, positive fetal movement, normal fluid level, anterior placenta without placenta previa.    Pregnancy Dating:   LMP: unknown  LAURITA by 9 wk ultrasound: 07/15/2023  [facility]ospital [facility]: [facility]  's provider:  Undecided    ASSESSMENT / PLAN  1 Intrauterine pregnancy at 16w5d.  2 EDC set by 9 wk ultrasound  3 Polycystic ovarian syndrome  4 Depression and anxiety  5 Nausea and vomiting in pregnancy  6 Kidney stones first trimester (S/P Left ureteroscopy with laser lithotripsy and stent placement 01/24/2023)    - Problem list reviewed and updated.  - Prenatal labs reviewed.  - I discussed and ordered an OB ultrasound for anatomical survey.  Radiology will contact the patient to schedule.  - I discussed use of Lexapro, Hydroxyzine, Metformin, and Zofran in pregnancy.  - I recommend the patient see mental health counselor for depression and anxiety follow-up.  - Symptomatic relief measures for nausea and vomiting in pregnancy reviewed the patient.  - I encouraged the patient to increase fluid intake.  - I encourage patient follow-up with Urology as scheduled.  - I reviewed routine obstetrical precautions, recommendations and instructions with the patient.  - I reviewed routine miscarriage precautions with the patient.  - Follow up in 4 weeks.    Tai Hoang MD  Obstetrics and Gynecology

## 2023-03-01 ENCOUNTER — HOSPITAL ENCOUNTER (OUTPATIENT)
Dept: ULTRASOUND IMAGING | Facility: HOSPITAL | Age: 23
Discharge: HOME | End: 2023-03-01
Attending: OBSTETRICS & GYNECOLOGY
Payer: COMMERCIAL

## 2023-03-01 DIAGNOSIS — Z34.02 PREGNANCY, SUPERVISION OF FIRST, SECOND TRIMESTER: ICD-10-CM

## 2023-03-01 PROBLEM — O36.5920 POOR FETAL GROWTH AFFECTING MANAGEMENT OF MOTHER IN SECOND TRIMESTER: Status: ACTIVE | Noted: 2023-03-01

## 2023-03-01 PROCEDURE — 76820 UMBILICAL ARTERY ECHO: CPT

## 2023-03-01 PROCEDURE — 76805 OB US >/= 14 WKS SNGL FETUS: CPT

## 2023-03-02 ENCOUNTER — MYC MEDICAL ADVICE (OUTPATIENT)
Dept: UROLOGY | Facility: OTHER | Age: 23
End: 2023-03-02
Payer: COMMERCIAL

## 2023-03-02 ENCOUNTER — TRANSCRIBE ORDERS (OUTPATIENT)
Dept: MATERNAL FETAL MEDICINE | Facility: CLINIC | Age: 23
End: 2023-03-02
Payer: COMMERCIAL

## 2023-03-02 ENCOUNTER — PRENATAL OFFICE VISIT (OUTPATIENT)
Dept: OBGYN | Facility: OTHER | Age: 23
End: 2023-03-02
Attending: OBSTETRICS & GYNECOLOGY
Payer: COMMERCIAL

## 2023-03-02 ENCOUNTER — MEDICAL CORRESPONDENCE (OUTPATIENT)
Dept: HEALTH INFORMATION MANAGEMENT | Facility: CLINIC | Age: 23
End: 2023-03-02
Payer: COMMERCIAL

## 2023-03-02 VITALS
SYSTOLIC BLOOD PRESSURE: 90 MMHG | BODY MASS INDEX: 21.89 KG/M2 | DIASTOLIC BLOOD PRESSURE: 60 MMHG | WEIGHT: 108.4 LBS | OXYGEN SATURATION: 99 % | HEART RATE: 79 BPM

## 2023-03-02 DIAGNOSIS — Z34.02 PREGNANCY, SUPERVISION OF FIRST, SECOND TRIMESTER: Primary | ICD-10-CM

## 2023-03-02 DIAGNOSIS — O26.90 PREGNANCY RELATED CONDITION, ANTEPARTUM: Primary | ICD-10-CM

## 2023-03-02 DIAGNOSIS — O36.5920 POOR FETAL GROWTH AFFECTING MANAGEMENT OF MOTHER IN SECOND TRIMESTER, SINGLE OR UNSPECIFIED FETUS: ICD-10-CM

## 2023-03-02 PROCEDURE — G0463 HOSPITAL OUTPT CLINIC VISIT: HCPCS

## 2023-03-02 PROCEDURE — 99207 PR COMPLICATED OB VISIT: CPT | Performed by: OBSTETRICS & GYNECOLOGY

## 2023-03-02 ASSESSMENT — PAIN SCALES - GENERAL: PAINLEVEL: SEVERE PAIN (6)

## 2023-03-02 NOTE — PROGRESS NOTES
SUBJECTIVE  Trinh Gonzales is a 22 year old  with No LMP recorded (exact date). Patient is pregnant.. Estimated Date of Delivery: Jul 15, 2023. Gestational age is 20w5d. She presents for follow up OB visit.    She feels good fetal movement.  She continues to have persistent nausea without emesis in pregnancy.  She denies leaking of fluid, vaginal bleeding, cramping or pelvic pressure. She denies abnormal vaginal discharge, difficulty urinating, headache or vision changes, fever or chills. She denies depression or anxiety symptoms on Lexapro and Hydroxyzine.  She takes Metformin for polycystic ovarian syndrome.    OBJECTIVE  BP 90/60   Pulse 79   Wt 49.2 kg (108 lb 6.4 oz)   LMP  (Exact Date)   SpO2 99%   BMI 21.89 kg/m      General:  Well-developed well-nourished gravid female in no apparent distress. Alert and oriented x3.  Abdomen: Soft, gravid, non tender, positive bowel sounds. Fundal height at 19 cm. Fetal heart tones auscultated at 150.  Cervix: deferred  Extremities:  No clubbing, cyanosis, or edema. Nontender bilaterally.    DIAGNOSTICS  2023 OB ultrasound: 20 4/7 wk, 288 gm, 4%, cephalic presentation, anterior placenta, no placenta previa, normal BARBARA, normal fetal anatomy.  Normal umbilical artery Doppler    ASSESSMENT / PLAN  1 Intrauterine pregnancy at 20w5d.  2 EDC set by 9 wk ultrasound  3 Polycystic ovarian syndrome  4 Depression and anxiety  5 Nausea and vomiting in pregnancy  6 Kidney stones first trimester (S/P Left ureteroscopy with laser lithotripsy and stent placement 2023)  7 IUGR at 20 wk (4th%)    - Problem list reviewed and updated.  - Pregnancy weight gain has been 0.77 kg (1 lb 11.2 oz) to date, which is adequate.   - I reviewed the OB ultrasound with the patient.  - I recommend M consult and level 2 ultrasound.  Ordered.  The patient desires to travel down to [place] as we do not have any telemedicine level 2 ultrasound availability in the next 6  weeks.  - I discussed use of Lexapro, Hydroxyzine, Metformin, and Zofran in pregnancy.  - I recommend the patient see mental health counselor for depression and anxiety follow-up.  - Symptomatic relief measures for nausea and vomiting in pregnancy reviewed the patient.  - I encourage patient follow-up with Urology as scheduled.  - I reviewed routine obstetrical precautions, recommendations and instructions with the patient including fetal movement and kick count instructions.  - I reviewed  labor precautions with the patient with instructions for the patient to come in for decreased fetal movement, suspected rupture of membranes, regular contraction pattern, vaginal bleeding or any other concerning symptoms.  - Follow up in 4 weeks.    Tai Hoang MD  Obstetrics and Gynecology

## 2023-03-07 ENCOUNTER — HOSPITAL ENCOUNTER (EMERGENCY)
Facility: HOSPITAL | Age: 23
Discharge: HOME | End: 2023-03-07
Attending: PHYSICIAN ASSISTANT | Admitting: PHYSICIAN ASSISTANT
Payer: COMMERCIAL

## 2023-03-07 ENCOUNTER — PRE VISIT (OUTPATIENT)
Dept: MATERNAL FETAL MEDICINE | Facility: CLINIC | Age: 23
End: 2023-03-07
Payer: COMMERCIAL

## 2023-03-07 VITALS
RESPIRATION RATE: 18 BRPM | TEMPERATURE: 97.8 F | SYSTOLIC BLOOD PRESSURE: 113 MMHG | DIASTOLIC BLOOD PRESSURE: 67 MMHG | OXYGEN SATURATION: 100 % | HEART RATE: 97 BPM

## 2023-03-07 DIAGNOSIS — R10.9 LEFT FLANK PAIN: Primary | ICD-10-CM

## 2023-03-07 LAB
ALBUMIN UR-MCNC: NEGATIVE MG/DL
APPEARANCE UR: CLEAR
BACTERIA #/AREA URNS HPF: ABNORMAL /HPF
BILIRUB UR QL STRIP: NEGATIVE
COLOR UR AUTO: ABNORMAL
GLUCOSE UR STRIP-MCNC: NEGATIVE MG/DL
HGB UR QL STRIP: NEGATIVE
KETONES UR STRIP-MCNC: ABNORMAL MG/DL
LEUKOCYTE ESTERASE UR QL STRIP: NEGATIVE
MUCOUS THREADS #/AREA URNS LPF: PRESENT /LPF
NITRATE UR QL: NEGATIVE
PH UR STRIP: 6.5 [PH] (ref 4.7–8)
RBC URINE: <1 /HPF
SP GR UR STRIP: 1.01 (ref 1–1.03)
SQUAMOUS EPITHELIAL: 1 /HPF
UROBILINOGEN UR STRIP-MCNC: NORMAL MG/DL
WBC URINE: 2 /HPF

## 2023-03-07 PROCEDURE — 99283 EMERGENCY DEPT VISIT LOW MDM: CPT

## 2023-03-07 PROCEDURE — 99282 EMERGENCY DEPT VISIT SF MDM: CPT | Performed by: PHYSICIAN ASSISTANT

## 2023-03-07 PROCEDURE — 81003 URINALYSIS AUTO W/O SCOPE: CPT | Performed by: EMERGENCY MEDICINE

## 2023-03-07 PROCEDURE — 250N000013 HC RX MED GY IP 250 OP 250 PS 637: Performed by: PHYSICIAN ASSISTANT

## 2023-03-07 RX ORDER — ACETAMINOPHEN 325 MG/1
975 TABLET ORAL ONCE
Status: COMPLETED | OUTPATIENT
Start: 2023-03-07 | End: 2023-03-07

## 2023-03-07 RX ADMIN — ACETAMINOPHEN 975 MG: 325 TABLET ORAL at 19:41

## 2023-03-07 ASSESSMENT — ENCOUNTER SYMPTOMS
WEAKNESS: 0
SHORTNESS OF BREATH: 0
DIZZINESS: 0
APPETITE CHANGE: 0
BRUISES/BLEEDS EASILY: 0
ABDOMINAL PAIN: 0
CHILLS: 0
FEVER: 0
COUGH: 0
ACTIVITY CHANGE: 0
CHEST TIGHTNESS: 0

## 2023-03-07 NOTE — ED TRIAGE NOTES
Left flank pain since 7am. HX of 6 mm stone surgery in December.     Triage Assessment     Row Name 03/07/23 5080       Triage Assessment (Adult)    Airway WDL WDL       Respiratory WDL    Respiratory WDL WDL       Skin Circulation/Temperature WDL    Skin Circulation/Temperature WDL WDL       Cardiac WDL    Cardiac WDL WDL       Peripheral/Neurovascular WDL    Peripheral Neurovascular WDL WDL       Cognitive/Neuro/Behavioral WDL    Cognitive/Neuro/Behavioral WDL WDL

## 2023-03-08 ENCOUNTER — HOSPITAL ENCOUNTER (OUTPATIENT)
Dept: ULTRASOUND IMAGING | Facility: HOSPITAL | Age: 23
Discharge: HOME | End: 2023-03-08
Attending: PHYSICIAN ASSISTANT | Admitting: PHYSICIAN ASSISTANT
Payer: COMMERCIAL

## 2023-03-08 ENCOUNTER — MYC MEDICAL ADVICE (OUTPATIENT)
Dept: UROLOGY | Facility: OTHER | Age: 23
End: 2023-03-08
Payer: COMMERCIAL

## 2023-03-08 ENCOUNTER — TELEPHONE (OUTPATIENT)
Dept: OBGYN | Facility: OTHER | Age: 23
End: 2023-03-08

## 2023-03-08 ENCOUNTER — OFFICE VISIT (OUTPATIENT)
Dept: UROLOGY | Facility: OTHER | Age: 23
End: 2023-03-08
Attending: UROLOGY
Payer: COMMERCIAL

## 2023-03-08 VITALS — WEIGHT: 108 LBS | HEART RATE: 84 BPM | BODY MASS INDEX: 21.81 KG/M2 | OXYGEN SATURATION: 100 %

## 2023-03-08 DIAGNOSIS — N20.1 LEFT URETERAL STONE: Primary | ICD-10-CM

## 2023-03-08 DIAGNOSIS — R10.9 LEFT FLANK PAIN: ICD-10-CM

## 2023-03-08 PROCEDURE — G0463 HOSPITAL OUTPT CLINIC VISIT: HCPCS

## 2023-03-08 PROCEDURE — 99214 OFFICE O/P EST MOD 30 MIN: CPT | Performed by: UROLOGY

## 2023-03-08 PROCEDURE — 76770 US EXAM ABDO BACK WALL COMP: CPT

## 2023-03-08 RX ORDER — CEFTRIAXONE SODIUM 2 G/50ML
2 INJECTION, SOLUTION INTRAVENOUS
Status: CANCELLED | OUTPATIENT
Start: 2023-03-08

## 2023-03-08 ASSESSMENT — PAIN SCALES - GENERAL: PAINLEVEL: WORST PAIN (10)

## 2023-03-08 NOTE — ED NOTES
Patient is discharging to home given information on Flank pain and outpatient US in am. Patient stated understanding of these instructions and is discharging by private auto.

## 2023-03-08 NOTE — H&P (VIEW-ONLY)
Type of Visit  EST    Chief Complaint  Left hydronephrosis and flank pain  Left ureteral stone    HPI  Ms. Gonzales is a 22 year old female 21 weeks pregnant who presents with left hydronephrosis and flank pain.  LAURITA: 7/15/2023    She initially presented to the ED earlier today with severe left flank pain.  The patient recognized the pain immediately as she underwent ureteroscopy for an obstructing left-sided stone just 6 weeks ago.  Earlier today after presenting to the ED with severe left flank pain the patient underwent a renal ultrasound.  The ultrasound identified a mid ureteral stone that was too large to pass.  Given her severe symptoms and a stone that is unlikely to pass she was promptly referred to urology.    The patient denies fevers and chills, dysuria.  Her father has recurring kidney stones he has been managing throughout adulthood.    The patient does not have recent or remote history of a CT scan on the chart for comparison.      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  Patient Active Problem List   Diagnosis     Adjustment disorder with depressed mood     PTSD (post-traumatic stress disorder)     Suicidal behavior     Adjustment disorder with mixed disturbance of emotions and conduct     Anxiety     Astigmatism     PCOS (polycystic ovarian syndrome)     Pregnancy, supervision of first, second trimester     Nausea and vomiting during pregnancy     Poor fetal growth affecting management of mother in second trimester     Past Surgical  History  She  has a past surgical history that includes [place] Tooth Extraction (Bilateral, 2021) and Combined Cystoscopy, Ureteroscopy, Laser Holmium Lithotripsy Ureter(S) (Left, 1/24/2023).    Medications  She has a current medication list which includes the following prescription(s): acetaminophen, albuterol, escitalopram, metformin, ondansetron, phenazopyridine, and prenatal gummies.    Allergies  Allergies   Allergen Reactions     Apple      Gums/mouth gets itchy     Cherry      Gums/mouth gets itchy     Pistachios [Nuts]      Social History  She  reports that she has never smoked. She has never used smokeless tobacco. She reports that she does not drink alcohol and does not use drugs.  No drug abuse.    Family History  Family History   Problem Relation Age of Onset     Mental Illness Mother      Mental Illness Father      Review of Systems  I personally reviewed the ROS with the patient.    Nursing Notes:   Ginny Coburn LPN  3/8/2023  1:07 PM  Signed  Patient presents to clinic for concerns of kidney stones  Review of Systems:    Weight loss:   No     Recent fever/chills:  No   Night sweats:   No  Current skin rash:  No   Recent hair loss:  No  Heat intolerance:  yes   Cold intolerance:  No  Chest pain:   No   Palpitations:   No  Shortness of breath:  No   Wheezing:   No  Constipation:    No   Diarrhea:   No   Nausea:   No   Vomiting:   No   Kidney/side pain:  yes   Back pain:   yes  Frequent headaches:  No   Dizziness:     No  Leg swelling:   No   Calf pain:    No    Parents, brothers or sisters with history of kidney cancer?   No  Parents, brothers or sisters with historyof bladder cancer: No  Ginny Coburn LPN on 3/8/2023 at 1:07 PM              Physical Exam  Vitals:    03/08/23 1306   Pulse: 84   SpO2: 100%   Weight: 49 kg (108 lb)   Constitutional: NAD, WDWN  Cardiovascular: Regular rate and rhythm  Pulmonary/Chest: Respirations are even and non-labored bilaterally.  CTAB  Abdominal: Soft with no  distension, tenderness, masses, guarding.  - left CVA tenderness    - right CVA tenderness, gravid uterus  Neurological: A + O x 3,  cranial nerves II-XII grossly intact  Extremities: MEREDITH x 4, warm, no clubbing, no cyanosis  Skin: Pink, warm, dry with no rash  Psychiatric:  Normal mood and affect  Genitourinary: Nonpalpable bladder    Labs   03/07/23 19:23   Color Urine Light Yellow   Appearance Urine Clear   Glucose Urine Negative   Bilirubin Urine Negative   Ketones Urine Trace !   Specific Gravity Urine 1.011   pH Urine 6.5   Protein Albumin Urine Negative   Urobilinogen mg/dL Normal   Nitrite Urine Negative   Blood Urine Negative   Leukocyte Esterase Urine Negative   WBC Urine 2   RBC Urine <1   Bacteria Urine Few !   Squamous Epithelial /HPF Urine 1   Mucus Urine Present !     Stone analysis  1/24/2023  Calculi composed primarily of:   10% calcium oxalate monohydrate,   50% calcium oxalate dihydrate, and   40% calcium phosphate (hydroxy- and carbonate- apatite).     Imaging  I personally reviewed and interpreted the images and report.  TE  3/8/2023  IMPRESSION:  Proximal left ureteric calculus with dilation of the left  renal collecting system      ^^^^^^^^^^^^^^^^^^^^^^^^^^^^^^^^    Renal Ultrasound  1/18/2023  Narrative & Impression   PROCEDURE: US RENAL COMPLETE NON-VASCULAR     HISTORY:22 years Female  pregnant, left flank pain, gross hematuria..     TECHNIQUE: Targeted sonographic assessment of the kidneys and bladder  was performed.     COMPARISON:  None.     MEASUREMENTS:     Right renal length: 10.2 cm  Left renal length: 4.0 cm     RENAL FINDINGS: Cortical echogenicity is normal. There is mild  left-sided hydronephrosis. There is no right-sided hydronephrosis.    IMPRESSION:  Mild left-sided hydronephrosis.       Assessment & Plan  Ms. Gonzales is a 22 year old female 21 weeks pregnant who presents with left hydronephrosis and flank pain clinically consistent with a left-sided obstructing  "stone.    We discussed that the hydronephrosis may or may not reflect obstruction from a ureteral stone.  Given how early she is in her pregnancy a stone is significantly more likely than obstructed from a gravid uterus.    We discussed that CT scans are the ideal imaging study to evaluate for stones however given she is pregnant this would result in radiation exposure to her unborn child.  Because of this we will defer CT imaging.    We discussed options for the presumed obstructing stone:  Observation, ureteral stent and nephrostomy tube.  Discussed the risks and benefits of all options for the patient and her unborn baby.    We discussed that she likely has an obstructing left-sided stone given the circumstantial evidence all listed above including an ultrasound performed over a year ago revealing a left-sided stone, left hydronephrosis, left flank pain with gross hematuria.    Discussed the treatment options for the ureteral stone including trial of passage and ureteroscopy with laser lithotripsy.    After explaining the risks, benefits, and alternatives a decision was made to proceed with ureteroscopy/laser lithotripsy.    The patient was explained the specific risks of bleeding, pain, infection, and ureteral injury.    In addition, the patient was told a stent may need to be placed which could result in urinary frequency, urgency, dysuria, and flank pain with voiding.    It would require an office based procedure to remove it at a later date.    Finally, the patient was told if the stone was very impacted, that we would place a stent and return at a later date to treat the stone.      Finally we discussed radiation exposure to her unborn child.  I explained that I will limit the radiation to the absolute lowest possible amount using ALARA approach to radiation.    Plan  The patient was scheduled and consented for \"left ureteroscopy with holmium laser lithotripsy and stent placement\" in the OR (LMA general " anesthesia).

## 2023-03-08 NOTE — DISCHARGE INSTRUCTIONS
SUBJECTIVE:   Natasha Lee is a 41 year old female who presents to clinic today for the following health issues:      1. Follow-up on left sided bunion. Patient notes starting to have some pain would rate 3-5/10. Patient was seen before at [facility] for the bunion but feels like it didn't help.  Was seen by Dr Argueta at [facility] and was told to monitor it until it causes pain but now it does every day. Is now having more local numbness at inner aspect of foot.   Was not discussion of orthotics at all and was told could wear tennis shoes.     2. Possible celiac allergy testing.  With gallbladder issues in the past had stopped the gluten and felt better  When she reintroduced the gluten has noticed having more issues with her stomach and dyspepsia     3. Would like to discuss about FMLA paperwork. Patient noted her rheumatogist just left two days ago. Would like to discuss with pcp to see if she should see rheumatologist or have pcp fill out forms.   Was seeing rheumatology and her rheumatologist has just left.   Never had to do this before but with her illness will have flare ups periodically and needs documentation that would need to be out sporadically       Problem list and histories reviewed & adjusted, as indicated.  Additional history: as documented    Patient Active Problem List   Diagnosis     Chronic hepatitis B (H)     Lupus (systemic lupus erythematosus) (H)     CARDIOVASCULAR SCREENING; LDL GOAL LESS THAN 160     Health Care Home     Dysmenorrhea     Menorrhagia     Allergic rhinitis     Other nonspecific finding on examination of urine     Urinary frequency     Vitamin B12 deficiency (non anemic)     Heart murmur     S/P laparoscopic cholecystectomy     Past Surgical History:   Procedure Laterality Date     ANESTH,SKIN SURGERY,KNEE  1993, 1990    orthoscopic     C APPENDECTOMY  4/2008     CL AFF SURGICAL PATHOLOGY  2007     LAPAROSCOPIC CHOLECYSTECTOMY N/A 5/18/2016    Procedure: LAPAROSCOPIC  You can continue your previously prescribed pain medications.    Rest and stay hydrated.    Please return to this emergency department for any fevers or worsening pain.    You can expect a call from radiology for the ultrasound in the morning.   CHOLECYSTECTOMY;  Surgeon: Radha Cline MD;  Location: [facility] OR     LAPAROSCOPIC TUBAL LIGATION  12/15/2011    Procedure:LAPAROSCOPIC TUBAL LIGATION; Laparoscopic tubal ligation; Surgeon:AMY WYNN; Location: OR       Social History   Substance Use Topics     Smoking status: Never Smoker     Smokeless tobacco: Never Used     Alcohol use No     Family History   Problem Relation Age of Onset     Unknown/Adopted Mother      Unknown/Adopted Father          Current Outpatient Prescriptions   Medication Sig Dispense Refill     betamethasone dipropionate (DIPROSONE) 0.05 % cream Apply sparingly to affected area twice daily as needed.  Do not apply to face. 45 g 0     DiphenhydrAMINE HCl (BENADRYL PO)        triamcinolone (KENALOG) 0.1 % cream Apply sparingly to affected area three times daily prn. 30 g 0     norethindrone-ethinyl estradiol (ORTHO-NOVUM 1-35 TAB,NORTREL 1-35 TAB) 1-35 MG-MCG per tablet Take 1 tablet by mouth daily 84 tablet 4     albuterol (PROAIR HFA, PROVENTIL HFA, VENTOLIN HFA) 108 (90 BASE) MCG/ACT inhaler Inhale 2 puffs into the lungs every 4 hours as needed for shortness of breath / dyspnea or wheezing 1 Inhaler 1     senna-docusate (SENOKOT-S;PERICOLACE) 8.6-50 MG per tablet Take 1-2 tablets by mouth 2 times daily 30 tablet 0     predniSONE (DELTASONE) 5 MG tablet 20 mg for 1 week, taper by 5 mg weekly 70 tablet 1     Cyanocobalamin (B-12) 1000 MCG TBCR Take 1,000 mcg by mouth daily 100 tablet 1     fluticasone (FLONASE) 50 MCG/ACT nasal spray Spray 2 sprays into both nostrils daily 16 g 3     clobetasol (TEMOVATE) 0.05 % cream Apply sparingly to affected area twice a week. 60 g 2     multivitamin, therapeutic with minerals (MULTI-VITAMIN) TABS Take 1 tablet by mouth daily       Cranberry 1000 MG CAPS Take 1 capsule by mouth 4 times daily       DiphenhydrAMINE HCl, Sleep, (UNISOM SLEEPGELS) 50 MG CAPS Take 50 mg by mouth At Bedtime       omeprazole 20 MG tablet Take 1 tablet by  mouth daily. Take 30-60 minutes before a meal. 30 tablet 1     Allergies   Allergen Reactions     Carbamazepine      Fever, rash     Nyquil Severe Cold-Flu [Vicks Nyquil Severe]      hives     Labs reviewed in EPIC      Reviewed and updated as needed this visit by clinical staffTobacco  Allergies  Meds  Med Hx  Surg Hx  Fam Hx  Soc Hx      Reviewed and updated as needed this visit by Provider         ROS:  CONSTITUTIONAL:NEGATIVE for fever, chills, change in weight  ENT/MOUTH: NEGATIVE for ear, mouth and throat problems  RESP:NEGATIVE for significant cough or SOB  CV: NEGATIVE for chest pain, palpitations or peripheral edema  GI: POSITIVE for dyspepsia and NEGATIVE for nausea, poor appetite, vomiting and weight loss  MUSCULOSKELETAL: POSITIVE  for foot pain  and NEGATIVE for joint swelling  and joint warmth   ENDOCRINE: NEGATIVE for temperature intolerance, skin/hair changes    OBJECTIVE:     /80 (BP Location: Right arm, Patient Position: Sitting, Cuff Size: Adult Regular)  Pulse 73  Temp 96.8  F (36  C) (Temporal)  LMP 12/27/2017  SpO2 100%  Breastfeeding? No  There is no height or weight on file to calculate BMI.  GENERAL APPEARANCE: alert, active and no distress  RESP: lungs clear to auscultation - no rales, rhonchi or wheezes  CV: regular rates and rhythm and no murmur, click or rub  MS: extremities normal- no gross deformities noted  ORTHO: Foot Exam: Inspection:  no swelling   Tender::bunion deformity  Non-tender:proximal 5th metatarsal, midshaft 5th metatarsal  Range of Motion:flexion of toes:  full, extension of toes  full  SKIN: no suspicious lesions or rashes  PSYCH: mentation appears normal and affect normal/bright    Diagnostic Test Results:  Results for orders placed or performed in visit on 01/05/18   Allergy adult food panel   Result Value Ref Range    Allergen Clam <0.10 <0.10 KU(A)/L    Allergen Fish(Cod) <0.10 <0.10 KU(A)/L    Allergen Egg White <0.10 <0.10 KU(A)/L    Allergen  Driscoll <0.10 <0.10 KU(A)/L    Allergen Milk <0.10 <0.10 KU/L    Allergen Peanut <0.10 <0.10 KU(A)/L    Allergen Shrimp <0.10 <0.10 KU(A)/L    Allergen Soybean IgE <0.10 <0.10 KU(A)/L    Allergen, [place] <0.10 <0.10 KU(A)/L    Allergen Wheat <0.10 <0.10 KU(A)/L    Allergen Scallop <0.10 <0.10 KU(A)/L   Tissue transglutaminase gerson IgA and IgG   Result Value Ref Range    Tissue Transglutaminase Antibody IgA 1 <7 U/mL    Tissue Transglutaminase Gerson IgG 1 <7 U/mL       ASSESSMENT/PLAN:     Natasha was seen today for bunion, allergy consult and forms.    Diagnoses and all orders for this visit:    Dyspepsia and disorder of function of stomach  -     Allergy adult food panel  -     Tissue transglutaminase gerson IgA and IgG  Will follow up and/or notify patient of  results via My Chart to determine further need for followup      Systemic lupus erythematosus, unspecified SLE type, unspecified organ involvement status (H)  -     RHEUMATOLOGY REFERRAL    Bunion, left foot  Further workup and treatment could be done if symptoms persist, worsen or new related symptoms occur. The patient will call in that eventuality.    PLAN:   1.   Symptomatic therapy suggested: Continue current medications.  2.  Orders Placed This Encounter   Procedures     Allergy adult food panel     Tissue transglutaminase gerson IgA and IgG     RHEUMATOLOGY REFERRAL       3. Patient needs to follow up in if no improvement,or sooner if worsening of symptoms or other symptoms develop.  CONSULTATION/REFERRAL to Rheumatology( Dr. Zaman)  Will make referral to foot surgeon in future  Will follow up and/or notify patient of  results via My Chart to determine further need for followup    See Patient Instructions    ELIAS Guerra [facility]

## 2023-03-08 NOTE — PROGRESS NOTES
Type of Visit  EST    Chief Complaint  Left hydronephrosis and flank pain  Left ureteral stone    HPI  Ms. Gonzales is a 22 year old female 21 weeks pregnant who presents with left hydronephrosis and flank pain.  LAURITA: 7/15/2023    She initially presented to the ED earlier today with severe left flank pain.  The patient recognized the pain immediately as she underwent ureteroscopy for an obstructing left-sided stone just 6 weeks ago.  Earlier today after presenting to the ED with severe left flank pain the patient underwent a renal ultrasound.  The ultrasound identified a mid ureteral stone that was too large to pass.  Given her severe symptoms and a stone that is unlikely to pass she was promptly referred to urology.    The patient denies fevers and chills, dysuria.  Her father has recurring kidney stones he has been managing throughout adulthood.    The patient does not have recent or remote history of a CT scan on the chart for comparison.      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  Patient Active Problem List   Diagnosis     Adjustment disorder with depressed mood     PTSD (post-traumatic stress disorder)     Suicidal behavior     Adjustment disorder with mixed disturbance of emotions and conduct     Anxiety     Astigmatism     PCOS (polycystic ovarian syndrome)     Pregnancy, supervision of first, second trimester     Nausea and vomiting during pregnancy     Poor fetal growth affecting management of mother in second trimester     Past Surgical  History  She  has a past surgical history that includes [place] Tooth Extraction (Bilateral, 2021) and Combined Cystoscopy, Ureteroscopy, Laser Holmium Lithotripsy Ureter(S) (Left, 1/24/2023).    Medications  She has a current medication list which includes the following prescription(s): acetaminophen, albuterol, escitalopram, metformin, ondansetron, phenazopyridine, and prenatal gummies.    Allergies  Allergies   Allergen Reactions     Apple      Gums/mouth gets itchy     Cherry      Gums/mouth gets itchy     Pistachios [Nuts]      Social History  She  reports that she has never smoked. She has never used smokeless tobacco. She reports that she does not drink alcohol and does not use drugs.  No drug abuse.    Family History  Family History   Problem Relation Age of Onset     Mental Illness Mother      Mental Illness Father      Review of Systems  I personally reviewed the ROS with the patient.    Nursing Notes:   Ginny Coburn LPN  3/8/2023  1:07 PM  Signed  Patient presents to clinic for concerns of kidney stones  Review of Systems:    Weight loss:   No     Recent fever/chills:  No   Night sweats:   No  Current skin rash:  No   Recent hair loss:  No  Heat intolerance:  yes   Cold intolerance:  No  Chest pain:   No   Palpitations:   No  Shortness of breath:  No   Wheezing:   No  Constipation:    No   Diarrhea:   No   Nausea:   No   Vomiting:   No   Kidney/side pain:  yes   Back pain:   yes  Frequent headaches:  No   Dizziness:     No  Leg swelling:   No   Calf pain:    No    Parents, brothers or sisters with history of kidney cancer?   No  Parents, brothers or sisters with historyof bladder cancer: No  Ginny Coburn LPN on 3/8/2023 at 1:07 PM              Physical Exam  Vitals:    03/08/23 1306   Pulse: 84   SpO2: 100%   Weight: 49 kg (108 lb)   Constitutional: NAD, WDWN  Cardiovascular: Regular rate and rhythm  Pulmonary/Chest: Respirations are even and non-labored bilaterally.  CTAB  Abdominal: Soft with no  distension, tenderness, masses, guarding.  - left CVA tenderness    - right CVA tenderness, gravid uterus  Neurological: A + O x 3,  cranial nerves II-XII grossly intact  Extremities: MEREDITH x 4, warm, no clubbing, no cyanosis  Skin: Pink, warm, dry with no rash  Psychiatric:  Normal mood and affect  Genitourinary: Nonpalpable bladder    Labs   03/07/23 19:23   Color Urine Light Yellow   Appearance Urine Clear   Glucose Urine Negative   Bilirubin Urine Negative   Ketones Urine Trace !   Specific Gravity Urine 1.011   pH Urine 6.5   Protein Albumin Urine Negative   Urobilinogen mg/dL Normal   Nitrite Urine Negative   Blood Urine Negative   Leukocyte Esterase Urine Negative   WBC Urine 2   RBC Urine <1   Bacteria Urine Few !   Squamous Epithelial /HPF Urine 1   Mucus Urine Present !     Stone analysis  1/24/2023  Calculi composed primarily of:   10% calcium oxalate monohydrate,   50% calcium oxalate dihydrate, and   40% calcium phosphate (hydroxy- and carbonate- apatite).     Imaging  I personally reviewed and interpreted the images and report.  TE  3/8/2023  IMPRESSION:  Proximal left ureteric calculus with dilation of the left  renal collecting system      ^^^^^^^^^^^^^^^^^^^^^^^^^^^^^^^^    Renal Ultrasound  1/18/2023  Narrative & Impression   PROCEDURE: US RENAL COMPLETE NON-VASCULAR     HISTORY:22 years Female  pregnant, left flank pain, gross hematuria..     TECHNIQUE: Targeted sonographic assessment of the kidneys and bladder  was performed.     COMPARISON:  None.     MEASUREMENTS:     Right renal length: 10.2 cm  Left renal length: 4.0 cm     RENAL FINDINGS: Cortical echogenicity is normal. There is mild  left-sided hydronephrosis. There is no right-sided hydronephrosis.    IMPRESSION:  Mild left-sided hydronephrosis.       Assessment & Plan  Ms. Gonzales is a 22 year old female 21 weeks pregnant who presents with left hydronephrosis and flank pain clinically consistent with a left-sided obstructing  "stone.    We discussed that the hydronephrosis may or may not reflect obstruction from a ureteral stone.  Given how early she is in her pregnancy a stone is significantly more likely than obstructed from a gravid uterus.    We discussed that CT scans are the ideal imaging study to evaluate for stones however given she is pregnant this would result in radiation exposure to her unborn child.  Because of this we will defer CT imaging.    We discussed options for the presumed obstructing stone:  Observation, ureteral stent and nephrostomy tube.  Discussed the risks and benefits of all options for the patient and her unborn baby.    We discussed that she likely has an obstructing left-sided stone given the circumstantial evidence all listed above including an ultrasound performed over a year ago revealing a left-sided stone, left hydronephrosis, left flank pain with gross hematuria.    Discussed the treatment options for the ureteral stone including trial of passage and ureteroscopy with laser lithotripsy.    After explaining the risks, benefits, and alternatives a decision was made to proceed with ureteroscopy/laser lithotripsy.    The patient was explained the specific risks of bleeding, pain, infection, and ureteral injury.    In addition, the patient was told a stent may need to be placed which could result in urinary frequency, urgency, dysuria, and flank pain with voiding.    It would require an office based procedure to remove it at a later date.    Finally, the patient was told if the stone was very impacted, that we would place a stent and return at a later date to treat the stone.      Finally we discussed radiation exposure to her unborn child.  I explained that I will limit the radiation to the absolute lowest possible amount using ALARA approach to radiation.    Plan  The patient was scheduled and consented for \"left ureteroscopy with holmium laser lithotripsy and stent placement\" in the OR (LMA general " anesthesia).

## 2023-03-08 NOTE — NURSING NOTE
Patient presents to clinic for concerns of kidney stones  Review of Systems:    Weight loss:   No     Recent fever/chills:  No   Night sweats:   No  Current skin rash:  No   Recent hair loss:  No  Heat intolerance:  yes   Cold intolerance:  No  Chest pain:   No   Palpitations:   No  Shortness of breath:  No   Wheezing:   No  Constipation:    No   Diarrhea:   No   Nausea:   No   Vomiting:   No   Kidney/side pain:  yes   Back pain:   yes  Frequent headaches:  No   Dizziness:     No  Leg swelling:   No   Calf pain:    No    Parents, brothers or sisters with history of kidney cancer?   No  Parents, brothers or sisters with historyof bladder cancer: No  Ginny Coburn LPN on 3/8/2023 at 1:07 PM

## 2023-03-08 NOTE — TELEPHONE ENCOUNTER
Agree.  The patient needs to see urology for management of kidney stone.   The [facility] consult level 2 ultrasound can be rescheduled.   Tai Hoang MD

## 2023-03-08 NOTE — ED PROVIDER NOTES
History     Chief Complaint   Patient presents with     Flank Pain     The history is provided by the patient.     Trinh Gonzales is a 22 year old female who presented to the emergency department ambulatory for evaluation of approximately 12 hours of left flank pain.  I evaluated this patient for very similar complaint in the middle of January.  Found to have a left ureteral stone after urology examination.  She underwent ureteroscopy with stent placement the end of January.  Pain has returned and is similar to previous.  Left flank.  No fevers or chills.  G1, P0.  Baby has been active throughout the day.    Allergies:  Allergies   Allergen Reactions     Apple      Gums/mouth gets itchy     Cherry      Gums/mouth gets itchy     Pistachios [Nuts]        Problem List:    Patient Active Problem List    Diagnosis Date Noted     Poor fetal growth affecting management of mother in second trimester 2023     Priority: Medium     Pregnancy, supervision of first, second trimester 2022     Priority: Medium     Pregnancy Overview  22 year old  with LMP unknown, LAURITA 07/15/2023.    1 EDC 07/15/2023 set by 9 wk ultrasound  2 Polycystic ovarian syndrome  3 Depression and anxiety  4 Nausea and vomiting in pregnancy  5 Kidney stones first trimester (S/P Left ureteroscopy with laser lithotripsy and stent placement 2023)   6 IUGR at 20 wk (4th%)    - Metformin  - Lexapro  - Hydroxyzine  - Zofran ODT  - MFM consult and level 2 ultrasound  - Plan TdaP at 28 wk.  - Decline Influenza  - NIPT negative 46XX  - Covid immunization x2    Type and Rh: O Positive  ABS: Negative  Rubella: Positive  Treponema: Negative  HBsAg: Negative  Hepatitis C: Negative  HIV: Negative  Hgb: 12.3  Hematocrit: 35.7  Plt: 213  TSH: 1.17  Chlamydia: Negative  Gonorrhea: Negative  Ucx: negative  Glucose screen:   GBS:     2023 Pap NILM       Nausea and vomiting during pregnancy 2022     Priority: Medium     PCOS  (polycystic ovarian syndrome) 07/18/2022     Priority: Medium     Anxiety 12/11/2019     Priority: Medium     Adjustment disorder with depressed mood 01/22/2018     Priority: Medium     PTSD (post-traumatic stress disorder) 01/22/2018     Priority: Medium     Suicidal behavior 05/08/2016     Priority: Medium     Adjustment disorder with mixed disturbance of emotions and conduct 05/08/2016     Priority: Medium     Astigmatism 11/30/2010     Priority: Medium     Formatting of this note might be different from the original.  IMO Update          Past Medical History:    Past Medical History:   Diagnosis Date     Adjustment disorder with depressed mood 01/22/2018     Adjustment disorder with mixed disturbance of emotions and conduct 05/08/2016     Anxiety 12/11/2019     Astigmatism 11/30/2010     Irregular menses 05/23/2022     Nephrolithiasis 01/24/2023     PCOS (polycystic ovarian syndrome) 07/18/2022     Primary oligomenorrhea 05/23/2022     PTSD (post-traumatic stress disorder) 01/22/2018     Suicidal behavior 05/08/2016     Thelarche, premature 07/29/2008       Past Surgical History:    Past Surgical History:   Procedure Laterality Date     COMBINED CYSTOSCOPY, URETEROSCOPY, LASER HOLMIUM LITHOTRIPSY URETER(S) Left 1/24/2023    Procedure: Left ureteroscopy with laser lithotripsy and stent placement;  Surgeon: James Hanley MD;  Location: [facility] OR     WISDOM TOOTH EXTRACTION Bilateral 2021       Family History:    Family History   Problem Relation Age of Onset     Mental Illness Mother      Mental Illness Father        Social History:  Marital Status:   [2]  Social History     Tobacco Use     Smoking status: Never     Smokeless tobacco: Never   Substance Use Topics     Alcohol use: No     Drug use: No        Medications:    acetaminophen (TYLENOL) 500 MG tablet  albuterol (PROAIR HFA/PROVENTIL HFA/VENTOLIN HFA) 108 (90 Base) MCG/ACT inhaler  escitalopram (LEXAPRO) 10 MG tablet  metFORMIN (GLUCOPHAGE) 500 MG  tablet  ondansetron (ZOFRAN ODT) 4 MG ODT tab  phenazopyridine (PYRIDIUM) 200 MG tablet  Prenatal MV & Min w/FA-DHA (PRENATAL GUMMIES) 0.18-25 MG CHEW          Review of Systems   Constitutional: Negative for activity change, appetite change, chills and fever.   Respiratory: Negative for cough, chest tightness and shortness of breath.    Cardiovascular: Negative for chest pain.   Gastrointestinal: Negative for abdominal pain.   Genitourinary: Negative.    Skin: Negative.    Neurological: Negative for dizziness and weakness.   Hematological: Does not bruise/bleed easily.       Physical Exam   BP: 117/73  Pulse: 109  Temp: 97.5  F (36.4  C)  Resp: 16      Physical Exam  Vitals and nursing note reviewed.   Constitutional:       Appearance: Normal appearance.   Cardiovascular:      Rate and Rhythm: Regular rhythm.      Comments: Mild tachycardia  Pulmonary:      Effort: Pulmonary effort is normal.   Abdominal:      Palpations: Abdomen is soft.      Comments: Abdomen is soft and nontender.  Palpation noted to be likely stated gestational age.   Skin:     General: Skin is warm and dry.      Capillary Refill: Capillary refill takes less than 2 seconds.   Neurological:      General: No focal deficit present.      Mental Status: She is alert.   Psychiatric:         Mood and Affect: Mood normal.         ED Course                 Procedures              Critical Care time:  none               Results for orders placed or performed during the hospital encounter of 03/07/23 (from the past 24 hour(s))   UA with Microscopic reflex to Culture    Specimen: Urine, Midstream   Result Value Ref Range    Color Urine Light Yellow Colorless, Straw, Light Yellow, Yellow    Appearance Urine Clear Clear    Glucose Urine Negative Negative mg/dL    Bilirubin Urine Negative Negative    Ketones Urine Trace (A) Negative mg/dL    Specific Gravity Urine 1.011 1.003 - 1.035    Blood Urine Negative Negative    pH Urine 6.5 4.7 - 8.0    Protein  Albumin Urine Negative Negative mg/dL    Urobilinogen Urine Normal Normal, 2.0 mg/dL    Nitrite Urine Negative Negative    Leukocyte Esterase Urine Negative Negative    Bacteria Urine Few (A) None Seen /HPF    Mucus Urine Present (A) None Seen /LPF    RBC Urine <1 <=2 /HPF    WBC Urine 2 <=5 /HPF    Squamous Epithelials Urine 1 <=1 /HPF    Narrative    Urine Culture not indicated       Medications   acetaminophen (TYLENOL) tablet 975 mg (975 mg Oral $Given 3/7/23 1941)       Assessments & Plan (with Medical Decision Making)   This is a 22-year-old female who presented the emergency department for evaluation of acute left flank discomfort starting this morning.  She has been seen for similar complaints a few months ago and found to have a 6 mm left ureteral stone that was treated via ureteroscopy.  Approximate 21 weeks gestation.  She tells me the pain is identical to her previous.  Unfortunately do not have access to formal ultrasound tonight.  Her urinalysis is negative.  She looks well.  She has no fevers.  We do long detailed discussion.  At this time I do not believe that advanced imaging such as CT scan would have any benefit.  I will plan for a formal ultrasound in the morning.  She has pain medications at home.  She is not vomiting.  The patient voiced complete understanding and was quite happy and agreeable.    This document was prepared using a combination of typing and voice generated software.  While every attempt was made for accuracy, spelling and grammatical errors may exist.    I have reviewed the nursing notes.    I have reviewed the findings, diagnosis, plan and need for follow up with the patient.           Medical Decision Making  The patient's presentation was of low complexity (an acute and uncomplicated illness or injury).    The patient's evaluation involved:  ordering and/or review of 1 test(s) in this encounter (Urinalysis)    The patient's management necessitated only low risk  treatment.        New Prescriptions    No medications on file       Final diagnoses:   Left flank pain       3/7/2023   [facility]     Nii Priest PA-C  03/07/23 2006

## 2023-03-08 NOTE — TELEPHONE ENCOUNTER
Pt states she is hoping it is ok to reschedule her MFM consult and USG for tomorrow due to having kidney stone surgery again tomorrow. Pt states she would need to reschedule the USG to either 3/14 or 3/17. I told pt per Dr. Hoang that it is ok to reschedule her USG she needs to take care of the kidney stone first.  Andreia Nagy, BENJAMINN

## 2023-03-09 ENCOUNTER — ANESTHESIA EVENT (OUTPATIENT)
Dept: SURGERY | Facility: OTHER | Age: 23
End: 2023-03-09
Payer: COMMERCIAL

## 2023-03-09 ENCOUNTER — ANESTHESIA (OUTPATIENT)
Dept: SURGERY | Facility: OTHER | Age: 23
End: 2023-03-09
Payer: COMMERCIAL

## 2023-03-09 ENCOUNTER — HOSPITAL ENCOUNTER (OUTPATIENT)
Facility: OTHER | Age: 23
Discharge: HOME | End: 2023-03-09
Attending: UROLOGY | Admitting: UROLOGY
Payer: COMMERCIAL

## 2023-03-09 ENCOUNTER — HOSPITAL ENCOUNTER (OUTPATIENT)
Dept: GENERAL RADIOLOGY | Facility: OTHER | Age: 23
Discharge: HOME | End: 2023-03-09
Attending: UROLOGY | Admitting: UROLOGY
Payer: COMMERCIAL

## 2023-03-09 VITALS
HEIGHT: 59 IN | WEIGHT: 108 LBS | BODY MASS INDEX: 21.77 KG/M2 | RESPIRATION RATE: 18 BRPM | OXYGEN SATURATION: 100 % | SYSTOLIC BLOOD PRESSURE: 101 MMHG | TEMPERATURE: 97.2 F | HEART RATE: 97 BPM | DIASTOLIC BLOOD PRESSURE: 64 MMHG

## 2023-03-09 DIAGNOSIS — Z96.0 S/P URETERAL STENT PLACEMENT: ICD-10-CM

## 2023-03-09 DIAGNOSIS — Z98.890 H/O URETEROSCOPY: ICD-10-CM

## 2023-03-09 DIAGNOSIS — Z98.890 STATUS POST LASER LITHOTRIPSY OF URETERAL CALCULUS: ICD-10-CM

## 2023-03-09 DIAGNOSIS — N20.1 LEFT URETERAL STONE: Primary | ICD-10-CM

## 2023-03-09 PROCEDURE — C1758 CATHETER, URETERAL: HCPCS | Performed by: UROLOGY

## 2023-03-09 PROCEDURE — 272N000001 HC OR GENERAL SUPPLY STERILE: Performed by: UROLOGY

## 2023-03-09 PROCEDURE — 999N000141 HC STATISTIC PRE-PROCEDURE NURSING ASSESSMENT: Performed by: UROLOGY

## 2023-03-09 PROCEDURE — 999N000180 XR SURGERY CARM FLUORO LESS THAN 5 MIN: Mod: TC

## 2023-03-09 PROCEDURE — C1769 GUIDE WIRE: HCPCS | Performed by: UROLOGY

## 2023-03-09 PROCEDURE — 258N000003 HC RX IP 258 OP 636

## 2023-03-09 PROCEDURE — 272N000002 HC OR SUPPLY OTHER OPNP: Performed by: UROLOGY

## 2023-03-09 PROCEDURE — C2617 STENT, NON-COR, TEM W/O DEL: HCPCS | Performed by: UROLOGY

## 2023-03-09 PROCEDURE — 250N000013 HC RX MED GY IP 250 OP 250 PS 637: Performed by: NURSE ANESTHETIST, CERTIFIED REGISTERED

## 2023-03-09 PROCEDURE — 250N000011 HC RX IP 250 OP 636: Performed by: UROLOGY

## 2023-03-09 PROCEDURE — 250N000011 HC RX IP 250 OP 636: Performed by: NURSE ANESTHETIST, CERTIFIED REGISTERED

## 2023-03-09 PROCEDURE — 258N000003 HC RX IP 258 OP 636: Performed by: NURSE ANESTHETIST, CERTIFIED REGISTERED

## 2023-03-09 PROCEDURE — 74420 UROGRAPHY RTRGR +-KUB: CPT | Mod: 26 | Performed by: UROLOGY

## 2023-03-09 PROCEDURE — 52356 CYSTO/URETERO W/LITHOTRIPSY: CPT

## 2023-03-09 PROCEDURE — 258N000001 HC RX 258: Performed by: UROLOGY

## 2023-03-09 PROCEDURE — 710N000010 HC RECOVERY PHASE 1, LEVEL 2, PER MIN: Performed by: UROLOGY

## 2023-03-09 PROCEDURE — C1894 INTRO/SHEATH, NON-LASER: HCPCS | Performed by: UROLOGY

## 2023-03-09 PROCEDURE — 360N000083 HC SURGERY LEVEL 3 W/ FLUORO, PER MIN: Performed by: UROLOGY

## 2023-03-09 PROCEDURE — 370N000017 HC ANESTHESIA TECHNICAL FEE, PER MIN: Performed by: UROLOGY

## 2023-03-09 PROCEDURE — 710N000012 HC RECOVERY PHASE 2, PER MINUTE: Performed by: UROLOGY

## 2023-03-09 PROCEDURE — 52356 CYSTO/URETERO W/LITHOTRIPSY: CPT | Performed by: UROLOGY

## 2023-03-09 PROCEDURE — 250N000011 HC RX IP 250 OP 636

## 2023-03-09 DEVICE — STENT URETERAL CONTOUR SOFT PERCUFLEX 6FRX24CM: Type: IMPLANTABLE DEVICE | Site: URETER | Status: FUNCTIONAL

## 2023-03-09 RX ORDER — SODIUM CHLORIDE 9 MG/ML
INJECTION, SOLUTION INTRAVENOUS CONTINUOUS
Status: DISCONTINUED | OUTPATIENT
Start: 2023-03-09 | End: 2023-03-09 | Stop reason: HOSPADM

## 2023-03-09 RX ORDER — HYDROCODONE BITARTRATE AND ACETAMINOPHEN 5; 325 MG/1; MG/1
1-2 TABLET ORAL EVERY 6 HOURS PRN
Qty: 15 TABLET | Refills: 0 | Status: SHIPPED | OUTPATIENT
Start: 2023-03-09 | End: 2023-03-17

## 2023-03-09 RX ORDER — LIDOCAINE 40 MG/G
CREAM TOPICAL
Status: DISCONTINUED | OUTPATIENT
Start: 2023-03-09 | End: 2023-03-09 | Stop reason: HOSPADM

## 2023-03-09 RX ORDER — NALOXONE HYDROCHLORIDE 0.4 MG/ML
0.2 INJECTION, SOLUTION INTRAMUSCULAR; INTRAVENOUS; SUBCUTANEOUS
Status: DISCONTINUED | OUTPATIENT
Start: 2023-03-09 | End: 2023-03-09 | Stop reason: HOSPADM

## 2023-03-09 RX ORDER — FENTANYL CITRATE 50 UG/ML
50 INJECTION, SOLUTION INTRAMUSCULAR; INTRAVENOUS EVERY 5 MIN PRN
Status: DISCONTINUED | OUTPATIENT
Start: 2023-03-09 | End: 2023-03-09 | Stop reason: HOSPADM

## 2023-03-09 RX ORDER — IOPAMIDOL 755 MG/ML
INJECTION, SOLUTION INTRAVASCULAR PRN
Status: DISCONTINUED | OUTPATIENT
Start: 2023-03-09 | End: 2023-03-09 | Stop reason: HOSPADM

## 2023-03-09 RX ORDER — PROPOFOL 10 MG/ML
INJECTION, EMULSION INTRAVENOUS CONTINUOUS PRN
Status: DISCONTINUED | OUTPATIENT
Start: 2023-03-09 | End: 2023-03-09

## 2023-03-09 RX ORDER — NALOXONE HYDROCHLORIDE 0.4 MG/ML
0.4 INJECTION, SOLUTION INTRAMUSCULAR; INTRAVENOUS; SUBCUTANEOUS
Status: DISCONTINUED | OUTPATIENT
Start: 2023-03-09 | End: 2023-03-09 | Stop reason: HOSPADM

## 2023-03-09 RX ORDER — FENTANYL CITRATE 50 UG/ML
25 INJECTION, SOLUTION INTRAMUSCULAR; INTRAVENOUS EVERY 5 MIN PRN
Status: DISCONTINUED | OUTPATIENT
Start: 2023-03-09 | End: 2023-03-09 | Stop reason: HOSPADM

## 2023-03-09 RX ORDER — SODIUM CHLORIDE, SODIUM LACTATE, POTASSIUM CHLORIDE, CALCIUM CHLORIDE 600; 310; 30; 20 MG/100ML; MG/100ML; MG/100ML; MG/100ML
INJECTION, SOLUTION INTRAVENOUS CONTINUOUS PRN
Status: DISCONTINUED | OUTPATIENT
Start: 2023-03-09 | End: 2023-03-09

## 2023-03-09 RX ORDER — PROPOFOL 10 MG/ML
INJECTION, EMULSION INTRAVENOUS PRN
Status: DISCONTINUED | OUTPATIENT
Start: 2023-03-09 | End: 2023-03-09

## 2023-03-09 RX ORDER — PHENYLEPHRINE HYDROCHLORIDE 10 MG/ML
INJECTION INTRAVENOUS PRN
Status: DISCONTINUED | OUTPATIENT
Start: 2023-03-09 | End: 2023-03-09

## 2023-03-09 RX ORDER — FENTANYL CITRATE 0.05 MG/ML
INJECTION, SOLUTION INTRAMUSCULAR; INTRAVENOUS PRN
Status: DISCONTINUED | OUTPATIENT
Start: 2023-03-09 | End: 2023-03-09

## 2023-03-09 RX ORDER — CEFTRIAXONE SODIUM 2 G/50ML
2 INJECTION, SOLUTION INTRAVENOUS
Status: COMPLETED | OUTPATIENT
Start: 2023-03-09 | End: 2023-03-09

## 2023-03-09 RX ORDER — ACETAMINOPHEN 325 MG/1
975 TABLET ORAL EVERY 4 HOURS PRN
Status: DISCONTINUED | OUTPATIENT
Start: 2023-03-09 | End: 2023-03-09 | Stop reason: HOSPADM

## 2023-03-09 RX ADMIN — ACETAMINOPHEN 975 MG: 325 TABLET ORAL at 14:06

## 2023-03-09 RX ADMIN — PHENYLEPHRINE HYDROCHLORIDE 100 MCG: 10 INJECTION INTRAVENOUS at 12:20

## 2023-03-09 RX ADMIN — FENTANYL CITRATE 25 MCG: 50 INJECTION INTRAVENOUS at 12:25

## 2023-03-09 RX ADMIN — FENTANYL CITRATE 25 MCG: 50 INJECTION, SOLUTION INTRAMUSCULAR; INTRAVENOUS at 13:11

## 2023-03-09 RX ADMIN — SODIUM CHLORIDE: 9 INJECTION, SOLUTION INTRAVENOUS at 11:31

## 2023-03-09 RX ADMIN — FENTANYL CITRATE 25 MCG: 50 INJECTION, SOLUTION INTRAMUSCULAR; INTRAVENOUS at 13:06

## 2023-03-09 RX ADMIN — SODIUM CHLORIDE, SODIUM LACTATE, POTASSIUM CHLORIDE, AND CALCIUM CHLORIDE: 600; 310; 30; 20 INJECTION, SOLUTION INTRAVENOUS at 11:58

## 2023-03-09 RX ADMIN — CEFTRIAXONE SODIUM 2 G: 2 INJECTION, SOLUTION INTRAVENOUS at 11:39

## 2023-03-09 RX ADMIN — PROPOFOL 200 MCG/KG/MIN: 10 INJECTION, EMULSION INTRAVENOUS at 12:10

## 2023-03-09 RX ADMIN — SODIUM CHLORIDE, SODIUM LACTATE, POTASSIUM CHLORIDE, AND CALCIUM CHLORIDE: 600; 310; 30; 20 INJECTION, SOLUTION INTRAVENOUS at 12:38

## 2023-03-09 RX ADMIN — PROPOFOL 100 MG: 10 INJECTION, EMULSION INTRAVENOUS at 12:08

## 2023-03-09 RX ADMIN — PROPOFOL 100 MG: 10 INJECTION, EMULSION INTRAVENOUS at 12:09

## 2023-03-09 ASSESSMENT — ACTIVITIES OF DAILY LIVING (ADL)
ADLS_ACUITY_SCORE: 35
ADLS_ACUITY_SCORE: 35

## 2023-03-09 NOTE — DISCHARGE INSTRUCTIONS
.[facility]  Adult Discharge Orders & Instructions      For 24 hours after surgery:  Get plenty of rest.  A responsible adult must stay with you for at least 24 hours after you leave the hospital.   You may feel lightheaded.  IF so, sit for a few minutes before standing.  Have someone help you get up.   You may have a slight fever. Call the doctor if your fever is over 101 F (38.3 C) (taken under the tongue) or lasts longer than 24 hours.  You may have a dry mouth, a sore throat, muscle aches or trouble sleeping.  These should go away after 24 hours.  Do not make important or legal decisions.  6.   Do not drive or use heavy equipment.  If you have weakness or tingling, don't drive or use heavy equipment until this feeling goes away.                                                                                                                                                                         To contact a doctor, call    [phone]______________

## 2023-03-09 NOTE — OR NURSING
Trinh has been discharged to home at 1445 via  accompanied by spouse Chuck.     Written discharge instructions were provided to patient and spouse.  Prescriptions were picked up at [facility].      Patient and adult caring for them verbalize understanding of discharge instructions including no driving until tomorrow and no longer taking narcotic pain medications - no operating mechanical equipment and no making any important decisions.They understand reason for discharge, and necessary follow-up appointments.      Ice pack sent home with patient as well as discharge instructions.

## 2023-03-09 NOTE — OP NOTE
Preoperative diagnosis  Left ureteral stone    Postoperative diagnosis  Left ureteral stone    Procedure performed  Left ureteroscopy with holmium-YAG laser lithotripsy and basket extraction of stone fragments  Cystoscopy with left retrograde pyelogram and ureteral stent placement  Interpretation of retrograde    Surgeon and Assistants (if any)  Surgeon(s):  James Hanley MD  Nurse: Cecilia Quiles RN  Circulator: Sara Dumont RN  Scrub Person: Ana Serrano  First Assistant: Nancy Marcum RN    Specimen(s)  No    (EBL) Estimated blood loss (ml)  0    Anesthesia  General    Complications  None    Findings  Cystoscopy revealed no tumors, stones or other mucosal abnormalities.  Retrograde pyelogram revealed a delicate system with identification of the stones seen on imaging.    Indications  22 year old female agreed to undergo the above named procedure after discussion of the alternatives, risks and benefits.    Informed consent was obtained.      Procedure  The patient was taken to the operating room and placed supine on the operating table.  Pre-operative antibiotics were administered.  Bilateral lower extremity SCDs were placed.  After induction of general anesthesia the patient was positioned in dorsal lithotomy, prepped and draped in a sterile fashion.  A time-out was performed.      I passed a 14 Yoruba flexible cystoscope carefully via urethra into the bladder.  The left ureteral orifice was identified and a Sensor wire was passed retrograde to the level of the kidney and confirmed by fluoroscopy.  The flexible scope was off-loaded and the bladder emptied with a straight catheter.  An 8-10 coaxial dilator was passed without difficulty and then removed.  The MR6A semirigid ureteroscope was passed carefully along the Sensor wire through the urethra and into the distal ureter.  The stone was encountered and fragmented with a thulium laser fiber.  The fragments were basket extracted.  At the  completion of the procedure, all clinically significant fragments were removed from the ureter and only dust-like fragments remained other than one fragment that was visualized retrograde into the renal pelvis.  I performed a retrograde pyelogram through the semirigid scope then removed the scope over a Superstff wire which was passed to the level of the kidney confirmed by fluoroscopy.  The ureteroscope was withdrawn.      At this point we completed treatment of the ureteral stone and switched our attention to the moderate kidney stone burden in a completely different anatomic location requiring a completely different approach and different equipment..  For this a new and different set of disposable equipment was opened as well as the flexible ureteroscope.    The kidney was entered with the Olympus URF-P6 flexible ureteroscope.  The kidney fragment that moved retrograde into kidney was identified and basket extracted.    Ureteral edema but no obvious obstruction was present.  A 5-Luxembourger catheter was passed over the safety wire and the wire withdrawn.   Contrast was injected into the renal pelvis via the 5-Luxembourger open-ended catheter.  A super-stiff wire was placed and confirmed by fluoroscopy.  A 6 x 24 Luxembourger stent was positioned with the upper end in the upper pole and the lower in the bladder confirmed by fluoroscopy. The bladder was emptied and the procedure was complete. The patient tolerated the procedure well and was stable throughout    Plan  Follow up in clinic for stent pull as scheduled        Used ALARA with radiation given she is pregnant.  Total exposure was 2 single low dose spots, 0.22 mGray

## 2023-03-09 NOTE — ANESTHESIA PREPROCEDURE EVALUATION
Anesthesia Pre-Procedure Evaluation    Patient: Trinh Gonzales   MRN: [id number] : 2000        Procedure : Procedure(s):  Left ureteroscopy with laser lithotripsy and stent placement          Past Medical History:   Diagnosis Date     Adjustment disorder with depressed mood 2018     Adjustment disorder with mixed disturbance of emotions and conduct 2016     Anxiety 2019     Astigmatism 2010    Formatting of this note might be different from the original. IMO Update     Irregular menses 2022     Nephrolithiasis 2023    S/P Left ureteroscopy with laser lithotripsy and stent placement     PCOS (polycystic ovarian syndrome) 2022     Primary oligomenorrhea 2022     PTSD (post-traumatic stress disorder) 2018     Suicidal behavior 2016     Thelarche, premature 2008    Overview:  Asymmetric.  Slight nipple enlargement on left side.  Right side has breast tissue which is probably 3-4 cm. IMO Update 10/11  Formatting of this note might be different from the original. Asymmetric.  Slight nipple enlargement on left side.  Right side has breast tissue which is probably 3-4 cm. IMO Update 10/11      Past Surgical History:   Procedure Laterality Date     COMBINED CYSTOSCOPY, URETEROSCOPY, LASER HOLMIUM LITHOTRIPSY URETER(S) Left 2023    Procedure: Left ureteroscopy with laser lithotripsy and stent placement;  Surgeon: James Hanley MD;  Location: [facility] OR     WISDOM TOOTH EXTRACTION Bilateral       Allergies   Allergen Reactions     Apple      Gums/mouth gets itchy     Cherry      Gums/mouth gets itchy     Pistachios [Nuts]       Social History     Tobacco Use     Smoking status: Never     Smokeless tobacco: Never   Substance Use Topics     Alcohol use: No      Wt Readings from Last 1 Encounters:   23 49 kg (108 lb)        Anesthesia Evaluation   Pt has had prior anesthetic.         ROS/MED HX  ENT/Pulmonary:  - neg pulmonary ROS      Neurologic:  - neg neurologic ROS     Cardiovascular:  - neg cardiovascular ROS     METS/Exercise Tolerance: >4 METS    Hematologic:  - neg hematologic  ROS     Musculoskeletal:  - neg musculoskeletal ROS     GI/Hepatic:     (+) GERD, Asymptomatic on medication,     Renal/Genitourinary:     (+) Nephrolithiasis ,     Endo:  - neg endo ROS     Psychiatric/Substance Use:     (+) psychiatric history anxiety     Infectious Disease:  - neg infectious disease ROS     Malignancy:  - neg malignancy ROS     Other: Comment: Pt is 21 weeks pregnant.  She understands the risks of GA on pregnancy and wishes to proceed.     (+) Possibly pregnant, ,         Physical Exam    Airway  airway exam normal      Mallampati: II   TM distance: > 3 FB   Neck ROM: full   Mouth opening: > 3 cm    Respiratory Devices and Support         Dental       (+) Modest Abnormalities - crowns, retainers, 1 or 2 missing teeth      Cardiovascular   cardiovascular exam normal       Rhythm and rate: regular and normal     Pulmonary   pulmonary exam normal        breath sounds clear to auscultation           OUTSIDE LABS:  CBC:   Lab Results   Component Value Date    WBC 8.2 01/18/2023    WBC 5.4 01/12/2023    HGB 12.8 01/18/2023    HGB 12.3 01/12/2023    HCT 36.8 01/18/2023    HCT 35.7 01/12/2023     01/18/2023     01/12/2023     BMP:   Lab Results   Component Value Date     (L) 01/18/2023     08/06/2020    POTASSIUM 3.5 01/18/2023    POTASSIUM 3.8 08/06/2020    CHLORIDE 102 01/18/2023    CHLORIDE 109 08/06/2020    CO2 21 (L) 01/18/2023    CO2 26 08/06/2020    BUN 7.0 01/18/2023    BUN 10 08/06/2020    CR 0.47 (L) 01/18/2023    CR 0.68 08/06/2020    GLC 76 01/24/2023    GLC 89 01/18/2023     COAGS: No results found for: PTT, INR, FIBR  POC:   Lab Results   Component Value Date    HCG Negative 09/02/2021    HCGS Negative 06/15/2021     HEPATIC:   Lab Results   Component Value Date    ALBUMIN 4.2 08/06/2020    PROTTOTAL 7.7  08/06/2020    ALT 25 08/06/2020    AST 19 08/06/2020    ALKPHOS 72 08/06/2020    BILITOTAL 1.4 (H) 08/06/2020     OTHER:   Lab Results   Component Value Date    CLARITZA 9.3 01/18/2023    TSH 1.17 01/12/2023    T4 0.90 06/15/2021       Anesthesia Plan    ASA Status:  2   NPO Status:  NPO Appropriate    Anesthesia Type: General.     - Airway: LMA   Induction: Intravenous.   Maintenance: Balanced.        Consents    Anesthesia Plan(s) and associated risks, benefits, and realistic alternatives discussed. Questions answered and patient/representative(s) expressed understanding.     - Discussed: Risks, Benefits and Alternatives for BOTH SEDATION and the PROCEDURE were discussed     - Discussed with:  Patient, Spouse      - Extended Intubation/Ventilatory Support Discussed: No.      - Patient is DNR/DNI Status: No    Use of blood products discussed: No .     Postoperative Care    Pain management: Multi-modal analgesia.        Comments:    Other Comments: Risks, benefits and alternatives discussed and would like to proceed.  Todays FHR 150s per OB RN            RONEL Tafoya CRNA

## 2023-03-09 NOTE — OR NURSING
PACU Transfer Note    Trinh Gonzales was transferred to 730 via cart.  Equipment used for transport:  none.  Accompanied by:  PAWAN Parr  Prescriptions were: escribed    PACU Respiratory Event Documentation     1) Episodes of Apnea greater than or equal to 10 seconds: no    2) Bradypnea - less than 8 breaths per minute: no    3) Pain score on 0 to 10 scale: 4/10    4) Pain-sedation mismatch (yes or no): no    5) Repeated 02 desaturation less than 90% (yes or no): no    Anesthesia notified? (yes or no): no    Any of the above events occuring repeatedly in separate 30 minute intervals may be considered recurrent PACU respiratory events.    Patient stable and meets phase 1 discharge criteria for transport from PACU.    Handoff report given to [place] RN.

## 2023-03-09 NOTE — ANESTHESIA POSTPROCEDURE EVALUATION
Patient: Trinh Gonzales    Procedure: Procedure(s):  Left ureteroscopy with laser lithotripsy and stent placement       Anesthesia Type:  General    Note:  Disposition: Outpatient   Postop Pain Control: Uneventful            Sign Out: Well controlled pain   PONV: No   Neuro/Psych: Uneventful            Sign Out: Acceptable/Baseline neuro status   Airway/Respiratory: Uneventful            Sign Out: Acceptable/Baseline resp. status   CV/Hemodynamics: Uneventful            Sign Out: Acceptable CV status; No obvious hypovolemia; No obvious fluid overload   Other NRE: NONE   DID A NON-ROUTINE EVENT OCCUR? No           Last vitals:  Vitals Value Taken Time   BP 90/58 03/09/23 1320   Temp 96.6  F (35.9  C) 03/09/23 1311   Pulse 89 03/09/23 1323   Resp 0 03/09/23 1323   SpO2 99 % 03/09/23 1323   Vitals shown include unvalidated device data.    Electronically Signed By: RONEL PINO CRNA  March 9, 2023  4:36 PM

## 2023-03-10 ENCOUNTER — OFFICE VISIT (OUTPATIENT)
Dept: MATERNAL FETAL MEDICINE | Facility: CLINIC | Age: 23
End: 2023-03-10
Attending: ADVANCED PRACTICE MIDWIFE
Payer: COMMERCIAL

## 2023-03-10 ENCOUNTER — HOSPITAL ENCOUNTER (OUTPATIENT)
Dept: ULTRASOUND IMAGING | Facility: CLINIC | Age: 23
Discharge: HOME | End: 2023-03-10
Attending: ADVANCED PRACTICE MIDWIFE
Payer: COMMERCIAL

## 2023-03-10 DIAGNOSIS — O26.90 PREGNANCY RELATED CONDITION, ANTEPARTUM: ICD-10-CM

## 2023-03-10 DIAGNOSIS — O36.5920 POOR FETAL GROWTH AFFECTING MANAGEMENT OF MOTHER IN SECOND TRIMESTER, SINGLE OR UNSPECIFIED FETUS: Primary | ICD-10-CM

## 2023-03-10 DIAGNOSIS — N20.1 LEFT URETERAL STONE: Primary | ICD-10-CM

## 2023-03-10 PROCEDURE — 76811 OB US DETAILED SNGL FETUS: CPT

## 2023-03-10 PROCEDURE — 99203 OFFICE O/P NEW LOW 30 MIN: CPT | Mod: 25 | Performed by: OBSTETRICS & GYNECOLOGY

## 2023-03-10 PROCEDURE — 76811 OB US DETAILED SNGL FETUS: CPT | Mod: 26 | Performed by: OBSTETRICS & GYNECOLOGY

## 2023-03-10 PROCEDURE — 76820 UMBILICAL ARTERY ECHO: CPT | Mod: 26 | Performed by: OBSTETRICS & GYNECOLOGY

## 2023-03-10 NOTE — NURSING NOTE
Patient reports positive fetal movement, pain related to ureteral stent placed yesterday, no contractions, leaking of fluid, or bleeding. SBAR given to BOB KHALIL, see their note in Epic.

## 2023-03-10 NOTE — PROGRESS NOTES
Please see the imaging tab for details of the ultrasound performed today.    Nancy Dos Santos MD  Specialist in Maternal-Fetal Medicine

## 2023-03-16 NOTE — PROGRESS NOTES
Assessment & Plan     Adjustment disorder with depressed mood  Anxiety  Continue current treatment  She does have some increased stress and anxiety with pregnancy.  Baby is below 10% in length, but both parents are a little smaller/shorter.  She has also had kidney stones a couple of times during her pregnancy.  - escitalopram (LEXAPRO) 10 MG tablet; Take 1 tablet (10 mg) by mouth daily       See Patient Instructions    Return in about 1 year (around 3/17/2024) for anxiety, depression.    RONEL Garcia [facility] - SUHA Tsang is a 22 year old accompanied by her self, presenting for the following health issues:  Anxiety and Depression      HPI       Depression and Anxiety Follow-Up    How are you doing with your depression since your last visit? Improved     How are you doing with your anxiety since your last visit?  Improved     Are you having other symptoms that might be associated with depression or anxiety? No    Have you had a significant life event? No     Do you have any concerns with your use of alcohol or other drugs? No     Currently pregnant 22-23 weeks     Controlled with medication, but she does have some anxiety with pregnancy - baby is under 10% in length,  But both mom and dad are shorter      Social History     Tobacco Use     Smoking status: Never     Smokeless tobacco: Never   Vaping Use     Vaping Use: Never used   Substance Use Topics     Alcohol use: No     Drug use: No     PHQ 10/18/2021 1/12/2023 3/17/2023   PHQ-9 Total Score 1 0 6   Q9: Thoughts of better off dead/self-harm past 2 weeks Not at all Not at all Not at all     GLORIA-7 SCORE 11/15/2021 1/12/2023 3/17/2023   Total Score - - -   Total Score 1 2 4     Last PHQ-9 3/17/2023   1.  Little interest or pleasure in doing things 1   2.  Feeling down, depressed, or hopeless 1   3.  Trouble falling or staying asleep, or sleeping too much 1   4.  Feeling tired or having little energy 3   5.   Poor appetite or overeating 0   6.  Feeling bad about yourself 0   7.  Trouble concentrating 0   8.  Moving slowly or restless 0   Q9: Thoughts of better off dead/self-harm past 2 weeks 0   PHQ-9 Total Score 6   Difficulty at work, home, or with people Somewhat difficult     GLORIA-7  3/17/2023   1. Feeling nervous, anxious, or on edge 1   2. Not being able to stop or control worrying 1   3. Worrying too much about different things 1   4. Trouble relaxing 0   5. Being so restless that it is hard to sit still 0   6. Becoming easily annoyed or irritable 0   7. Feeling afraid, as if something awful might happen 1   GLORIA-7 Total Score 4   If you checked any problems, how difficult have they made it for you to do your work, take care of things at home, or get along with other people? Somewhat difficult       Suicide Assessment Five-step Evaluation and Treatment (SAFE-T)        Review of Systems   CONSTITUTIONAL: NEGATIVE for fever, chills, change in weight  INTEGUMENTARY/SKIN: eczema  RESP:some SOB with pregnancy and urinary stent   CV: NEGATIVE for chest pain, palpitations or peripheral edema  GI: NEGATIVE for nausea, abdominal pain, heartburn, or change in bowel habits  : history of kidney stone - decreased urination with stent in place   PSYCHIATRIC: HX anxiety      Objective    /70   Pulse 88   Temp 97.1  F (36.2  C) (Tympanic)   Wt 49.4 kg (109 lb)   SpO2 99%   BMI 22.02 kg/m    Body mass index is 22.02 kg/m .  Physical Exam   GENERAL: alert and no distress  NECK: no adenopathy, no asymmetry, masses, or scars and thyroid normal to palpation  RESP: lungs clear to auscultation - no rales, rhonchi or wheezes  CV: regular rate and rhythm, normal S1 S2, no S3 or S4, no murmur, click or rub, no peripheral edema and peripheral pulses strong  ABDOMEN: soft, nontender, no hepatosplenomegaly, no masses and bowel sounds normal  PSYCH: mentation appears normal, affect normal/bright

## 2023-03-17 ENCOUNTER — OFFICE VISIT (OUTPATIENT)
Dept: FAMILY MEDICINE | Facility: OTHER | Age: 23
End: 2023-03-17
Attending: NURSE PRACTITIONER
Payer: COMMERCIAL

## 2023-03-17 VITALS
HEART RATE: 88 BPM | BODY MASS INDEX: 22.02 KG/M2 | WEIGHT: 109 LBS | DIASTOLIC BLOOD PRESSURE: 70 MMHG | TEMPERATURE: 97.1 F | OXYGEN SATURATION: 99 % | SYSTOLIC BLOOD PRESSURE: 108 MMHG

## 2023-03-17 DIAGNOSIS — F41.9 ANXIETY: ICD-10-CM

## 2023-03-17 DIAGNOSIS — F43.21 ADJUSTMENT DISORDER WITH DEPRESSED MOOD: Primary | ICD-10-CM

## 2023-03-17 PROCEDURE — G0463 HOSPITAL OUTPT CLINIC VISIT: HCPCS

## 2023-03-17 PROCEDURE — G0463 HOSPITAL OUTPT CLINIC VISIT: HCPCS | Mod: 25

## 2023-03-17 PROCEDURE — 99213 OFFICE O/P EST LOW 20 MIN: CPT | Performed by: NURSE PRACTITIONER

## 2023-03-17 RX ORDER — ESCITALOPRAM OXALATE 10 MG/1
10 TABLET ORAL DAILY
Qty: 90 TABLET | Refills: 3 | Status: SHIPPED | OUTPATIENT
Start: 2023-03-17 | End: 2024-04-17

## 2023-03-17 ASSESSMENT — ANXIETY QUESTIONNAIRES
7. FEELING AFRAID AS IF SOMETHING AWFUL MIGHT HAPPEN: SEVERAL DAYS
3. WORRYING TOO MUCH ABOUT DIFFERENT THINGS: SEVERAL DAYS
1. FEELING NERVOUS, ANXIOUS, OR ON EDGE: SEVERAL DAYS
5. BEING SO RESTLESS THAT IT IS HARD TO SIT STILL: NOT AT ALL
GAD7 TOTAL SCORE: 4
2. NOT BEING ABLE TO STOP OR CONTROL WORRYING: SEVERAL DAYS
GAD7 TOTAL SCORE: 4
6. BECOMING EASILY ANNOYED OR IRRITABLE: NOT AT ALL
4. TROUBLE RELAXING: NOT AT ALL
IF YOU CHECKED OFF ANY PROBLEMS ON THIS QUESTIONNAIRE, HOW DIFFICULT HAVE THESE PROBLEMS MADE IT FOR YOU TO DO YOUR WORK, TAKE CARE OF THINGS AT HOME, OR GET ALONG WITH OTHER PEOPLE: SOMEWHAT DIFFICULT

## 2023-03-17 ASSESSMENT — PAIN SCALES - GENERAL: PAINLEVEL: NO PAIN (0)

## 2023-03-17 ASSESSMENT — PATIENT HEALTH QUESTIONNAIRE - PHQ9: SUM OF ALL RESPONSES TO PHQ QUESTIONS 1-9: 6

## 2023-03-17 NOTE — PATIENT INSTRUCTIONS
Continue current plan of care    Follow up yearly or as needed     Continue with counseling as needed

## 2023-03-30 ENCOUNTER — PRENATAL OFFICE VISIT (OUTPATIENT)
Dept: OBGYN | Facility: OTHER | Age: 23
End: 2023-03-30
Attending: OBSTETRICS & GYNECOLOGY
Payer: COMMERCIAL

## 2023-03-30 VITALS — WEIGHT: 110 LBS | SYSTOLIC BLOOD PRESSURE: 88 MMHG | DIASTOLIC BLOOD PRESSURE: 58 MMHG | BODY MASS INDEX: 22.22 KG/M2

## 2023-03-30 DIAGNOSIS — Z34.02 PREGNANCY, SUPERVISION OF FIRST, SECOND TRIMESTER: Primary | ICD-10-CM

## 2023-03-30 DIAGNOSIS — O36.5920 POOR FETAL GROWTH AFFECTING MANAGEMENT OF MOTHER IN SECOND TRIMESTER, SINGLE OR UNSPECIFIED FETUS: ICD-10-CM

## 2023-03-30 PROCEDURE — G0463 HOSPITAL OUTPT CLINIC VISIT: HCPCS

## 2023-03-30 PROCEDURE — 99207 PR COMPLICATED OB VISIT: CPT | Performed by: OBSTETRICS & GYNECOLOGY

## 2023-03-30 ASSESSMENT — PAIN SCALES - GENERAL: PAINLEVEL: NO PAIN (0)

## 2023-03-30 NOTE — PROGRESS NOTES
SUBJECTIVE  Trinh Gonzales is a 22 year old  with No LMP recorded. Patient is pregnant.. Estimated Date of Delivery: Jul 15, 2023. Gestational age is 24w5d. She presents for follow up OB visit.    She continues to have daily nausea but denies emesis.  She denies any difficulty urinating, dysuria, hematuria, or flank pain. She feels good fetal movement. She denies leaking of fluid, vaginal bleeding, cramping or pelvic pressure. She denies abnormal vaginal discharge, headache or vision changes, fever or chills. She denies anxiety or depression symptoms on Lexapro and Hydroxyzine.  She continues to take Metformin for polycystic ovarian syndrome.    OBJECTIVE  BP (!) 88/58   Wt 49.9 kg (110 lb)   BMI 22.22 kg/m      General:  Well-developed well-nourished gravid female in no apparent distress. Alert and oriented x3.  Abdomen: Soft, gravid, non tender, positive bowel sounds. Fundal height at umbilicus -1 cm. Fetal heart tones auscultated at 148.  Cervix: deferred  Extremities:  No clubbing, cyanosis, or edema. Nontender bilaterally.    DIAGNOSTICS  2023 OB ultrasound: 20 4/7 wk, 288 gm, 4%, cephalic presentation, anterior placenta, no placenta previa, normal BARBARA, normal fetal anatomy.    03/10/2023 OB level 2 ultrasound: 21 6/7 wk, 382 gm, 8%, breech presentation, anterior and fundal placenta, no placenta previa, normal BARBARA, fetal growth restriction, normal fetal anatomy.    ASSESSMENT / PLAN  1 Intrauterine pregnancy at 24w5d.  2 EDC set by 9 wk ultrasound  3 Polycystic ovarian syndrome  4 Depression and anxiety  5 Persistent nausea and vomiting in pregnancy  6 Kidney stones first trimester (S/P Left ureteroscopy with laser lithotripsy and stent placement 2023)  7 Intrauterine growth restriction    - Problem list reviewed and updated.  - Pregnancy weight gain has been 1.496 kg (3 lb 4.8 oz) to date, which is adequate.   - I reviewed the OB ultrasound with the patient.  - I recommend that the  patient have a Glucose screen, Treponema and CBC at 26-28 weeks.  - The patient scheduled for repeat level 2 ultrasound and growth on 2023 in [place].  - I briefly discussed intrauterine growth restriction with the patient.  I discussed the increased risk for  delivery as well as stillbirth.  - I will await [facility] recommendations on starting serial growth ultrasounds as well as  surveillance including umbilical artery Doppler.  - I discussed the possible need for delivery at a tertiary care center.  - I discussed use of Lexapro, Hydroxyzine, Metformin, and Zofran in pregnancy.  - I recommend the patient see mental health counselor for depression and anxiety follow-up.  - Symptomatic relief measures for nausea and vomiting in pregnancy reviewed the patient.  - I encourage patient follow-up with Urology as scheduled.  - I reviewed routine obstetrical precautions, recommendations and instructions with the patient including fetal movement and kick count instructions.  - I reviewed  labor precautions with the patient with instructions for the patient to come in for decreased fetal movement, suspected rupture of membranes, regular contraction pattern, vaginal bleeding or any other concerning symptoms.  - Follow up in 3-4 weeks.    Tai Hoang MD  Obstetrics and Gynecology

## 2023-03-31 ENCOUNTER — OFFICE VISIT (OUTPATIENT)
Dept: MATERNAL FETAL MEDICINE | Facility: HOSPITAL | Age: 23
End: 2023-03-31
Attending: OBSTETRICS & GYNECOLOGY
Payer: COMMERCIAL

## 2023-03-31 ENCOUNTER — ANCILLARY PROCEDURE (OUTPATIENT)
Dept: ULTRASOUND IMAGING | Facility: HOSPITAL | Age: 23
End: 2023-03-31
Attending: OBSTETRICS & GYNECOLOGY
Payer: COMMERCIAL

## 2023-03-31 VITALS — DIASTOLIC BLOOD PRESSURE: 71 MMHG | SYSTOLIC BLOOD PRESSURE: 104 MMHG

## 2023-03-31 DIAGNOSIS — O36.5990 FETAL GROWTH RESTRICTION ANTEPARTUM: Primary | ICD-10-CM

## 2023-03-31 DIAGNOSIS — O36.5920 POOR FETAL GROWTH AFFECTING MANAGEMENT OF MOTHER IN SECOND TRIMESTER, SINGLE OR UNSPECIFIED FETUS: ICD-10-CM

## 2023-03-31 PROCEDURE — 59025 FETAL NON-STRESS TEST: CPT

## 2023-03-31 PROCEDURE — 76820 UMBILICAL ARTERY ECHO: CPT

## 2023-03-31 PROCEDURE — 76820 UMBILICAL ARTERY ECHO: CPT | Mod: 26 | Performed by: OBSTETRICS & GYNECOLOGY

## 2023-03-31 PROCEDURE — 99214 OFFICE O/P EST MOD 30 MIN: CPT | Mod: 25 | Performed by: OBSTETRICS & GYNECOLOGY

## 2023-03-31 PROCEDURE — 59025 FETAL NON-STRESS TEST: CPT | Mod: 26 | Performed by: OBSTETRICS & GYNECOLOGY

## 2023-03-31 PROCEDURE — 76816 OB US FOLLOW-UP PER FETUS: CPT | Mod: 26 | Performed by: OBSTETRICS & GYNECOLOGY

## 2023-03-31 NOTE — NURSING NOTE
Patient reports positive fetal movement, denies pain, denies contractions, leaking of fluid, or bleeding. Patient denies headache or visual changes related to preeclampsia. NST Performed due to FGR.   reviewed efm tracing. See NST/BPP Doc Flowsheet tab.SBAR given to BOB KHALIL, see their note in Epic.

## 2023-03-31 NOTE — PROGRESS NOTES
"Please see \"Imaging\" tab under \"Chart Review\" for details of today's visit.    Gerri Waddell    "

## 2023-04-03 ENCOUNTER — MYC MEDICAL ADVICE (OUTPATIENT)
Dept: OBGYN | Facility: OTHER | Age: 23
End: 2023-04-03

## 2023-04-03 ENCOUNTER — TELEPHONE (OUTPATIENT)
Dept: OBGYN | Facility: OTHER | Age: 23
End: 2023-04-03

## 2023-04-18 ENCOUNTER — HOSPITAL ENCOUNTER (OUTPATIENT)
Dept: ULTRASOUND IMAGING | Facility: HOSPITAL | Age: 23
Discharge: HOME | End: 2023-04-18
Attending: OBSTETRICS & GYNECOLOGY
Payer: COMMERCIAL

## 2023-04-18 ENCOUNTER — PRENATAL OFFICE VISIT (OUTPATIENT)
Dept: OBGYN | Facility: OTHER | Age: 23
End: 2023-04-18
Attending: OBSTETRICS & GYNECOLOGY
Payer: COMMERCIAL

## 2023-04-18 VITALS — SYSTOLIC BLOOD PRESSURE: 92 MMHG | WEIGHT: 114 LBS | DIASTOLIC BLOOD PRESSURE: 60 MMHG | BODY MASS INDEX: 23.03 KG/M2

## 2023-04-18 DIAGNOSIS — O36.5920 POOR FETAL GROWTH AFFECTING MANAGEMENT OF MOTHER IN SECOND TRIMESTER, SINGLE OR UNSPECIFIED FETUS: ICD-10-CM

## 2023-04-18 DIAGNOSIS — Z34.02 PREGNANCY, SUPERVISION OF FIRST, SECOND TRIMESTER: Primary | ICD-10-CM

## 2023-04-18 DIAGNOSIS — Z34.02 PREGNANCY, SUPERVISION OF FIRST, SECOND TRIMESTER: ICD-10-CM

## 2023-04-18 PROCEDURE — G0463 HOSPITAL OUTPT CLINIC VISIT: HCPCS

## 2023-04-18 PROCEDURE — 76816 OB US FOLLOW-UP PER FETUS: CPT

## 2023-04-18 PROCEDURE — 99207 PR COMPLICATED OB VISIT: CPT | Performed by: OBSTETRICS & GYNECOLOGY

## 2023-04-18 PROCEDURE — 76820 UMBILICAL ARTERY ECHO: CPT

## 2023-04-18 ASSESSMENT — PAIN SCALES - GENERAL: PAINLEVEL: NO PAIN (0)

## 2023-04-18 NOTE — PROGRESS NOTES
SUBJECTIVE  Trinh Gonzales is a 22 year old  with No LMP recorded. Patient is pregnant.. Estimated Date of Delivery: Jul 15, 2023. Gestational age is 27w3d. She presents for follow up OB visit.    She feels good fetal movement. She denies leaking of fluid, vaginal bleeding, cramping or pelvic pressure. She denies abnormal vaginal discharge, difficulty urinating, headache or vision changes, fever or chills. She denies depression, or anxiety symptoms on Lexapro and Hydroxyzine.  She continues to have daily nausea without emesis.  She takes Metformin daily for polycystic ovarian syndrome.    OBJECTIVE  BP 92/60   Wt 51.7 kg (114 lb)   BMI 23.03 kg/m      General:  Well-developed well-nourished gravid female in no apparent distress. Alert and oriented x3.  Abdomen: Soft, gravid, non tender, positive bowel sounds. Fundal height at 22 cm. Fetal heart tones auscultated at 144.  Cervix: deferred  Extremities:  No clubbing, cyanosis, or edema. Nontender bilaterally.    DIAGNOSTICS  2023 OB ultrasound: 20 4/7 wk, 288 gm, 4%, cephalic presentation, anterior placenta, no placenta previa, normal BARBARA, normal fetal anatomy.     03/10/2023 OB level 2 ultrasound: 21 6/7 wk, 382 gm, 8%, breech presentation, anterior and fundal placenta, no placenta previa, normal BARBARA, fetal growth restriction, normal fetal anatomy.    2023 OB level 2 ultrasound: 24 6/7 wk, 598 gm, 5%, cephalic presentation, anterior and fundal placenta, no placenta previa, normal BARBARA, fetal growth restriction, normal fetal anatomy, umbilical artery doppler S/D ratio 1.36.    2023 OB ultrasound pending    ASSESSMENT / PLAN  1 Intrauterine pregnancy at 27w3d.  2 EDC set by 9 wk ultrasound  3 Polycystic ovarian syndrome  4 Depression and anxiety  5 Persistent nausea and vomiting in pregnancy  6 Kidney stones first trimester (S/P Left ureteroscopy with laser lithotripsy and stent placement 2023)  7 Fetal growth restriction    -  Problem list reviewed and updated.  - Pregnancy weight gain has been 3.31 kg (7 lb 4.8 oz) to date, which is adequate.   - I recommend that the patient have a Glucose screen, Treponema and CBC at 26-28 weeks.  - I discussed fetal growth restriction with the patient including increased risk for  delivery as well as stillbirth.  - [facility] recommends serial growth ultrasounds every 3-4 weeks.  - [facility] recommends weekly  testing and umbilical artery dopplers  - [facility] recommends IOL at 38-39 wk as long as NST remains reactive/ reassuring, UA Doppler remains in normal limits and interval growth is noted on follow up ultrasounds.  - I discussed the possible need for delivery at a tertiary care center.  - I discussed use of Lexapro, Hydroxyzine, Metformin, and Zofran in pregnancy.  - I recommend the patient see mental health counselor for depression and anxiety follow-up.  - Symptomatic relief measures for nausea and vomiting in pregnancy reviewed the patient.  - I encourage patient follow-up with Urology as scheduled.  - I reviewed routine obstetrical precautions, recommendations and instructions with the patient including fetal movement and kick count instructions.  - I reviewed  labor precautions with the patient with instructions for the patient to come in for decreased fetal movement, suspected rupture of membranes, regular contraction pattern, vaginal bleeding or any other concerning symptoms.  - Follow up in 1 weeks.    Tai Hoang MD  Obstetrics and Gynecology

## 2023-04-20 ENCOUNTER — LAB (OUTPATIENT)
Dept: LAB | Facility: OTHER | Age: 23
End: 2023-04-20
Payer: COMMERCIAL

## 2023-04-20 DIAGNOSIS — Z34.02 PREGNANCY, SUPERVISION OF FIRST, SECOND TRIMESTER: ICD-10-CM

## 2023-04-20 LAB
ERYTHROCYTE [DISTWIDTH] IN BLOOD BY AUTOMATED COUNT: 12.8 % (ref 10–15)
GLUCOSE 1H P 50 G GLC PO SERPL-MCNC: 175 MG/DL (ref 70–129)
HCT VFR BLD AUTO: 30.6 % (ref 35–47)
HGB BLD-MCNC: 10.1 G/DL (ref 11.7–15.7)
MCH RBC QN AUTO: 31.6 PG (ref 26.5–33)
MCHC RBC AUTO-ENTMCNC: 33 G/DL (ref 31.5–36.5)
MCV RBC AUTO: 96 FL (ref 78–100)
PLATELET # BLD AUTO: 221 10E3/UL (ref 150–450)
RBC # BLD AUTO: 3.2 10E6/UL (ref 3.8–5.2)
WBC # BLD AUTO: 7.4 10E3/UL (ref 4–11)

## 2023-04-20 PROCEDURE — 36415 COLL VENOUS BLD VENIPUNCTURE: CPT | Mod: ZL

## 2023-04-20 PROCEDURE — 36415 COLL VENOUS BLD VENIPUNCTURE: CPT | Performed by: OBSTETRICS & GYNECOLOGY

## 2023-04-20 PROCEDURE — 85027 COMPLETE CBC AUTOMATED: CPT | Mod: ZL

## 2023-04-20 PROCEDURE — 86780 TREPONEMA PALLIDUM: CPT | Performed by: OBSTETRICS & GYNECOLOGY

## 2023-04-20 PROCEDURE — 86780 TREPONEMA PALLIDUM: CPT | Mod: ZL

## 2023-04-20 PROCEDURE — 82950 GLUCOSE TEST: CPT | Mod: ZL

## 2023-04-20 PROCEDURE — 82950 GLUCOSE TEST: CPT | Performed by: OBSTETRICS & GYNECOLOGY

## 2023-04-20 PROCEDURE — 85027 COMPLETE CBC AUTOMATED: CPT | Performed by: OBSTETRICS & GYNECOLOGY

## 2023-04-22 LAB — T PALLIDUM AB SER QL: NONREACTIVE

## 2023-04-24 ENCOUNTER — HOSPITAL ENCOUNTER (OUTPATIENT)
Dept: ULTRASOUND IMAGING | Facility: HOSPITAL | Age: 23
Discharge: HOME | End: 2023-04-24
Attending: OBSTETRICS & GYNECOLOGY
Payer: COMMERCIAL

## 2023-04-24 ENCOUNTER — HOSPITAL ENCOUNTER (OUTPATIENT)
Facility: HOSPITAL | Age: 23
Discharge: HOME | End: 2023-04-24
Attending: OBSTETRICS & GYNECOLOGY | Admitting: OBSTETRICS & GYNECOLOGY
Payer: COMMERCIAL

## 2023-04-24 ENCOUNTER — PRENATAL OFFICE VISIT (OUTPATIENT)
Dept: OBGYN | Facility: OTHER | Age: 23
End: 2023-04-24
Attending: OBSTETRICS & GYNECOLOGY
Payer: COMMERCIAL

## 2023-04-24 VITALS
DIASTOLIC BLOOD PRESSURE: 62 MMHG | SYSTOLIC BLOOD PRESSURE: 100 MMHG | TEMPERATURE: 97.8 F | RESPIRATION RATE: 16 BRPM | OXYGEN SATURATION: 96 %

## 2023-04-24 VITALS — WEIGHT: 114 LBS | SYSTOLIC BLOOD PRESSURE: 90 MMHG | BODY MASS INDEX: 23.03 KG/M2 | DIASTOLIC BLOOD PRESSURE: 60 MMHG

## 2023-04-24 DIAGNOSIS — Z34.02 PREGNANCY, SUPERVISION OF FIRST, SECOND TRIMESTER: Primary | ICD-10-CM

## 2023-04-24 DIAGNOSIS — O36.5920 POOR FETAL GROWTH AFFECTING MANAGEMENT OF MOTHER IN SECOND TRIMESTER, SINGLE OR UNSPECIFIED FETUS: ICD-10-CM

## 2023-04-24 DIAGNOSIS — Z34.02 PREGNANCY, SUPERVISION OF FIRST, SECOND TRIMESTER: ICD-10-CM

## 2023-04-24 PROCEDURE — 59025 FETAL NON-STRESS TEST: CPT

## 2023-04-24 PROCEDURE — 59025 FETAL NON-STRESS TEST: CPT | Mod: 26 | Performed by: OBSTETRICS & GYNECOLOGY

## 2023-04-24 PROCEDURE — 76820 UMBILICAL ARTERY ECHO: CPT

## 2023-04-24 PROCEDURE — 99207 PR COMPLICATED OB VISIT: CPT | Performed by: OBSTETRICS & GYNECOLOGY

## 2023-04-24 PROCEDURE — G0463 HOSPITAL OUTPT CLINIC VISIT: HCPCS

## 2023-04-24 ASSESSMENT — PAIN SCALES - GENERAL: PAINLEVEL: NO PAIN (0)

## 2023-04-24 NOTE — PROGRESS NOTES
SUBJECTIVE  Trinh Gonzales is a 22 year old  with No LMP recorded. Patient is pregnant.. Estimated Date of Delivery: Jul 15, 2023. Gestational age is 28w2d. She presents for follow up OB visit.    She has no complaints. She feels good fetal movement. She denies leaking of fluid or vaginal bleeding. No cramping, contractions, or pelvic pressure. She denies abnormal vaginal discharge, difficulty urinating, fever or chills. No headache, vision changes, lower extremity swelling, upper abdominal pain, chest pain, or shortness of breath.  She denies anxiety or depression symptoms on Lexapro and Hydroxyzine.  She takes Metformin daily for polycystic ovarian syndrome.  She continues to have daily nausea without emesis.    OBJECTIVE  BP 90/60   Wt 51.7 kg (114 lb)   BMI 23.03 kg/m      General:  Well-developed well-nourished gravid female in no apparent distress. Alert and oriented x3.  Abdomen: Soft, gravid, non tender, positive bowel sounds. Fundal height at 23 cm. Fetal heart tones auscultated at 150.  Presentation noted to be breech by Leopold.  Cervix: deferred  Extremities:  No clubbing, cyanosis, or edema. Nontender bilaterally.    DIAGNOSTICS  2023 Glucose screen: 175, Hgb: 10.1, Hematocrit: 30.6, Plt: 221, Treponema: Negative    2023 OB ultrasound: 20 4/7 wk, 288 gm, 4%, cephalic presentation, anterior placenta, no placenta previa, normal BARBARA, normal fetal anatomy.  03/10/2023 OB level 2 ultrasound: 21 6/7 wk, 382 gm, 8%, breech presentation, anterior and fundal placenta, no placenta previa, normal BARBARA, fetal growth restriction, normal fetal anatomy.  2023 OB level 2 ultrasound: 24 6/7 wk, 598 gm, 5%, cephalic presentation, anterior and fundal placenta, no placenta previa, normal BARBARA, fetal growth restriction, normal fetal anatomy, umbilical artery doppler S/D ratio 1.36.  2023 OB ultrasound: 27 3/7 wk, 927 gm, 8.8%, cephalic presentation, anterior placenta, normal BARBARA,  appropriate interval fetal growth, umbilical doppler S/D ratio 2.4.  2023 Limited OB ultrasound: breech presentation, anterior placenta, heart rate 150 bpm, normal BARBARA, umbilical artery S/D ratio 2.5-3.1.    ASSESSMENT / PLAN  1 Intrauterine pregnancy at 28w2d.  2 EDC set by 9 wk ultrasound  3 Polycystic ovarian syndrome  4 Depression and anxiety  5 Persistent nausea and vomiting in pregnancy  6 Kidney stones first trimester (S/P Left ureteroscopy with laser lithotripsy and stent placement 2023)  7 Fetal growth restriction  8 Elevated glucose screen    - Problem list reviewed and updated.  - Pregnancy weight gain has been 3.31 kg (7 lb 4.8 oz) to date, which is adequate.  - Prenatal labs reviewed.  - OB ultrasound reviewed.  - The patient failed 1 hour Glucose screen. I discussed the need for a 3 hour GTT.  - I discussed fetal growth restriction with the patient including increased risk for  delivery as well as stillbirth.  - [facility] recommends serial growth ultrasounds every 3-4 weeks. Ordered.  - [facility] recommends weekly  testing with NST and umbilical artery dopplers. [facility]  - [facility] recommends IOL at 38-39 wk as long as NST remains reactive/ reassuring, UA Doppler remains in normal limits and interval growth is noted on follow up ultrasounds.  - I discussed the possible need for delivery at a tertiary care center.  - I discussed use of Lexapro, Hydroxyzine, Metformin, and Zofran in pregnancy.  - I recommend the patient see mental health counselor for depression and anxiety follow-up.  - Symptomatic relief measures for nausea and vomiting in pregnancy reviewed the patient.  - I encourage patient follow-up with Urology as scheduled.  - I reviewed routine obstetrical precautions, recommendations and instructions with the patient including fetal movement and kick count instructions.  - I reviewed depression precautions.  - I reviewed pre-eclampsia precautions with the  patient with instructions for the patient to come in for persistent headache unrelieved with Tylenol, blurry vision, right upper quadrant pain, shortness of breath, or significant persistent edema.  - I reviewed  labor precautions with the patient with instructions for the patient to come in for decreased fetal movement, suspected rupture of membranes, regular contraction pattern, vaginal bleeding or any other concerning symptoms.  - Follow up in 1 weeks.    Tai Hoang MD  Obstetrics and Gynecology

## 2023-04-24 NOTE — DISCHARGE INSTRUCTIONS

## 2023-04-24 NOTE — PLAN OF CARE
Trinh Gonzales        NST:  reactive  Start: 1003  Stop: 1038    Physician: Dr. Hoang  Reason For Test: IUGR  Estimated Date of Delivery: Jul 15, 2023   Gestational Age: 28w2d     Verified with second RN: Ashley Adkins, RN

## 2023-04-27 NOTE — PROGRESS NOTES
Name: Trinh Gonzales  Age: 22 year old  YOB: 2000   Medical Record #: [id number]     Fetal Non-Stress Test Results    NST Ordered By: Tai Hoang MD  NST Medical Indication: IUGR  Gestational Age: 28w2d       NST Start & Stop Times  NST Start Time: 1003  NST Stop Time: 1038    NST Results  Fetus A   Baseline Rate: 140  Variability: Present  Accelerations: Present  Decelerations: None  Interpretation: reactive and reassuring   Category:1            Tai Hoang MD  Obstetrics and Gynecology

## 2023-05-01 ENCOUNTER — HOSPITAL ENCOUNTER (OUTPATIENT)
Dept: ULTRASOUND IMAGING | Facility: HOSPITAL | Age: 23
Discharge: HOME | End: 2023-05-01
Attending: OBSTETRICS & GYNECOLOGY
Payer: COMMERCIAL

## 2023-05-01 ENCOUNTER — HOSPITAL ENCOUNTER (OUTPATIENT)
Facility: HOSPITAL | Age: 23
Discharge: HOME | End: 2023-05-01
Attending: OBSTETRICS & GYNECOLOGY | Admitting: OBSTETRICS & GYNECOLOGY
Payer: COMMERCIAL

## 2023-05-01 ENCOUNTER — PRENATAL OFFICE VISIT (OUTPATIENT)
Dept: OBGYN | Facility: OTHER | Age: 23
End: 2023-05-01
Attending: OBSTETRICS & GYNECOLOGY
Payer: COMMERCIAL

## 2023-05-01 VITALS
DIASTOLIC BLOOD PRESSURE: 65 MMHG | HEART RATE: 98 BPM | RESPIRATION RATE: 16 BRPM | TEMPERATURE: 98.2 F | SYSTOLIC BLOOD PRESSURE: 101 MMHG

## 2023-05-01 VITALS — WEIGHT: 116 LBS | BODY MASS INDEX: 23.43 KG/M2 | DIASTOLIC BLOOD PRESSURE: 62 MMHG | SYSTOLIC BLOOD PRESSURE: 90 MMHG

## 2023-05-01 DIAGNOSIS — Z34.02 PREGNANCY, SUPERVISION OF FIRST, SECOND TRIMESTER: ICD-10-CM

## 2023-05-01 DIAGNOSIS — Z34.03 PREGNANCY, SUPERVISION OF FIRST, THIRD TRIMESTER: Primary | ICD-10-CM

## 2023-05-01 DIAGNOSIS — O36.5920 POOR FETAL GROWTH AFFECTING MANAGEMENT OF MOTHER IN SECOND TRIMESTER, SINGLE OR UNSPECIFIED FETUS: ICD-10-CM

## 2023-05-01 PROCEDURE — G0463 HOSPITAL OUTPT CLINIC VISIT: HCPCS

## 2023-05-01 PROCEDURE — 76820 UMBILICAL ARTERY ECHO: CPT

## 2023-05-01 PROCEDURE — 59025 FETAL NON-STRESS TEST: CPT

## 2023-05-01 PROCEDURE — 99207 PR COMPLICATED OB VISIT: CPT | Performed by: OBSTETRICS & GYNECOLOGY

## 2023-05-01 ASSESSMENT — PAIN SCALES - GENERAL: PAINLEVEL: NO PAIN (0)

## 2023-05-01 NOTE — CARE PLAN
Trinh Gonzales NST for IUGR        NST:  reactive  Start: 1000  Stop: 1020     Physician: Viktor  Reason For Test: Suspected Interuterine Growth Retardation  Estimated Date of Delivery: Jul 15, 2023   Gestational Age: 29w2d     Verified with second RN: Chelita MANE RN    Comments: Scheduled for weeklyNST      Michelle Hoang RN

## 2023-05-01 NOTE — PROGRESS NOTES
SUBJECTIVE  Trinh Gonzales is a 22 year old  with No LMP recorded. Patient is pregnant.. Estimated Date of Delivery: Jul 15, 2023. Gestational age is 29w2d. She presents for follow up OB visit.    She is doing well. She denies cramping, contractions, leaking of fluid, or vaginal bleeding. She feels good fetal movement. She denies abnormal vaginal discharge, difficulty urinating, fever or chills. No headache, vision changes, lower extremity swelling, upper abdominal pain, chest pain, or shortness of breath.  She denies depression or anxiety symptoms on Lexapro and Hydroxyzine.  She continues with daily nausea but denies emesis.  She takes Metformin daily for polycystic ovarian syndrome.    OBJECTIVE  BP 90/62   Wt 52.6 kg (116 lb)   BMI 23.43 kg/m      General:  Well-developed well-nourished gravid female in no apparent distress. Alert and oriented x3.  Abdomen: Soft, gravid, non tender, positive bowel sounds. Fundal height at 24 cm. Fetal heart tones auscultated at 24.  Presentation breech by Leopold.  Cervix: deferred  Extremities:  No clubbing, cyanosis, or edema. Nontender bilaterally.    DIAGNOSTICS  2023 Glucose screen: 175, Hgb: 10.1, Hematocrit: 30.6, Plt: 221, Treponema: Negative     2023 OB ultrasound: 20 4/7 wk, 288 gm, 4%, cephalic presentation, anterior placenta, no placenta previa, normal BARBARA, normal fetal anatomy.  03/10/2023 OB level 2 ultrasound: 21 6/7 wk, 382 gm, 8%, breech presentation, anterior and fundal placenta, no placenta previa, normal BARBARA, fetal growth restriction, normal fetal anatomy.  2023 OB level 2 ultrasound: 24 6/7 wk, 598 gm, 5%, cephalic presentation, anterior and fundal placenta, no placenta previa, normal BARBARA, fetal growth restriction, normal fetal anatomy, umbilical artery doppler S/D ratio 1.36.  2023 OB ultrasound: 27 3/7 wk, 927 gm, 8.8%, cephalic presentation, anterior placenta, normal BARBARA, appropriate interval fetal growth, umbilical  doppler S/D ratio 2.4.  2023 Limited OB ultrasound: breech presentation, anterior placenta, heart rate 150 bpm, normal BARBARA, umbilical artery S/D ratio 2.5-3.1.  2023 OB ultrasound pending    ASSESSMENT / PLAN  1 Intrauterine pregnancy at 29w2d.  2 EDC set by 9 wk ultrasound  3 Polycystic ovarian syndrome  4 Depression and anxiety  5 Persistent nausea and vomiting in pregnancy  6 Kidney stones first trimester (S/P Left ureteroscopy with laser lithotripsy and stent placement 2023)  7 Fetal growth restriction  8 Elevated glucose screen    - Problem list reviewed and updated.  - Pregnancy weight gain has been 4.217 kg (9 lb 4.8 oz) to date, which is adequate.  - The patient failed 1 hour Glucose screen.  The patient has a 3 hour GTT scheduled for this week.  - I discussed fetal growth restriction with the patient including increased risk for  delivery as well as stillbirth.  - [facility] recommends serial growth ultrasounds every 3-4 weeks. Ordered.  - [facility] recommends weekly  testing with NST and umbilical artery dopplers. The patient is going to [facility] for NST.  - [facility] recommends IOL at 38-39 wk as long as NST remains reactive/ reassuring, UA Doppler remains in normal limits and interval growth is noted on follow up ultrasounds.  - I discussed the possible need for delivery at a tertiary care center.  - I discussed use of Lexapro, Hydroxyzine, Metformin, and Zofran in pregnancy.  - I recommend the patient see mental health counselor for depression and anxiety follow-up.  - I reviewed depression precautions.  - Symptomatic relief measures for nausea and vomiting in pregnancy reviewed the patient.  - I encourage patient follow-up with Urology as scheduled.  - I reviewed routine obstetrical precautions, recommendations and instructions with the patient including fetal movement and kick count instructions.  - I reviewed pre-eclampsia precautions with the patient with instructions for the patient to  come in for persistent headache unrelieved with Tylenol, blurry vision, right upper quadrant pain, shortness of breath, or significant persistent edema.  - I reviewed  labor precautions with the patient with instructions for the patient to come in for decreased fetal movement, suspected rupture of membranes, regular contraction pattern, vaginal bleeding or any other concerning symptoms.  - Follow up in 1 weeks.    Tai Hoang MD  Obstetrics and Gynecology

## 2023-05-04 ENCOUNTER — LAB (OUTPATIENT)
Dept: LAB | Facility: OTHER | Age: 23
End: 2023-05-04
Payer: COMMERCIAL

## 2023-05-04 DIAGNOSIS — Z34.02 PREGNANCY, SUPERVISION OF FIRST, SECOND TRIMESTER: ICD-10-CM

## 2023-05-04 LAB
GESTATIONAL GTT 1 HR POST DOSE: 161 MG/DL (ref 60–179)
GESTATIONAL GTT 2 HR POST DOSE: 127 MG/DL (ref 60–154)
GESTATIONAL GTT 3 HR POST DOSE: 125 MG/DL (ref 60–139)
GLUCOSE P FAST SERPL-MCNC: 81 MG/DL (ref 60–94)
HOLD SPECIMEN: NORMAL

## 2023-05-04 PROCEDURE — 82950 GLUCOSE TEST: CPT | Mod: ZL

## 2023-05-04 PROCEDURE — 36415 COLL VENOUS BLD VENIPUNCTURE: CPT | Performed by: OBSTETRICS & GYNECOLOGY

## 2023-05-04 PROCEDURE — 82952 GTT-ADDED SAMPLES: CPT | Performed by: OBSTETRICS & GYNECOLOGY

## 2023-05-04 PROCEDURE — 82951 GLUCOSE TOLERANCE TEST (GTT): CPT | Performed by: OBSTETRICS & GYNECOLOGY

## 2023-05-04 PROCEDURE — 82952 GTT-ADDED SAMPLES: CPT | Mod: ZL

## 2023-05-04 PROCEDURE — 82951 GLUCOSE TOLERANCE TEST (GTT): CPT | Mod: ZL

## 2023-05-04 PROCEDURE — 36415 COLL VENOUS BLD VENIPUNCTURE: CPT | Mod: ZL

## 2023-05-05 NOTE — PROGRESS NOTES
Name: Trinh Gonzales  Age: 22 year old  YOB: 2000   Medical Record #: [id number]     Fetal Non-Stress Test Results    NST Ordered By: Tai Hoang MD     Gestational Age: 29w2d       NST Start & Stop Times  Start: 1000  Stop: 1020    NST Results  Fetus A   Baseline Rate: 140  Variability: Present  Accelerations: Present  Decelerations: None  Interpretation: reactive and reassuring   Category:1            Tai Hoang MD  Obstetrics and Gynecology

## 2023-05-08 ENCOUNTER — HOSPITAL ENCOUNTER (OUTPATIENT)
Dept: ULTRASOUND IMAGING | Facility: HOSPITAL | Age: 23
Discharge: HOME | End: 2023-05-08
Attending: OBSTETRICS & GYNECOLOGY | Admitting: OBSTETRICS & GYNECOLOGY
Payer: COMMERCIAL

## 2023-05-08 ENCOUNTER — HOSPITAL ENCOUNTER (OUTPATIENT)
Facility: HOSPITAL | Age: 23
Discharge: HOME | End: 2023-05-08
Attending: OBSTETRICS & GYNECOLOGY | Admitting: OBSTETRICS & GYNECOLOGY
Payer: COMMERCIAL

## 2023-05-08 VITALS
TEMPERATURE: 97.4 F | SYSTOLIC BLOOD PRESSURE: 99 MMHG | OXYGEN SATURATION: 100 % | DIASTOLIC BLOOD PRESSURE: 63 MMHG | RESPIRATION RATE: 18 BRPM

## 2023-05-08 DIAGNOSIS — Z34.03 PREGNANCY, SUPERVISION OF FIRST, THIRD TRIMESTER: ICD-10-CM

## 2023-05-08 DIAGNOSIS — O36.5920 POOR FETAL GROWTH AFFECTING MANAGEMENT OF MOTHER IN SECOND TRIMESTER, SINGLE OR UNSPECIFIED FETUS: ICD-10-CM

## 2023-05-08 DIAGNOSIS — Z34.02 PREGNANCY, SUPERVISION OF FIRST, SECOND TRIMESTER: ICD-10-CM

## 2023-05-08 DIAGNOSIS — O36.5920 POOR FETAL GROWTH AFFECTING MANAGEMENT OF MOTHER IN SECOND TRIMESTER, SINGLE OR UNSPECIFIED FETUS: Primary | ICD-10-CM

## 2023-05-08 PROCEDURE — 76820 UMBILICAL ARTERY ECHO: CPT

## 2023-05-08 PROCEDURE — 59025 FETAL NON-STRESS TEST: CPT | Mod: 59

## 2023-05-08 NOTE — PROGRESS NOTES
Trinh Gonzales    NST:  reactive  Start: 1010  Stop: 1031    Physician: Dr. Hoang  Reason For Test: IUGR  Estimated Date of Delivery: Jul 15, 2023   Gestational Age: 30w2d     Verified with second RN: Nellie VILLALTA    Comments: Pt arrived, educated on procedure, VSS.  MD called with reactive NST and approved discharge to home.  AVS given and signed.      Geetha Garcia RN

## 2023-05-08 NOTE — DISCHARGE INSTRUCTIONS

## 2023-05-11 NOTE — PROGRESS NOTES
Name: Trinh Gonzales  Age: 22 year old  YOB: 2000   Medical Record #: [id number]     Fetal Non-Stress Test Results    NST Ordered By: Tai Hoang MD  NST Medical Indication: IUGR  Gestational Age: 30w2d       NST Start & Stop Times  NST Start Time: 1010  NST Stop Time: 1031    NST Results  Fetus A   Baseline Rate: 140  Variability: Present   Accelerations: Present  Decelerations: None  Interpretation: reactive and reassuring   Category:1            Tai Hoang MD  Obstetrics and Gynecology

## 2023-05-15 ENCOUNTER — HOSPITAL ENCOUNTER (OUTPATIENT)
Dept: ULTRASOUND IMAGING | Facility: HOSPITAL | Age: 23
Discharge: HOME | End: 2023-05-15
Attending: OBSTETRICS & GYNECOLOGY
Payer: COMMERCIAL

## 2023-05-15 ENCOUNTER — PRENATAL OFFICE VISIT (OUTPATIENT)
Dept: OBGYN | Facility: OTHER | Age: 23
End: 2023-05-15
Attending: OBSTETRICS & GYNECOLOGY
Payer: COMMERCIAL

## 2023-05-15 ENCOUNTER — HOSPITAL ENCOUNTER (OUTPATIENT)
Facility: HOSPITAL | Age: 23
Discharge: HOME | End: 2023-05-15
Attending: OBSTETRICS & GYNECOLOGY | Admitting: OBSTETRICS & GYNECOLOGY
Payer: COMMERCIAL

## 2023-05-15 VITALS
BODY MASS INDEX: 26.61 KG/M2 | HEIGHT: 55 IN | HEART RATE: 88 BPM | SYSTOLIC BLOOD PRESSURE: 100 MMHG | OXYGEN SATURATION: 100 % | DIASTOLIC BLOOD PRESSURE: 60 MMHG | WEIGHT: 115 LBS

## 2023-05-15 VITALS
WEIGHT: 116 LBS | HEART RATE: 120 BPM | BODY MASS INDEX: 23.39 KG/M2 | SYSTOLIC BLOOD PRESSURE: 99 MMHG | DIASTOLIC BLOOD PRESSURE: 63 MMHG | RESPIRATION RATE: 18 BRPM | OXYGEN SATURATION: 96 % | HEIGHT: 59 IN | TEMPERATURE: 97.9 F

## 2023-05-15 DIAGNOSIS — O36.5920 POOR FETAL GROWTH AFFECTING MANAGEMENT OF MOTHER IN SECOND TRIMESTER, SINGLE OR UNSPECIFIED FETUS: ICD-10-CM

## 2023-05-15 DIAGNOSIS — Z34.03 PREGNANCY, SUPERVISION OF FIRST, THIRD TRIMESTER: ICD-10-CM

## 2023-05-15 DIAGNOSIS — Z34.02 PREGNANCY, SUPERVISION OF FIRST, SECOND TRIMESTER: ICD-10-CM

## 2023-05-15 PROCEDURE — 76820 UMBILICAL ARTERY ECHO: CPT

## 2023-05-15 PROCEDURE — 76816 OB US FOLLOW-UP PER FETUS: CPT

## 2023-05-15 PROCEDURE — 99207 PR COMPLICATED OB VISIT: CPT | Performed by: OBSTETRICS & GYNECOLOGY

## 2023-05-15 PROCEDURE — G0463 HOSPITAL OUTPT CLINIC VISIT: HCPCS

## 2023-05-15 PROCEDURE — 59025 FETAL NON-STRESS TEST: CPT

## 2023-05-15 PROCEDURE — 59025 FETAL NON-STRESS TEST: CPT | Performed by: OBSTETRICS & GYNECOLOGY

## 2023-05-15 ASSESSMENT — ACTIVITIES OF DAILY LIVING (ADL): ADLS_ACUITY_SCORE: 31

## 2023-05-15 ASSESSMENT — PAIN SCALES - GENERAL: PAINLEVEL: NO PAIN (0)

## 2023-05-15 NOTE — DISCHARGE INSTRUCTIONS
Discharge Instructions for Undelivered Patients    Diet:  * Drink 8 to 12 glasses of liquids (milk, juice, water) every day  * You may eat meals and snacks.    Activity:  * Count fetal kicks every day.  * Call your doctor if your baby is moving less than usual.    Call your provider if you notice:  * Swelling in your face or increased swelling in your hands or legs.  * Headaches that are not relieved by Tylenol (acetaminophen).  * Changes in your vision (blurring; seeing spots or stars).  * Nausea (sick to your stomach) and vomiting (throwing up).  * Weight gain of 5 pounds per week.  * Heartburn that doesn't go away.  * Signs of bladder infection: Pain when you urinate (use the toilet), needing to go more often or more urgently.  * The bag of swenson (membrane) breaks, or you notice leaking in your underwear.  * Bright red blood in your underwear.  * Abdominal (lower belly) or stomach pain.  * For first baby: Contractions (tightenings) less than 5 minutes apart for (greater than 37 weeks) one hour or more.Less than 37 weeks call if more than 4 in one hour.  * Increase or change in vaginal discharge (note the color and amount).      [facility]: [phone]

## 2023-05-15 NOTE — PLAN OF CARE
Trinh Gonzales           NST:  reactive  Start: 0959  Stop: 1043     Physician: Dr. Hoang  Reason For Test: IUGR  Estimated Date of Delivery: Jul 15, 2023   Gestational Age: 31w2d      Verified with second RN: PAWAN Moore and PAWAN Ramirez     Comments:  Pt here for NST for IUGR. NST reactive after extended time due to fetal movement and broken strip. Dr. Hoang aware and okay with discharge.     AVS reviewed with Pt. Pt verbalized understanding and questions were answered.       Carolina Villegas RN

## 2023-05-15 NOTE — PLAN OF CARE
Trinh Gonzales        NST:  reactive  Start: 0959  Stop: 1043    Physician: Dr. Hoang  Reason For Test: IUGR  Estimated Date of Delivery: Jul 15, 2023   Gestational Age: 31w2d     Verified with second RN: PAWAN Moore and PAWAN Ramirez    Comments:  Pt here for NST for IUGR. NST reactive after extended time due to fetal movement and broken strip. Dr. Hoang aware and okay with discharge.      Carolina Villegas, RN

## 2023-05-15 NOTE — PROGRESS NOTES
"SUBJECTIVE  Trinh Gonzales is a 22 year old  with No LMP recorded. Patient is pregnant.. Estimated Date of Delivery: Jul 15, 2023. Gestational age is 31w2d. She presents for follow up OB visit.    She feels good fetal movement. She denies leaking of fluid or vaginal bleeding. No cramping, contractions, or pelvic pressure. She denies abnormal vaginal discharge, difficulty urinating, fever or chills. No headache, vision changes, lower extremity swelling, upper abdominal pain, chest pain, or shortness of breath.  She denies anxiety or depression symptoms on Hydroxyzine and Lexapro.  Her nausea and vomiting symptoms have now resolved.  She takes Metformin daily for polycystic ovarian syndrome.    OBJECTIVE  /60 (BP Location: Left arm, Patient Position: Chair, Cuff Size: Adult Regular)   Pulse 88   Ht 1.245 m (4' 1\")   Wt 52.2 kg (115 lb)   SpO2 100%   BMI 33.68 kg/m      General:  Well-developed well-nourished gravid female in no apparent distress. Alert and oriented x3.  Abdomen: Soft, gravid, non tender, positive bowel sounds. Fundal height at 26 cm. Fetal heart tones auscultated at 138.  Presentation noted to be cephalic by Leopold.  Cervix: deferred  Extremities:  No clubbing, cyanosis, or edema. Nontender bilaterally.    DIAGNOSTICS  2023 Glucose screen: 175, Hgb: 10.1, Hematocrit: 30.6, Plt: 221, Treponema: Negative  2023 3 hour GTT 81, 161, 127, 125    2023 OB ultrasound: 20 4/7 wk, 288 gm, 4%, cephalic presentation, anterior placenta, no placenta previa, normal BARBARA, normal fetal anatomy.  03/10/2023 OB level 2 ultrasound: 21 6/7 wk, 382 gm, 8%, breech presentation, anterior and fundal placenta, no placenta previa, normal BARBARA, fetal growth restriction, normal fetal anatomy.  2023 OB level 2 ultrasound: 24 6/7 wk, 598 gm, 5%, cephalic presentation, anterior and fundal placenta, no placenta previa, normal BARBARA, fetal growth restriction, normal fetal anatomy, umbilical " artery doppler S/D ratio 1.36.  2023 OB ultrasound: 27 3/7 wk, 927 gm, 8.8%, cephalic presentation, anterior placenta, normal BARBARA, appropriate interval fetal growth, umbilical doppler S/D ratio 2.4.  2023 Limited OB ultrasound: breech presentation, anterior placenta, heart rate 150 bpm, normal BARBARA, umbilical artery S/D ratio 2.5-3.1.  2023 Limited OB ultrasound: cephalic presentation, heart rate 145 bpm, normal BARBARA, umbilical artery doppler 3.0-3.4. Normal umbilical artery doppler  2023 Limited OB ultrasound: cephalic presentation, anterior placenta, heart rate 150 bpm, normal BARBARA, normal umbilical artery Doppler with S/D ratio 2.4  05/15/2023 OB ultrasound: pending    ASSESSMENT / PLAN  1 Intrauterine pregnancy at 31w2d.  2 EDC set by 9 wk ultrasound  3 Polycystic ovarian syndrome  4 Depression and anxiety  5 Kidney stones first trimester (S/P Left ureteroscopy with laser lithotripsy and stent placement 2023)  6 Fetal growth restriction  7 Elevated glucose screen with normal 3 hour GTT    - Problem list reviewed and updated.  - Pregnancy weight gain has been 3.764 kg (8 lb 4.8 oz) to date, which is adequate.  - The patient failed 1 hour Glucose screen and her 3 hour GTT is normal.  - I discussed fetal growth restriction with the patient including increased risk for  delivery as well as stillbirth.  - [facility] recommends serial growth ultrasounds every 3-4 weeks. Ordered.  - [facility] recommends weekly  testing with NST and umbilical artery dopplers. The patient is going to [facility] for NST.  - [facility] recommends IOL at 38-39 wk as long as NST remains reactive/ reassuring, UA Doppler remains in normal limits and interval growth is noted on follow up ultrasounds.  - I discussed the possible need for delivery at a tertiary care center.  - I discussed use of Lexapro, Hydroxyzine, and Metformin in pregnancy.  - I recommend the patient see mental health counselor for depression and anxiety  follow-up.  - I reviewed depression precautions.  - I encouraged patient to follow-up with Urology as scheduled.  - I reviewed routine obstetrical precautions, recommendations and instructions with the patient including fetal movement and kick count instructions.  - I reviewed pre-eclampsia precautions with the patient with instructions for the patient to come in for persistent headache unrelieved with Tylenol, blurry vision, right upper quadrant pain, shortness of breath, or significant persistent edema.  - I reviewed  labor precautions with the patient with instructions for the patient to come in for decreased fetal movement, suspected rupture of membranes, regular contraction pattern, vaginal bleeding or any other concerning symptoms.  - Follow up in 2 weeks.    Tai Hoang MD  Obstetrics and Gynecology

## 2023-05-18 NOTE — PROGRESS NOTES
Name: Trinh Gonzales  Age: 22 year old  YOB: 2000   Medical Record #: [id number]     Fetal Non-Stress Test Results    NST Ordered By: Tai Hoang MD     Gestational Age: 31w2d       NST Start & Stop Times  Start: 959  Stop: 3     NST Results  Fetus A   Baseline Rate: 140  Variability: Present  Accelerations: Present  Decelerations: None  Interpretation: reactive and reassuring after extended time due to fetal movement and broken strip  Category:1            Tai Hoang MD  Obstetrics and Gynecology

## 2023-05-22 ENCOUNTER — HOSPITAL ENCOUNTER (OUTPATIENT)
Dept: ULTRASOUND IMAGING | Facility: HOSPITAL | Age: 23
Discharge: HOME | End: 2023-05-22
Attending: OBSTETRICS & GYNECOLOGY | Admitting: OBSTETRICS & GYNECOLOGY
Payer: COMMERCIAL

## 2023-05-22 ENCOUNTER — HOSPITAL ENCOUNTER (OUTPATIENT)
Facility: HOSPITAL | Age: 23
Discharge: HOME | End: 2023-05-22
Attending: OBSTETRICS & GYNECOLOGY | Admitting: OBSTETRICS & GYNECOLOGY
Payer: COMMERCIAL

## 2023-05-22 VITALS
HEART RATE: 110 BPM | TEMPERATURE: 97.9 F | RESPIRATION RATE: 16 BRPM | SYSTOLIC BLOOD PRESSURE: 99 MMHG | DIASTOLIC BLOOD PRESSURE: 62 MMHG | OXYGEN SATURATION: 99 %

## 2023-05-22 DIAGNOSIS — Z34.02 PREGNANCY, SUPERVISION OF FIRST, SECOND TRIMESTER: ICD-10-CM

## 2023-05-22 DIAGNOSIS — O36.5920 POOR FETAL GROWTH AFFECTING MANAGEMENT OF MOTHER IN SECOND TRIMESTER, SINGLE OR UNSPECIFIED FETUS: ICD-10-CM

## 2023-05-22 PROCEDURE — 59025 FETAL NON-STRESS TEST: CPT | Mod: 59

## 2023-05-22 PROCEDURE — 76820 UMBILICAL ARTERY ECHO: CPT

## 2023-05-22 ASSESSMENT — ACTIVITIES OF DAILY LIVING (ADL): ADLS_ACUITY_SCORE: 35

## 2023-05-22 NOTE — CARE PLAN
Trinh Gonzales        NST:  reactive  Start: 1250  Stop: 1330    Physician: Dr. Hoang  Reason For Test: IUGR  Estimated Date of Delivery: Jul 15, 2023   Gestational Age: 32w2d     Verified with second RN: Felicia Montenegro    Comments:  Pt here for weekly NST for IUGR. Educated on test. Placed on monitor and VSS. NST reactive and Dr. Hoang notified.Order to discharge home.       Ashley Fuller, RN

## 2023-05-22 NOTE — DISCHARGE INSTRUCTIONS
Discharge Instruction for Undelivered Patients      You were seen for: Fetal Assessment  We Consulted: Dr. Hoang  You had (Test or Medicine):Non-Stress Test     Diet:   Resume diet as before     Activity:  Call your doctor or nurse midwife if your baby is moving less than usual.     Call your provider if you notice:  Swelling in your face or increased swelling in your hands or legs.  Headaches that are not relieved by Tylenol (acetaminophen).  Changes in your vision (blurring: seeing spots or stars.)  Nausea (sick to your stomach) and vomiting (throwing up).   Weight gain of 5 pounds or more per week.  Heartburn that doesn't go away.  Signs of bladder infection: pain when you urinate (use the toilet), need to go more often and more urgently.  The bag of swenson (rupture of membranes) breaks, or you notice leaking in your underwear.  Bright red blood in your underwear.  Abdominal (lower belly) or stomach pain.  For first baby: Contractions (tightening) less than 5 minutes apart for one hour or more.  Second (plus) baby: Contractions (tightening) less than 10 minutes apart and getting stronger.  *If less than 34 weeks: Contractions (tightening) more than 6 times in one hour.  Increase or change in vaginal discharge (note the color and amount)  Other:     Follow-up:  As scheduled in the clinic

## 2023-05-29 ENCOUNTER — HOSPITAL ENCOUNTER (OUTPATIENT)
Facility: HOSPITAL | Age: 23
Discharge: HOME | End: 2023-05-29
Attending: OBSTETRICS & GYNECOLOGY | Admitting: OBSTETRICS & GYNECOLOGY
Payer: COMMERCIAL

## 2023-05-29 VITALS
TEMPERATURE: 98.4 F | OXYGEN SATURATION: 98 % | BODY MASS INDEX: 22.98 KG/M2 | HEIGHT: 59 IN | WEIGHT: 114 LBS | RESPIRATION RATE: 16 BRPM

## 2023-05-29 PROCEDURE — 99213 OFFICE O/P EST LOW 20 MIN: CPT | Performed by: OBSTETRICS & GYNECOLOGY

## 2023-05-29 PROCEDURE — 59025 FETAL NON-STRESS TEST: CPT | Mod: 59

## 2023-05-29 NOTE — PROGRESS NOTES
Labor and Delivery OB Triage Progress Note  Scheduled NST for IUGR    Name: Trinh Gonzales  Age: 22 year old  YOB: 2000   Medical Record #: [id number]     Date of Admission:  2023  Admitting Service:  Obstetrics and Gynecology  Primary Provider:   Brandi Nolan    DATE: 2023         Identification:   Trinh Gonzales is a 22 year old  female at 33w2d with Estimated Date of Delivery: Jul 15, 2023         Chief Complaint:   She presents to Labor and Delivery unit for scheduled  testing.         History of Present Illness:   The OB prenatal record was reviewed with the patient. Briefly, this patient's pregnancy is complicated by IUGR, PCOS on metformin, depression/anxiety on Lexapro and hydroxyzine. She has also had kidney stones early in pregnancy.    She complains of worsening sciatica, running out of room in her ribs. The pregnancy is getting hard. Concerned about femur length and what this means for her baby.     Fetal Movement: Present and active.    Leakage of Fluid: Absent.    Vaginal Bleeding: Absent.    Contractions: Absent.    Preeclampsia Signs / Symptoms: Absent.    She denies abnormal vaginal discharge, itching or irritation. No difficulty urinating, dysuria, hematuria, or flank pain. Denies nausea or vomiting. No fever or chills. She denies headache, vision changes, lower extremity swelling, upper abdominal pain, chest pain, or shortness of breath.  She denies depression symptoms.         Review of Systems:   As per the HPI. Other systems are reviewed and negative.         Allergies:     Allergies   Allergen Reactions     Apple Juice      Gums/mouth gets itchy  Regular apples     Cherry      Gums/mouth gets itchy     Pistachios [Nuts]             Medication Prior to Admission:     Medications Prior to Admission   Medication Sig Dispense Refill Last Dose     acetaminophen (TYLENOL) 500 MG tablet Take 500-1,000 mg by mouth every 6 hours as needed    More than a month     albuterol (PROAIR HFA/PROVENTIL HFA/VENTOLIN HFA) 108 (90 Base) MCG/ACT inhaler Inhale 2 puffs into the lungs every 4 hours as needed   More than a month     escitalopram (LEXAPRO) 10 MG tablet Take 1 tablet (10 mg) by mouth daily 90 tablet 3 5/28/2023 at 1200     ondansetron (ZOFRAN ODT) 4 MG ODT tab Take 1 tablet (4 mg) by mouth every 8 hours as needed for nausea or vomiting 50 tablet 3 Past Month     phenazopyridine (PYRIDIUM) 200 MG tablet Take 1 tablet (200 mg) by mouth 3 times daily as needed for irritation 6 tablet 0 More than a month     Prenatal MV & Min w/FA-DHA (PRENATAL GUMMIES) 0.18-25 MG CHEW    5/28/2023 at 1200     metFORMIN (GLUCOPHAGE) 500 MG tablet TAKE 1 TABLET(500 MG) BY MOUTH TWICE DAILY WITH MEALS (Patient taking differently: 1,000 mg daily (with dinner)) 60 tablet 3             Active Problem List:     Patient Active Problem List   Diagnosis     Adjustment disorder with depressed mood     PTSD (post-traumatic stress disorder)     Suicidal behavior     Adjustment disorder with mixed disturbance of emotions and conduct     Anxiety     Astigmatism     PCOS (polycystic ovarian syndrome)     Pregnancy, supervision of first, third trimester     Nausea and vomiting during pregnancy     Poor fetal growth affecting management of mother in second trimester          Past Medical History:     Past Medical History:   Diagnosis Date     Adjustment disorder with depressed mood 01/22/2018     Adjustment disorder with mixed disturbance of emotions and conduct 05/08/2016     Anxiety 12/11/2019     Astigmatism 11/30/2010    Formatting of this note might be different from the original. IMO Update     Irregular menses 05/23/2022     Nephrolithiasis 01/24/2023    S/P Left ureteroscopy with laser lithotripsy and stent placement     PCOS (polycystic ovarian syndrome) 07/18/2022     Primary oligomenorrhea 05/23/2022     PTSD (post-traumatic stress disorder) 01/22/2018     Suicidal behavior  "2016     [place], premature 2008    Overview:  Asymmetric.  Slight nipple enlargement on left side.  Right side has breast tissue which is probably 3-4 cm. IMO Update 10/11  Formatting of this note might be different from the original. Asymmetric.  Slight nipple enlargement on left side.  Right side has breast tissue which is probably 3-4 cm. IMO Update 10/11          Past Surgical History:     Past Surgical History:   Procedure Laterality Date     COMBINED CYSTOSCOPY, URETEROSCOPY, LASER HOLMIUM LITHOTRIPSY URETER(S) Left 2023    Procedure: Left ureteroscopy with laser lithotripsy and stent placement;  Surgeon: James Hanley MD;  Location:  OR     COMBINED CYSTOSCOPY, URETEROSCOPY, LASER HOLMIUM LITHOTRIPSY URETER(S) Left 3/9/2023    Procedure: Left ureteroscopy with laser lithotripsy and stent placement;  Surgeon: James Hanley MD;  Location:  OR     WISDOM TOOTH EXTRACTION Bilateral             Obstetric History:   EDC: Estimated Date of Delivery: Jul 15, 2023  OB History    Para Term  AB Living   1 0 0 0 0 0   SAB IAB Ectopic Multiple Live Births   0 0 0 0 0      # Outcome Date GA Lbr Gavin/2nd Weight Sex Delivery Anes PTL Lv   1 Current                    Family History:   Unchanged from prenatal record.         Social History:     Social History     Tobacco Use     Smoking status: Never     Smokeless tobacco: Never   Vaping Use     Vaping status: Never Used   Substance Use Topics     Alcohol use: No     History   Sexual Activity     Sexual activity: Yes     Partners: Male     Birth control/ protection: None            Physical Exam:   Vital signs:  Temp 98.4  F (36.9  C) (Oral)   Resp 16   Ht 1.499 m (4' 11\")   Wt 51.7 kg (114 lb)   SpO2 98%   BMI 23.03 kg/m    General: Alert and oriented. Well developed and well nourished gravid female.    Fetal Heart Rate Tracing: baseline 140, moderate variability, + accels, no decels  Tocometer: none        Diagnostics: "   LABS:  Prenatal labs reviewed    RADIOLOGY:  OB ultrasounds reviewed          Assessment:   1. 22 year old  female at 33w2d with IUGR  2. Reassuring fetal status  3. Sciatica        Plan:   1. Discharge to home. Discussed reassuring fetal status.  2. Follow up with Dr. Hoang tomorrow  3. Referred to PT for management of sciatica        Elen Connor MD  Obstetrics and Gynecology

## 2023-05-29 NOTE — PLAN OF CARE
"Trinh Gonzales is a 22 year old  and 33w2d patient came in complaining of Non Stress Test    Patient Active Problem List   Diagnosis     Adjustment disorder with depressed mood     PTSD (post-traumatic stress disorder)     Suicidal behavior     Adjustment disorder with mixed disturbance of emotions and conduct     Anxiety     Astigmatism     PCOS (polycystic ovarian syndrome)     Pregnancy, supervision of first, third trimester     Nausea and vomiting during pregnancy     Poor fetal growth affecting management of mother in second trimester       Pt discharged to home at 1040 AM and encouraged to rest and drink plenty of fluids.  Pt told to call/return if bleeding more than spotting, water breaks, contractions 3-5 minutes apart that she has to breath through.     Recommend following up with [facility] per OBGYN.     Nursing education on plan of care provided. Self-management instructions reviewed. AVS given and signed. All questions answered and patient verbalizes understanding.    Temp 98.4  F (36.9  C) (Oral)   Resp 16   Ht 1.499 m (4' 11\")   Wt 51.7 kg (114 lb)   SpO2 98%   BMI 23.03 kg/m      Cervical status: not examined  Fetal Assessment: Reactive    Discharge support:  Independent-Alone    OB History    Para Term  AB Living   1 0 0 0 0 0   SAB IAB Ectopic Multiple Live Births   0 0 0 0 0      # Outcome Date GA Lbr Gavin/2nd Weight Sex Delivery Anes PTL Lv   1 Current                Nancy Hobson RN      "

## 2023-05-29 NOTE — DISCHARGE INSTRUCTIONS

## 2023-05-29 NOTE — PLAN OF CARE
Trinh Gonzales        NST:  reactive  Start: 1002   Stop: 1025    Physician: Dr. Connor  Reason For Test: IUGR  Estimated Date of Delivery: Jul 15, 2023   Gestational Age: 33w2d     Verified with second RN: PAWAN Hernandez, RN

## 2023-05-30 ENCOUNTER — PRENATAL OFFICE VISIT (OUTPATIENT)
Dept: OBGYN | Facility: OTHER | Age: 23
End: 2023-05-30
Attending: OBSTETRICS & GYNECOLOGY
Payer: COMMERCIAL

## 2023-05-30 ENCOUNTER — HOSPITAL ENCOUNTER (OUTPATIENT)
Dept: ULTRASOUND IMAGING | Facility: HOSPITAL | Age: 23
Discharge: HOME | End: 2023-05-30
Attending: OBSTETRICS & GYNECOLOGY
Payer: COMMERCIAL

## 2023-05-30 VITALS — WEIGHT: 119 LBS | BODY MASS INDEX: 24.04 KG/M2 | SYSTOLIC BLOOD PRESSURE: 90 MMHG | DIASTOLIC BLOOD PRESSURE: 62 MMHG

## 2023-05-30 DIAGNOSIS — Z34.02 PREGNANCY, SUPERVISION OF FIRST, SECOND TRIMESTER: ICD-10-CM

## 2023-05-30 DIAGNOSIS — O36.5920 POOR FETAL GROWTH AFFECTING MANAGEMENT OF MOTHER IN SECOND TRIMESTER, SINGLE OR UNSPECIFIED FETUS: ICD-10-CM

## 2023-05-30 DIAGNOSIS — Z34.03 PREGNANCY, SUPERVISION OF FIRST, THIRD TRIMESTER: Primary | ICD-10-CM

## 2023-05-30 PROCEDURE — 99207 PR COMPLICATED OB VISIT: CPT | Performed by: OBSTETRICS & GYNECOLOGY

## 2023-05-30 PROCEDURE — 76820 UMBILICAL ARTERY ECHO: CPT

## 2023-05-30 ASSESSMENT — PAIN SCALES - GENERAL: PAINLEVEL: NO PAIN (0)

## 2023-05-30 NOTE — PROGRESS NOTES
SUBJECTIVE  Trinh Gonzales is a 22 year old  with No LMP recorded. Patient is pregnant.. Estimated Date of Delivery: Jul 15, 2023. Gestational age is 33w3d. She presents for follow up OB visit.    She has no complaints. She feels good fetal movement. She denies leaking of fluid or vaginal bleeding. No cramping, contractions, or pelvic pressure. She denies abnormal vaginal discharge, difficulty urinating, fever or chills. No headache, vision changes, lower extremity swelling, upper abdominal pain, chest pain, or shortness of breath.  She denies depression or anxiety symptoms with Lexapro and Hydroxyzine.  She continues to take Metformin daily for polycystic ovarian syndrome.    OBJECTIVE  BP 90/62   Wt 54 kg (119 lb)   BMI 24.04 kg/m      General:  Well-developed well-nourished gravid female in no apparent distress. Alert and oriented x3.  Abdomen: Soft, gravid, non tender, positive bowel sounds. Fundal height at 28 cm. Fetal heart tones auscultated at 140.  Presentation noted to be cephalic by Leopold.  Cervix: deferred  Extremities:  No clubbing, cyanosis, or edema. Nontender bilaterally.    DIAGNOSTICS  2023 OB ultrasound: 20 4/7 wk, 288 gm, 4%, cephalic presentation, anterior placenta, no placenta previa, normal BARBARA, normal fetal anatomy.  03/10/2023 OB level 2 ultrasound: 21 6/7 wk, 382 gm, 8%, breech presentation, anterior and fundal placenta, no placenta previa, normal BARBARA, fetal growth restriction, normal fetal anatomy.  2023 OB level 2 ultrasound: 24 6/7 wk, 598 gm, 5%, cephalic presentation, anterior and fundal placenta, no placenta previa, normal BARBARA, fetal growth restriction, normal fetal anatomy, umbilical artery doppler S/D ratio 1.36.  2023 OB ultrasound: 27 3/7 wk, 927 gm, 8.8%, cephalic presentation, anterior placenta, normal BARBARA, appropriate interval fetal growth, umbilical doppler S/D ratio 2.4.  05/15/2023 OB ultrasound: 30 6/7 wk, 1526 gm, 11%, cephalic  presentation, anterior placenta, normal BARBARA, normal umbilical artery S/D ratio 3.2.   2023 Limited OB ultrasound: cephalic presentation, fundal placenta, heart rate 133 bpm, normal BARBARA, umbilical artery S/D ratio 2.5- 3.3 which is normal  2023 OB ultrasound: pending    ASSESSMENT / PLAN  1 Intrauterine pregnancy at 33w3d.  2 EDC set by 9 wk ultrasound  3 Polycystic ovarian syndrome  4 Depression and anxiety  5 Kidney stones first trimester (S/P Left ureteroscopy with laser lithotripsy and stent placement 2023)  6 Fetal growth restriction  7 Elevated glucose screen with normal 3 hour GTT    - Problem list reviewed and updated.  - Pregnancy weight gain has been 5.578 kg (12 lb 4.8 oz) to date, which is adequate.  - I discussed fetal growth restriction with the patient including increased risk for  delivery as well as stillbirth.  - [facility] recommends serial growth ultrasounds every 3-4 weeks.  Next growth ultrasound scheduled on 2023.  - [facility] recommends weekly  testing with NST and umbilical artery dopplers. [facility].  - [facility] recommends IOL at 38-39 wk as long as NST remains reactive/ reassuring, UA Doppler remains in normal limits and interval growth is noted on follow up ultrasounds.  - I discussed the possible need for delivery at a tertiary care center.  - I discussed use of Lexapro, Hydroxyzine, and Metformin in pregnancy.  - I recommend the patient see mental health counselor for depression and anxiety follow-up.  - I reviewed depression precautions.  - I encouraged patient to follow-up with Urology as scheduled.  - I reviewed routine obstetrical precautions, recommendations and instructions with the patient including fetal movement and kick count instructions.  - I reviewed depression precautions.  - I reviewed pre-eclampsia precautions with the patient with instructions for the patient to come in for persistent headache unrelieved with Tylenol, blurry  vision, right upper quadrant pain, shortness of breath, or significant persistent edema.  - I reviewed  labor precautions with the patient with instructions for the patient to come in for decreased fetal movement, suspected rupture of membranes, regular contraction pattern, vaginal bleeding or any other concerning symptoms.  - Follow up in 1 weeks.    Tai Hoang MD  Obstetrics and Gynecology

## 2023-06-01 ENCOUNTER — NURSE TRIAGE (OUTPATIENT)
Dept: NURSING | Facility: CLINIC | Age: 23
End: 2023-06-01
Payer: COMMERCIAL

## 2023-06-01 NOTE — TELEPHONE ENCOUNTER
Patient calling. Patient  currently 33 weeks, 5 days pregnant. This morning, she been feeling light-headed and having vision problems. She states that this has continued with any activity throughout the day.     Care advice given for patient to go to office now.. Routed to [facility] as second-level triage.     Dagmar Florez RN  [facility]  June 1, 2023, 2:59 PM    Reason for Disposition    Lightheadedness (dizziness) present now, after 2 hours of rest and fluids    Additional Information    Negative: SEVERE difficulty breathing (e.g., struggling for each breath, speaks in single words)    Negative: Shock suspected (e.g., cold/pale/clammy skin, too weak to stand, low BP, rapid pulse)    Negative: Difficult to awaken or acting confused (e.g., disoriented, slurred speech)    Negative: Fainted, and still feels dizzy afterwards    Negative: Overdose (accidental or intentional) of medications    Negative: New neurologic deficit that is present now: * Weakness of the face, arm, or leg on one side of the body * Numbness of the face, arm, or leg on one side of the body * Loss of speech or garbled speech    Negative: Heart beating < 50 beats per minute OR > 140 beats per minute    Negative: Sounds like a life-threatening emergency to the triager    Negative: Chest pain    Negative: Rectal bleeding, bloody stool, or tarry-black stool    Negative: Vomiting is main symptom    Negative: Diarrhea is main symptom    Negative: Headache is main symptom    Negative: Heat exhaustion suspected (i.e., dehydration from heat exposure)    Negative: Patient states that they are having an anxiety or panic attack    Negative: Dizziness from low blood sugar (i.e., < 60 mg/dl or 3.5 mmol/l)    Negative: SEVERE dizziness (e.g., unable to stand, requires support to walk, feels like passing out now)    Negative: SEVERE headache or neck pain    Negative: Spinning or tilting sensation (vertigo) present now and one or more stroke  risk factors (i.e., hypertension, diabetes mellitus, prior stroke/TIA, heart attack, age over 60) (Exception: prior physician evaluation for this AND no different/worse than usual)    Negative: Neurologic deficit that was brief (now gone), ANY of the following:* Weakness of the face, arm, or leg on one side of the body* Numbness of the face, arm, or leg on one side of the body* Loss of speech or garbled speech    Negative: Loss of vision or double vision  (Exception: Similar to previous migraines.)    Negative: Extra heart beats OR irregular heart beating (i.e., 'palpitations')    Negative: Difficulty breathing    Negative: Drinking very little and has signs of dehydration (e.g., no urine > 12 hours, very dry mouth, very lightheaded)    Negative: Follows bleeding (e.g., stomach, rectum, vagina)  (Exception: Became dizzy from sight of small amount blood.)    Negative: Patient sounds very sick or weak to the triager    Protocols used: DIZZINESS-A-OH

## 2023-06-02 NOTE — TELEPHONE ENCOUNTER
Brandi Nolan APRN CNP Larson, Amber L, RN 2 hours ago (7:52 AM)     NM  I was out of the office yesterday.  She should be seen if not seen yet.  Have her call her OB to see if they can see her.     Brandi RODNEY FNP-BC   Family Nurse Practitioner

## 2023-06-05 ENCOUNTER — HOSPITAL ENCOUNTER (OUTPATIENT)
Dept: ULTRASOUND IMAGING | Facility: HOSPITAL | Age: 23
Discharge: HOME | End: 2023-06-05
Attending: OBSTETRICS & GYNECOLOGY
Payer: COMMERCIAL

## 2023-06-05 ENCOUNTER — HOSPITAL ENCOUNTER (OUTPATIENT)
Facility: HOSPITAL | Age: 23
Discharge: HOME | End: 2023-06-05
Attending: OBSTETRICS & GYNECOLOGY | Admitting: OBSTETRICS & GYNECOLOGY
Payer: COMMERCIAL

## 2023-06-05 ENCOUNTER — PRENATAL OFFICE VISIT (OUTPATIENT)
Dept: OBGYN | Facility: OTHER | Age: 23
End: 2023-06-05
Attending: OBSTETRICS & GYNECOLOGY
Payer: COMMERCIAL

## 2023-06-05 VITALS — SYSTOLIC BLOOD PRESSURE: 90 MMHG | WEIGHT: 118 LBS | DIASTOLIC BLOOD PRESSURE: 64 MMHG | BODY MASS INDEX: 23.83 KG/M2

## 2023-06-05 VITALS
TEMPERATURE: 97.5 F | OXYGEN SATURATION: 99 % | HEART RATE: 105 BPM | DIASTOLIC BLOOD PRESSURE: 64 MMHG | SYSTOLIC BLOOD PRESSURE: 100 MMHG | RESPIRATION RATE: 18 BRPM

## 2023-06-05 DIAGNOSIS — Z34.03 PREGNANCY, SUPERVISION OF FIRST, THIRD TRIMESTER: ICD-10-CM

## 2023-06-05 DIAGNOSIS — O36.5920 POOR FETAL GROWTH AFFECTING MANAGEMENT OF MOTHER IN SECOND TRIMESTER, SINGLE OR UNSPECIFIED FETUS: ICD-10-CM

## 2023-06-05 DIAGNOSIS — Z34.03 PREGNANCY, SUPERVISION OF FIRST, THIRD TRIMESTER: Primary | ICD-10-CM

## 2023-06-05 PROCEDURE — 59025 FETAL NON-STRESS TEST: CPT | Mod: 26 | Performed by: OBSTETRICS & GYNECOLOGY

## 2023-06-05 PROCEDURE — 99207 PR COMPLICATED OB VISIT: CPT | Performed by: OBSTETRICS & GYNECOLOGY

## 2023-06-05 PROCEDURE — G0463 HOSPITAL OUTPT CLINIC VISIT: HCPCS | Mod: 25

## 2023-06-05 PROCEDURE — 59025 FETAL NON-STRESS TEST: CPT

## 2023-06-05 PROCEDURE — 76820 UMBILICAL ARTERY ECHO: CPT

## 2023-06-05 ASSESSMENT — PAIN SCALES - GENERAL: PAINLEVEL: NO PAIN (0)

## 2023-06-05 ASSESSMENT — ACTIVITIES OF DAILY LIVING (ADL): ADLS_ACUITY_SCORE: 35

## 2023-06-05 NOTE — DISCHARGE INSTRUCTIONS

## 2023-06-05 NOTE — PLAN OF CARE
Trinh Gonzales        NST:  reactive  Start: 1015  Stop: 1035    Physician: Dr. Hoang  Reason For Test: IUGR  Estimated Date of Delivery: Jul 15, 2023   Gestational Age: 34w2d     Verified with second RN: Mary VILLALTA MD aware of reactive NST, discharge orders placed.   Eric Ochoa RN

## 2023-06-05 NOTE — PROGRESS NOTES
SUBJECTIVE  Trinh Gonzales is a 22 year old  with No LMP recorded. Patient is pregnant.. Estimated Date of Delivery: Jul 15, 2023. Gestational age is 34w2d. She presents for follow up OB visit.    She is doing well. She feels good fetal movement. She denies leaking of fluid or vaginal bleeding. No cramping, contractions, or pelvic pressure. She denies abnormal vaginal discharge, difficulty urinating, fever or chills. No headache, vision changes, lower extremity swelling, upper abdominal pain, chest pain, or shortness of breath.  She denies anxiety or depression symptoms on Lexapro and Hydroxyzine.  She takes Metformin daily for polycystic ovarian syndrome.    OBJECTIVE  BP 90/64   Wt 53.5 kg (118 lb)   BMI 23.83 kg/m      General:  Well-developed well-nourished gravid female in no apparent distress. Alert and oriented x3.  Abdomen: Soft, gravid, non tender, positive bowel sounds. Fundal height at 28 cm. Fetal heart tones auscultated at 136.  Presentation noted to be cephalic by Leopold.  Cervix: deferred  Extremities:  No clubbing, cyanosis, or edema. Nontender bilaterally.    DIAGNOSTICS  2023 OB ultrasound: 20 4/7 wk, 288 gm, 4%, cephalic presentation, anterior placenta, no placenta previa, normal BARBARA, normal fetal anatomy.  03/10/2023 OB level 2 ultrasound: 21 6/7 wk, 382 gm, 8%, breech presentation, anterior and fundal placenta, no placenta previa, normal BARBARA, fetal growth restriction, normal fetal anatomy.  2023 OB level 2 ultrasound: 24 6/7 wk, 598 gm, 5%, cephalic presentation, anterior and fundal placenta, no placenta previa, normal BARBARA, fetal growth restriction, normal fetal anatomy, umbilical artery doppler S/D ratio 1.36.  2023 OB ultrasound: 27 3/7 wk, 927 gm, 8.8%, cephalic presentation, anterior placenta, normal BARBARA, appropriate interval fetal growth, umbilical doppler S/D ratio 2.4.  05/15/2023 OB ultrasound: 30 6/7 wk, 1526 gm, 11%, cephalic presentation, anterior  placenta, normal BARBARA, normal umbilical artery S/D ratio 3.2.   2023 Limited OB ultrasound: cephalic presentation, anterior grade 2 placenta, heart rate 149 bpm, normal BARBARA, umbilical cord S/D ratio 2.7 which is normal for gestational age    ASSESSMENT / PLAN  1 Intrauterine pregnancy at 34w2d.  2 EDC set by 9 wk ultrasound  3 Polycystic ovarian syndrome  4 Depression and anxiety  5 Kidney stones first trimester (S/P Left ureteroscopy with laser lithotripsy and stent placement 2023)  6 Fetal growth restriction  7 Elevated glucose screen with normal 3 hour GTT    - Problem list reviewed and updated.  - Pregnancy weight gain has been 5.125 kg (11 lb 4.8 oz) to date, which is adequate.  - I discussed fetal growth restriction with the patient including increased risk for  delivery as well as stillbirth.  - [facility] recommends serial growth ultrasounds every 3-4 weeks.  Next growth ultrasound scheduled on 2023.  - [facility] recommends weekly  testing with NST and umbilical artery dopplers. The patient is going to [facility] for NST.  - [facility] recommends IOL at 38-39 wk as long as NST remains reactive/ reassuring, UA Doppler remains in normal limits and interval growth is noted on follow up ultrasounds.  - I discussed the possible need for delivery at a tertiary care center.  - I discussed use of Lexapro, Hydroxyzine, and Metformin in pregnancy.  - I recommend the patient see mental health counselor for depression and anxiety follow-up.  - I reviewed depression precautions.  - I encouraged patient to follow-up with Urology as scheduled.  - I reviewed routine obstetrical precautions, recommendations and instructions with the patient including fetal movement and kick count instructions.  - I reviewed depression precautions.  - I reviewed pre-eclampsia precautions with the patient with instructions for the patient to come in for persistent headache unrelieved with Tylenol, blurry vision, right upper quadrant  pain, shortness of breath, or significant persistent edema.  - I reviewed  labor precautions with the patient with instructions for the patient to come in for decreased fetal movement, suspected rupture of membranes, regular contraction pattern, vaginal bleeding or any other concerning symptoms.  - Follow up in 1 weeks.    Tai Hoang MD  Obstetrics and Gynecology

## 2023-06-05 NOTE — CARE PLAN
NST complete per Mary DAVE RN and Eric VILLALTA.  NST reactive.  AVS reviewed and pt verbalized understanding.  Pt discharged ambulatory and is aware to call with any questions or concerns.

## 2023-06-12 ENCOUNTER — HOSPITAL ENCOUNTER (OUTPATIENT)
Dept: ULTRASOUND IMAGING | Facility: HOSPITAL | Age: 23
Discharge: HOME | End: 2023-06-12
Attending: OBSTETRICS & GYNECOLOGY
Payer: COMMERCIAL

## 2023-06-12 ENCOUNTER — PRENATAL OFFICE VISIT (OUTPATIENT)
Dept: OBGYN | Facility: OTHER | Age: 23
End: 2023-06-12
Attending: OBSTETRICS & GYNECOLOGY
Payer: COMMERCIAL

## 2023-06-12 ENCOUNTER — HOSPITAL ENCOUNTER (OUTPATIENT)
Facility: HOSPITAL | Age: 23
Discharge: HOME | End: 2023-06-12
Attending: OBSTETRICS & GYNECOLOGY | Admitting: OBSTETRICS & GYNECOLOGY
Payer: COMMERCIAL

## 2023-06-12 VITALS
WEIGHT: 119.8 LBS | HEIGHT: 59 IN | HEART RATE: 100 BPM | SYSTOLIC BLOOD PRESSURE: 100 MMHG | BODY MASS INDEX: 24.15 KG/M2 | OXYGEN SATURATION: 99 % | DIASTOLIC BLOOD PRESSURE: 60 MMHG | RESPIRATION RATE: 14 BRPM

## 2023-06-12 VITALS
DIASTOLIC BLOOD PRESSURE: 67 MMHG | HEIGHT: 59 IN | SYSTOLIC BLOOD PRESSURE: 107 MMHG | TEMPERATURE: 97.7 F | OXYGEN SATURATION: 100 % | RESPIRATION RATE: 16 BRPM | BODY MASS INDEX: 23.99 KG/M2 | WEIGHT: 119 LBS

## 2023-06-12 DIAGNOSIS — Z34.03 PREGNANCY, SUPERVISION OF FIRST, THIRD TRIMESTER: ICD-10-CM

## 2023-06-12 DIAGNOSIS — O36.5920 POOR FETAL GROWTH AFFECTING MANAGEMENT OF MOTHER IN SECOND TRIMESTER, SINGLE OR UNSPECIFIED FETUS: ICD-10-CM

## 2023-06-12 DIAGNOSIS — Z34.03 PREGNANCY, SUPERVISION OF FIRST, THIRD TRIMESTER: Primary | ICD-10-CM

## 2023-06-12 PROCEDURE — 76816 OB US FOLLOW-UP PER FETUS: CPT

## 2023-06-12 PROCEDURE — 59025 FETAL NON-STRESS TEST: CPT

## 2023-06-12 PROCEDURE — 87653 STREP B DNA AMP PROBE: CPT | Performed by: OBSTETRICS & GYNECOLOGY

## 2023-06-12 PROCEDURE — 59025 FETAL NON-STRESS TEST: CPT | Mod: 26 | Performed by: OBSTETRICS & GYNECOLOGY

## 2023-06-12 PROCEDURE — 76820 UMBILICAL ARTERY ECHO: CPT

## 2023-06-12 PROCEDURE — 99207 PR COMPLICATED OB VISIT: CPT | Performed by: OBSTETRICS & GYNECOLOGY

## 2023-06-12 ASSESSMENT — ACTIVITIES OF DAILY LIVING (ADL): ADLS_ACUITY_SCORE: 35

## 2023-06-12 ASSESSMENT — PAIN SCALES - GENERAL: PAINLEVEL: MODERATE PAIN (5)

## 2023-06-12 NOTE — PROGRESS NOTES
Name: Trinh Gonzales  Age: 22 year old  YOB: 2000   Medical Record #: [id number]     Fetal Non-Stress Test Results    NST Ordered By: Tai Hoang MD  NST Medical Indication: IUGR  Gestational Age: 34w2d       NST Start & Stop Times  NST Start Time: 1015  NST Stop Time: 1035    NST Results  Fetus A   Baseline Rate: 130-140  Variability: Present  Accelerations: Present  Decelerations: None  Interpretation: reactive and reassuring   Category:1            Tai Hoang MD  Obstetrics and Gynecology

## 2023-06-12 NOTE — PROGRESS NOTES
"SUBJECTIVE  Trinh Gonzales is a 22 year old  with No LMP recorded. Patient is pregnant.. Estimated Date of Delivery: Jul 15, 2023. Gestational age is 35w2d. She presents for follow up OB visit.    She has irregular contractions and pelvic pressure that are worse when she is standing. Theses resolve with rest. She feels good fetal movement. No leaking of fluid or vaginal bleeding. She denies abnormal vaginal discharge, difficulty urinating, fever or chills. No headache, vision changes, lower extremity swelling, upper abdominal pain, chest pain, or shortness of breath.  She denies depression or anxiety symptoms with Hydroxyzine and Lexapro.  She continues to take Metformin for polycystic ovarian syndrome.    OBJECTIVE  /60 (BP Location: Left arm, Patient Position: Sitting, Cuff Size: Adult Regular)   Pulse 100   Resp 14   Ht 1.499 m (4' 11\")   Wt 54.3 kg (119 lb 12.8 oz)   SpO2 99%   BMI 24.20 kg/m      General:  Well-developed well-nourished gravid female in no apparent distress. Alert and oriented x3.  Abdomen: Soft, gravid, non tender, positive bowel sounds. Fundal height at 29 cm. Fetal heart tones auscultated at 142.  Presentation noted to be cephalic by Leopold.  Cervix: 0 cm, 30 % effacement, -3 station, vertex presentation  Extremities:  No clubbing, cyanosis, or edema. Nontender bilaterally.    Chaperone: Jade Anderson RN    DIAGNOSTICS  2023 GBS pending    2023 OB ultrasound: 20 4/7 wk, 288 gm, 4%, cephalic presentation, anterior placenta, no placenta previa, normal BARBARA, normal fetal anatomy.  03/10/2023 OB level 2 ultrasound: 21 6/7 wk, 382 gm, 8%, breech presentation, anterior and fundal placenta, no placenta previa, normal BARBARA, fetal growth restriction, normal fetal anatomy.  2023 OB level 2 ultrasound: 24 6/7 wk, 598 gm, 5%, cephalic presentation, anterior and fundal placenta, no placenta previa, normal BARBARA, fetal growth restriction, normal fetal anatomy, " umbilical artery doppler S/D ratio 1.36.  2023 OB ultrasound: 27 3/7 wk, 927 gm, 8.8%, cephalic presentation, anterior placenta, normal BARBARA, appropriate interval fetal growth, umbilical doppler S/D ratio 2.4.  05/15/2023 OB ultrasound: 30 6/7 wk, 1526 gm, 11%, cephalic presentation, anterior placenta, normal BARBARA, normal umbilical artery S/D ratio 3.2.   2023 OB ultrasound: 35 2/7 wk, 2440 gm, 26%, cephalic presentation, anterior placenta, normal BARBARA, normal umbilical artery S/D ratio 2.3- 3.1.     ASSESSMENT / PLAN  1 Intrauterine pregnancy at 35w2d.  2 EDC set by 9 wk ultrasound  3 Polycystic ovarian syndrome  4 Depression and anxiety  5 Kidney stones first trimester (S/P Left ureteroscopy with laser lithotripsy and stent placement 2023)  6 Fetal growth restriction  7 Elevated glucose screen with normal 3 hour GTT    - Problem list reviewed and updated.  - Pregnancy weight gain has been 5.578 kg (12 lb 4.8 oz) to date, which is adequate.  - GBS culture obtained today.  - I discussed OB ultrasound with the patient which shows cephalic presentation, normal fluid level, normal umbilical artery SD ratio, and 2440 g which is 26 percentile for growth.  - I discussed fetal growth restriction with the patient including increased risk for  delivery as well as stillbirth.  - [facility] recommends weekly  testing with NST and umbilical artery dopplers. [facility].  - [facility] recommends IOL at 38-39 wk as long as NST remains reactive/ reassuring, UA Doppler remains in normal limits and interval growth is noted on follow up ultrasounds.  - I discussed the possible need for delivery at a tertiary care center.  - I discussed use of Lexapro, Hydroxyzine, and Metformin in pregnancy.  - I recommend the patient see mental health counselor for depression and anxiety follow-up.  - I reviewed depression precautions.  - I encouraged patient to follow-up with Urology as scheduled.  - I  reviewed routine obstetrical precautions, recommendations and instructions with the patient including fetal movement and kick count instructions.  - I reviewed pre-eclampsia precautions with the patient with instructions for the patient to come in for persistent headache unrelieved with Tylenol, blurry vision, right upper quadrant pain, shortness of breath, or significant persistent edema.  - I reviewed  labor precautions with the patient with instructions for the patient to come in for decreased fetal movement, suspected rupture of membranes, regular contraction pattern, vaginal bleeding or any other concerning symptoms.  - Follow up in 1 week.    Tai Hoang MD  Obstetrics and Gynecology

## 2023-06-12 NOTE — PROVIDER NOTIFICATION
06/12/23 1045   Provider Notification   Provider Name/Title Dr. Hoang   Method of Notification Phone   Request Evaluate - Remote   Notification Reason Patient Arrived;Status Update     Updated provider on reactive NST. Did keep the tracing on longer d/t fetal heart tracing switching baselines from 145 to 140 and thought a variable was present but d/t the baseline change, there was in fact no variable. Dr. Hoang ok with patient discharging and she will be back for another NST next Monday.

## 2023-06-12 NOTE — PROGRESS NOTES
Fetal Non-Stress Test Results    NST Ordered By: Dr. Hoang       NST Start & Stop Times    Start: 1007am    Stop: 1047am                                  NST Results  Fetus A   Baseline Rate: 145 -> 140  Accelerations: Present  Decelerations: None  Interpretation: reactive    Patient denied any leakage of fluid, vaginal bleeding, or contractions.     Educated patients signs and symptoms of pre term labor, rupture of membranes, signs & symptoms of GHTN/Preeclampsia. AVS signed and given to patient. No further questions at this time. Ambulatory discharge at 1057 am.

## 2023-06-13 LAB — GP B STREP DNA SPEC QL NAA+PROBE: NEGATIVE

## 2023-06-19 ENCOUNTER — PRENATAL OFFICE VISIT (OUTPATIENT)
Dept: OBGYN | Facility: OTHER | Age: 23
End: 2023-06-19
Attending: OBSTETRICS & GYNECOLOGY
Payer: COMMERCIAL

## 2023-06-19 ENCOUNTER — HOSPITAL ENCOUNTER (OUTPATIENT)
Facility: HOSPITAL | Age: 23
Discharge: HOME | End: 2023-06-19
Attending: OBSTETRICS & GYNECOLOGY | Admitting: OBSTETRICS & GYNECOLOGY
Payer: COMMERCIAL

## 2023-06-19 ENCOUNTER — HOSPITAL ENCOUNTER (OUTPATIENT)
Dept: ULTRASOUND IMAGING | Facility: HOSPITAL | Age: 23
Discharge: HOME | End: 2023-06-19
Attending: OBSTETRICS & GYNECOLOGY
Payer: COMMERCIAL

## 2023-06-19 VITALS
DIASTOLIC BLOOD PRESSURE: 59 MMHG | SYSTOLIC BLOOD PRESSURE: 94 MMHG | OXYGEN SATURATION: 100 % | HEART RATE: 109 BPM | RESPIRATION RATE: 16 BRPM

## 2023-06-19 VITALS
HEIGHT: 59 IN | SYSTOLIC BLOOD PRESSURE: 104 MMHG | DIASTOLIC BLOOD PRESSURE: 68 MMHG | WEIGHT: 120 LBS | HEART RATE: 99 BPM | BODY MASS INDEX: 24.19 KG/M2 | OXYGEN SATURATION: 100 %

## 2023-06-19 DIAGNOSIS — O36.5920 POOR FETAL GROWTH AFFECTING MANAGEMENT OF MOTHER IN SECOND TRIMESTER, SINGLE OR UNSPECIFIED FETUS: ICD-10-CM

## 2023-06-19 DIAGNOSIS — Z34.03 PREGNANCY, SUPERVISION OF FIRST, THIRD TRIMESTER: Primary | ICD-10-CM

## 2023-06-19 DIAGNOSIS — Z34.03 PREGNANCY, SUPERVISION OF FIRST, THIRD TRIMESTER: ICD-10-CM

## 2023-06-19 PROCEDURE — 99207 PR COMPLICATED OB VISIT: CPT | Performed by: OBSTETRICS & GYNECOLOGY

## 2023-06-19 PROCEDURE — 76820 UMBILICAL ARTERY ECHO: CPT

## 2023-06-19 PROCEDURE — 59025 FETAL NON-STRESS TEST: CPT | Mod: 59

## 2023-06-19 ASSESSMENT — PAIN SCALES - GENERAL: PAINLEVEL: NO PAIN (0)

## 2023-06-19 NOTE — PROGRESS NOTES
"SUBJECTIVE  Trinh Gonzales is a 22 year old  with No LMP recorded. Patient is pregnant.. Estimated Date of Delivery: Jul 15, 2023. Gestational age is 36w2d. She presents for follow up OB visit.    She feels good fetal movement. Irregular contractions that resolve with rest. No leaking of fluid or vaginal bleeding. She denies abnormal vaginal discharge, difficulty urinating, fever or chills. No headache, vision changes, lower extremity swelling, upper abdominal pain, chest pain, or shortness of breath.  She denies anxiety or depression symptoms on Lexapro and Hydroxyzine.  She takes Metformin for polycystic ovarian syndrome..    OBJECTIVE  /68   Pulse 99   Ht 1.499 m (4' 11\")   Wt 54.4 kg (120 lb)   SpO2 100%   BMI 24.24 kg/m      General:  Well-developed well-nourished gravid female in no apparent distress. Alert and oriented x3.  Abdomen: Soft, gravid, non tender, positive bowel sounds. Fundal height at 30 cm. Fetal heart tones auscultated at 134.  Presentation noted to be cephalic by Leopold.  Cervix: 0 cm, 30 % effacement, -3 station, vertex presentation  Extremities:  No clubbing, cyanosis, or edema. Nontender bilaterally.    Chaperone: Lois Michelle RN    DIAGNOSTICS  2023 GBS negative    2023 OB ultrasound: 20 4/7 wk, 288 gm, 4%, cephalic presentation, anterior placenta, no placenta previa, normal BARBARA, normal fetal anatomy.  03/10/2023 OB level 2 ultrasound: 21 6/7 wk, 382 gm, 8%, breech presentation, anterior and fundal placenta, no placenta previa, normal BARBARA, fetal growth restriction, normal fetal anatomy.  2023 OB level 2 ultrasound: 24 6/7 wk, 598 gm, 5%, cephalic presentation, anterior and fundal placenta, no placenta previa, normal BARBARA, fetal growth restriction, normal fetal anatomy, umbilical artery doppler S/D ratio 1.36.  2023 OB ultrasound: 27 3/7 wk, 927 gm, 8.8%, cephalic presentation, anterior placenta, normal BARBARA, appropriate interval fetal growth, " umbilical doppler S/D ratio 2.4.  05/15/2023 OB ultrasound: 30 6/7 wk, 1526 gm, 11%, cephalic presentation, anterior placenta, normal BARBARA, normal umbilical artery S/D ratio 3.2.   2023 OB ultrasound: 35 2/7 wk, 2440 gm, 26%, cephalic presentation, anterior placenta, normal BARBARA, normal umbilical artery S/D ratio 2.3- 3.1.   / OB ultrasound pending    ASSESSMENT / PLAN  1 Intrauterine pregnancy at 36w2d.  2 EDC set by 9 wk ultrasound  3 Polycystic ovarian syndrome  4 Depression and anxiety  5 Kidney stones first trimester (S/P Left ureteroscopy with laser lithotripsy and stent placement 2023)  6 Fetal growth restriction  7 Elevated glucose screen with normal 3 hour GTT    - Problem list reviewed and updated.  - Pregnancy weight gain has been 6.032 kg (13 lb 4.8 oz) to date, which is adequate.  - GBS culture result reviewed.  - The patient plans to breast feed and she is undecided for contraception after delivery.  - I discussed fetal growth restriction with the patient including increased risk for  delivery as well as stillbirth.  - OB ultrasound pending  - [facility] recommends weekly  testing with NST and umbilical artery dopplers. [facility] is [facility].  - [facility] recommends IOL at 38-39 wk as long as NST remains reactive/ reassuring, UA Doppler remains in normal limits and interval growth is noted on follow up ultrasounds.  - I discussed use of Lexapro, Hydroxyzine, and Metformin in pregnancy.  - I recommend the patient see mental health counselor for depression and anxiety follow-up.  - I reviewed depression precautions.  - I encouraged patient to follow-up with Urology as scheduled.  - I reviewed routine obstetrical precautions, recommendations and instructions with the patient including fetal movement and kick count instructions.  - I reviewed pre-eclampsia precautions with the patient with instructions for the patient to come in for persistent headache unrelieved with  Tylenol, blurry vision, right upper quadrant pain, shortness of breath, or significant persistent edema.  - I reviewed labor precautions with the patient with instructions for the patient to come in for decreased fetal movement, suspected rupture of membranes, regular contraction pattern, vaginal bleeding or any other concerning symptoms.  - Follow up in 1 week.    Tai Hoang MD  Obstetrics and Gynecology

## 2023-06-19 NOTE — PROGRESS NOTES
Name: Trinh Gonzales  Age: 22 year old  YOB: 2000   Medical Record #: [id number]     Fetal Non-Stress Test Results    NST Ordered By: Tai Hoang MD  NST Medical Indication: IUGR  Gestational Age: 35w2d       NST Start & Stop Times  NST Start Time: 1007  NST Stop Time: 1047    NST Results  Fetus A   Baseline Rate: 140  Variability: Present  Accelerations: Present  Decelerations: None  Interpretation: reactive and reassuring   Category:1            Tai Hoang MD  Obstetrics and Gynecology

## 2023-06-19 NOTE — PROVIDER NOTIFICATION
Trinh Gonzales    NST:  reactive  Start: 0930  Stop: 0955    Physician: Dr. Hoang  Reason For Test: IUGR  Estimated Date of Delivery: Jul 15, 2023   Gestational Age: 36w2d     Verified with second RN: Michelle    Comments:  MD approved for discharge.  Patient had 1 mild contraction with no concerns at this time as she was seen in clinic/cervix closed upon MD visit today.      Geetha Garcia RN

## 2023-06-19 NOTE — DISCHARGE INSTRUCTIONS

## 2023-06-26 ENCOUNTER — HOSPITAL ENCOUNTER (OUTPATIENT)
Facility: HOSPITAL | Age: 23
Discharge: HOME | End: 2023-06-26
Attending: OBSTETRICS & GYNECOLOGY | Admitting: OBSTETRICS & GYNECOLOGY
Payer: COMMERCIAL

## 2023-06-26 ENCOUNTER — HOSPITAL ENCOUNTER (OUTPATIENT)
Dept: ULTRASOUND IMAGING | Facility: HOSPITAL | Age: 23
Discharge: HOME | End: 2023-06-26
Attending: OBSTETRICS & GYNECOLOGY
Payer: COMMERCIAL

## 2023-06-26 ENCOUNTER — PRENATAL OFFICE VISIT (OUTPATIENT)
Dept: OBGYN | Facility: OTHER | Age: 23
End: 2023-06-26
Attending: OBSTETRICS & GYNECOLOGY
Payer: COMMERCIAL

## 2023-06-26 VITALS
BODY MASS INDEX: 24.39 KG/M2 | TEMPERATURE: 97.9 F | DIASTOLIC BLOOD PRESSURE: 65 MMHG | WEIGHT: 121 LBS | RESPIRATION RATE: 16 BRPM | OXYGEN SATURATION: 100 % | HEIGHT: 59 IN | SYSTOLIC BLOOD PRESSURE: 97 MMHG

## 2023-06-26 VITALS — SYSTOLIC BLOOD PRESSURE: 94 MMHG | WEIGHT: 121 LBS | BODY MASS INDEX: 24.44 KG/M2 | DIASTOLIC BLOOD PRESSURE: 54 MMHG

## 2023-06-26 DIAGNOSIS — O36.5920 POOR FETAL GROWTH AFFECTING MANAGEMENT OF MOTHER IN SECOND TRIMESTER, SINGLE OR UNSPECIFIED FETUS: ICD-10-CM

## 2023-06-26 DIAGNOSIS — Z34.03 PREGNANCY, SUPERVISION OF FIRST, THIRD TRIMESTER: Primary | ICD-10-CM

## 2023-06-26 DIAGNOSIS — Z34.03 PREGNANCY, SUPERVISION OF FIRST, THIRD TRIMESTER: ICD-10-CM

## 2023-06-26 PROCEDURE — 99207 PR COMPLICATED OB VISIT: CPT | Performed by: OBSTETRICS & GYNECOLOGY

## 2023-06-26 PROCEDURE — 59025 FETAL NON-STRESS TEST: CPT | Mod: 59

## 2023-06-26 PROCEDURE — 76820 UMBILICAL ARTERY ECHO: CPT

## 2023-06-26 ASSESSMENT — PAIN SCALES - GENERAL: PAINLEVEL: NO PAIN (0)

## 2023-06-26 NOTE — PROGRESS NOTES
SUBJECTIVE  Trinh Gonzales is a 22 year old  with No LMP recorded. Patient is pregnant.. Estimated Date of Delivery: Jul 15, 2023. Gestational age is 37w2d. She presents for follow up OB visit.    She has irregular contractions which resolve with rest.  No leaking of fluid or vaginal bleeding. She feels good fetal movement. She denies abnormal vaginal discharge, difficulty urinating, fever or chills. No headache, vision changes, lower extremity swelling, upper abdominal pain, chest pain, or shortness of breath.  She denies depression or anxiety symptoms with Lexapro and Hydroxyzine.  She takes Metformin for polycystic ovarian syndrome.    OBJECTIVE  BP 94/54   Wt 54.9 kg (121 lb)   BMI 24.44 kg/m      General:  Well-developed well-nourished gravid female in no apparent distress. Alert and oriented x3.  Abdomen: Soft, gravid, non tender, positive bowel sounds. Fundal height at 31 cm. Fetal heart tones auscultated at 130.  Presentation noted to be cephalic by Leopold.  Cervix: 0 cm, 50 % effacement, -2 station, vertex presentation  Extremities:  No clubbing, cyanosis, or edema. Nontender bilaterally.    Chaperone: Andreia Nagy LPN    DIAGNOSTICS  2023 GBS negative     2023 OB ultrasound: 20 4/7 wk, 288 gm, 4%, cephalic presentation, anterior placenta, no placenta previa, normal BARBARA, normal fetal anatomy.  03/10/2023 OB level 2 ultrasound: 21 6/7 wk, 382 gm, 8%, breech presentation, anterior and fundal placenta, no placenta previa, normal BARBARA, fetal growth restriction, normal fetal anatomy.  2023 OB level 2 ultrasound: 24 6/7 wk, 598 gm, 5%, cephalic presentation, anterior and fundal placenta, no placenta previa, normal BARBARA, fetal growth restriction, normal fetal anatomy, umbilical artery doppler S/D ratio 1.36.  2023 OB ultrasound: 27 3/7 wk, 927 gm, 8.8%, cephalic presentation, anterior placenta, normal BARBARA, appropriate interval fetal growth, umbilical doppler S/D ratio  2.4.  05/15/2023 OB ultrasound: 30 6/7 wk, 1526 gm, 11%, cephalic presentation, anterior placenta, normal BARBARA, normal umbilical artery S/D ratio 3.2.   2023 OB ultrasound: 35 2/7 wk, 2440 gm, 26%, cephalic presentation, anterior placenta, normal BARBARA, normal umbilical artery S/D ratio 2.3- 3.1.   2023 Limited OB ultrasound: cephalic presentation, anterior placenta, heart rate 154 bpm, normal BARBARA, normal umbilical artery S/D ratio 2.5  2023 OB ultrasound pending    ASSESSMENT / PLAN  1 Intrauterine pregnancy at 37w2d.  2 EDC set by 9 wk ultrasound  3 Polycystic ovarian syndrome  4 Depression and anxiety  5 Kidney stones first trimester (S/P Left ureteroscopy with laser lithotripsy and stent placement 2023)  6 Fetal growth restriction  7 Elevated glucose screen with normal 3 hour GTT    - Problem list reviewed and updated.  - Pregnancy weight gain has been 6.485 kg (14 lb 4.8 oz) to date, which is adequate.  - The patient plans to breast feed and she is undecided for contraception after delivery.  - I discussed fetal growth restriction with the patient including increased risk for stillbirth.  - OB ultrasound pending  - [facility] recommends weekly  testing with NST and umbilical artery dopplers. The patient is going to [facility] for NST.  - [facility] recommends IOL at 38-39 wk as long as NST remains reactive/ reassuring, UA Doppler remains in normal limits and interval growth is noted on follow up ultrasounds.  - I have contacted [facility] and scheduled the patient for induction of labor on 2023 at 38 6/7 wk  - I discussed use of Lexapro, Hydroxyzine, and Metformin in pregnancy.  - I recommend the patient see mental health counselor for depression and anxiety follow-up.  - I reviewed depression precautions.  - I encouraged patient to follow-up with Urology as scheduled.  - I reviewed routine obstetrical precautions, recommendations and instructions with the patient including fetal movement and kick count  instructions.  - I reviewed depression precautions.  - I reviewed pre-eclampsia precautions with the patient with instructions for the patient to come in for persistent headache unrelieved with Tylenol, blurry vision, right upper quadrant pain, shortness of breath, or significant persistent edema.  - I reviewed labor precautions with the patient with instructions for the patient to come in for decreased fetal movement, suspected rupture of membranes, regular contraction pattern, vaginal bleeding or any other concerning symptoms.  - Follow up in 1 week.    Tai Hoang MD  Obstetrics and Gynecology

## 2023-06-26 NOTE — PLAN OF CARE
Trinh Gonzales        NST:  reactive  Start: 0957    Stop: 1031    Physician: Dr. Hoang  Reason For Test: IUGR  Estimated Date of Delivery: Jul 15, 2023   Gestational Age: 37w2d     Verified with second RN: Mary Kelley RN    Comments:  Ok to discharge per MD Nancy Hobson, RN

## 2023-06-26 NOTE — DISCHARGE INSTRUCTIONS

## 2023-06-26 NOTE — PLAN OF CARE
"Trinh Gonzales is a 22 year old  and 37w2d patient came in complaining of Non Stress Test    Patient Active Problem List   Diagnosis     Adjustment disorder with depressed mood     PTSD (post-traumatic stress disorder)     Suicidal behavior     Adjustment disorder with mixed disturbance of emotions and conduct     Anxiety     Astigmatism     PCOS (polycystic ovarian syndrome)     Pregnancy, supervision of first, third trimester     Nausea and vomiting during pregnancy     Poor fetal growth affecting management of mother in second trimester       Pt discharged to home at 10:35 AM and encouraged to rest and drink plenty of fluids.  Pt told to call/return if bleeding more than spotting, water breaks, contractions 3-5 minutes apart that she has to breath through.     Nursing education on plan of care provided. Self-management instructions reviewed. AVS given and signed. All questions answered and patient verbalizes understanding.    BP 97/65 (BP Location: Left arm, Patient Position: Semi-Ball's, Cuff Size: Adult Small)   Temp 97.9  F (36.6  C) (Oral)   Resp 16   Ht 1.499 m (4' 11\")   Wt 54.9 kg (121 lb)   SpO2 100%   BMI 24.44 kg/m      Cervical status: not examined  Fetal Assessment: Reactive    Discharge support:  Independent-Alone    OB History    Para Term  AB Living   1 0 0 0 0 0   SAB IAB Ectopic Multiple Live Births   0 0 0 0 0      # Outcome Date GA Lbr Gavin/2nd Weight Sex Delivery Anes PTL Lv   1 Current                Nancy Hobson, RN      "

## 2023-07-03 ENCOUNTER — HOSPITAL ENCOUNTER (OUTPATIENT)
Facility: HOSPITAL | Age: 23
Discharge: HOME | End: 2023-07-03
Attending: OBSTETRICS & GYNECOLOGY | Admitting: OBSTETRICS & GYNECOLOGY
Payer: COMMERCIAL

## 2023-07-03 VITALS
TEMPERATURE: 98.1 F | HEART RATE: 81 BPM | OXYGEN SATURATION: 99 % | SYSTOLIC BLOOD PRESSURE: 100 MMHG | DIASTOLIC BLOOD PRESSURE: 65 MMHG | RESPIRATION RATE: 18 BRPM

## 2023-07-03 PROCEDURE — 59025 FETAL NON-STRESS TEST: CPT | Mod: 26 | Performed by: OBSTETRICS & GYNECOLOGY

## 2023-07-03 PROCEDURE — 99213 OFFICE O/P EST LOW 20 MIN: CPT | Performed by: OBSTETRICS & GYNECOLOGY

## 2023-07-03 PROCEDURE — 59025 FETAL NON-STRESS TEST: CPT

## 2023-07-03 ASSESSMENT — ACTIVITIES OF DAILY LIVING (ADL): ADLS_ACUITY_SCORE: 35

## 2023-07-03 NOTE — DISCHARGE INSTRUCTIONS

## 2023-07-03 NOTE — PROGRESS NOTES
"Labor and Delivery OB Triage Progress Note    Name: Trinh Gonzales    Age: 22 year old    YOB: 2000   Medical Record #: [id number]     Date of Admission: 7/3/2023  Admitting Service: Obstetrics and Gynecology  Primary Provider: Brandi Nolan    DATE: 7/3/2023    Trinh Gonzales is a 22 year old  female at 38w2d with Estimated Date of Delivery: Jul 15, 2023         Chief Complaint:   Scheduled NST for IUGR, which is resolved with latest growth at 26%         History of Present Illness:   Trinh Gonzales is a 22 year old  female at 38w2d with Estimated Date of Delivery: Jul 15, 2023.     The OB prenatal record was reviewed with the patient. Briefly, this patient's pregnancy is complicated by:      Polycystic ovarian syndrome - taking metformin    Depression and anxiety - taking lexapro and hydroxyzine    Nausea and vomiting in pregnancy    Kidney stones first trimester (S/P Left ureteroscopy with laser lithotripsy and stent placement 2023)     Growth restriction (IUGR) - 35 wk 2440 gm (26%)    Elevated glucose screen with normal 3 hour GTT.    She complains of achy pain in her ribs when she sleeps and an occasional stabbing pain in her cervix, but is otherwise in good spirits and looking forward to having a baby later this week.         Review of Systems:   As per the HPI. Other systems are reviewed and negative.         Allergies:     Allergies   Allergen Reactions     Apple Juice      Gums/mouth gets itchy  Regular apples  Ok to have apple juice-just allergic to \"raw apples\" per patient     Lazar      Gums/mouth gets itchy     Pistachios [Nuts]             Medication Prior to Admission:     Medications Prior to Admission   Medication Sig Dispense Refill Last Dose     acetaminophen (TYLENOL) 500 MG tablet Take 500-1,000 mg by mouth every 6 hours as needed   More than a month     albuterol (PROAIR HFA/PROVENTIL HFA/VENTOLIN HFA) 108 (90 Base) MCG/ACT inhaler " Inhale 2 puffs into the lungs every 4 hours as needed   More than a month     escitalopram (LEXAPRO) 10 MG tablet Take 1 tablet (10 mg) by mouth daily 90 tablet 3 7/3/2023     metFORMIN (GLUCOPHAGE) 500 MG tablet TAKE 1 TABLET(500 MG) BY MOUTH TWICE DAILY WITH MEALS (Patient taking differently: Take 1,000 mg by mouth daily (with dinner)) 60 tablet 3 More than a month     ondansetron (ZOFRAN ODT) 4 MG ODT tab Take 1 tablet (4 mg) by mouth every 8 hours as needed for nausea or vomiting 50 tablet 3 More than a month     phenazopyridine (PYRIDIUM) 200 MG tablet Take 1 tablet (200 mg) by mouth 3 times daily as needed for irritation 6 tablet 0 More than a month     Prenatal MV & Min w/FA-DHA (PRENATAL GUMMIES) 0.18-25 MG CHEW    7/3/2023            Active Problem List:     Patient Active Problem List   Diagnosis     Adjustment disorder with depressed mood     PTSD (post-traumatic stress disorder)     Suicidal behavior     Adjustment disorder with mixed disturbance of emotions and conduct     Anxiety     Astigmatism     PCOS (polycystic ovarian syndrome)     Pregnancy, supervision of first, third trimester     Nausea and vomiting during pregnancy     Poor fetal growth affecting management of mother in second trimester          Past Medical History:     Past Medical History:   Diagnosis Date     Adjustment disorder with depressed mood 01/22/2018     Adjustment disorder with mixed disturbance of emotions and conduct 05/08/2016     Anxiety 12/11/2019     Astigmatism 11/30/2010    Formatting of this note might be different from the original. IMO Update     Irregular menses 05/23/2022     Nephrolithiasis 01/24/2023    S/P Left ureteroscopy with laser lithotripsy and stent placement     PCOS (polycystic ovarian syndrome) 07/18/2022     Primary oligomenorrhea 05/23/2022     PTSD (post-traumatic stress disorder) 01/22/2018     Suicidal behavior 05/08/2016     [place], premature 07/29/2008    Overview:  Asymmetric.  Slight nipple  enlargement on left side.  Right side has breast tissue which is probably 3-4 cm. IMO Update 10/11  Formatting of this note might be different from the original. Asymmetric.  Slight nipple enlargement on left side.  Right side has breast tissue which is probably 3-4 cm. IMO Update 10/11          Past Surgical History:     Past Surgical History:   Procedure Laterality Date     COMBINED CYSTOSCOPY, URETEROSCOPY, LASER HOLMIUM LITHOTRIPSY URETER(S) Left 2023    Procedure: Left ureteroscopy with laser lithotripsy and stent placement;  Surgeon: James Hanley MD;  Location:  OR     COMBINED CYSTOSCOPY, URETEROSCOPY, LASER HOLMIUM LITHOTRIPSY URETER(S) Left 3/9/2023    Procedure: Left ureteroscopy with laser lithotripsy and stent placement;  Surgeon: James Hanley MD;  Location:  OR     WISDOM TOOTH EXTRACTION Bilateral             Obstetric History:   EDC: Estimated Date of Delivery: Jul 15, 2023  OB History    Para Term  AB Living   1 0 0 0 0 0   SAB IAB Ectopic Multiple Live Births   0 0 0 0 0      # Outcome Date GA Lbr Gavin/2nd Weight Sex Delivery Anes PTL Lv   1 Current                    Family History:   Unchanged from prenatal record.         Social History:     Social History     Tobacco Use     Smoking status: Never     Smokeless tobacco: Never   Substance Use Topics     Alcohol use: No     History   Sexual Activity     Sexual activity: Yes     Partners: Male     Birth control/ protection: None            Physical Exam:   Vital signs:  /65 (BP Location: Left arm, Patient Position: Semi-Ball's, Cuff Size: Adult Small)   Pulse 81   Temp 98.1  F (36.7  C) (Oral)   Resp 18   SpO2 99%     General: Alert and oriented. Well developed and well nourished gravid female.    Fetal Heart Rate Tracing: baseline 150, moderate variability, + accels, no decels  Tocometer: quiet        Diagnostics:   LABS:  Prenatal labs reviewed    RADIOLOGY:  Prior ultrasounds reviewed        Assessment:    1. 22 year old  female at 38w2d with IUGR, recently resolved with latest growth at 26%        Plan:   1. Discharge to home  2. Reassuring fetal status  3. Return on Wednesday for evaluation and discussion regarding induction of labor on Thursday evening vs Friday morning.        Elen Connor MD  Obstetrics and Gynecology

## 2023-07-03 NOTE — PLAN OF CARE
Trinh Gonzales        NST:  reactive  Start: 0959  Stop: 1031     Physician: Dr. Hoang   Reason For Test: IUGR  Estimated Date of Delivery: Jul 15, 2023   Gestational Age: 38w2d     Verified with second RN: Sravanthi galvez     Comments:  Discharge instructions reviewed. AVS signed, all questions answered.      Cheri Angel RN

## 2023-07-05 ENCOUNTER — PREP FOR PROCEDURE (OUTPATIENT)
Dept: OBGYN | Facility: OTHER | Age: 23
End: 2023-07-05

## 2023-07-05 ENCOUNTER — PRENATAL OFFICE VISIT (OUTPATIENT)
Dept: OBGYN | Facility: OTHER | Age: 23
End: 2023-07-05
Attending: OBSTETRICS & GYNECOLOGY
Payer: COMMERCIAL

## 2023-07-05 ENCOUNTER — MYC MEDICAL ADVICE (OUTPATIENT)
Dept: OBGYN | Facility: OTHER | Age: 23
End: 2023-07-05

## 2023-07-05 VITALS — BODY MASS INDEX: 25.04 KG/M2 | WEIGHT: 124 LBS | SYSTOLIC BLOOD PRESSURE: 94 MMHG | DIASTOLIC BLOOD PRESSURE: 64 MMHG

## 2023-07-05 DIAGNOSIS — Z34.03 PREGNANCY, SUPERVISION OF FIRST, THIRD TRIMESTER: Primary | ICD-10-CM

## 2023-07-05 DIAGNOSIS — O36.5990 IUGR (INTRAUTERINE GROWTH RESTRICTION) AFFECTING CARE OF MOTHER: Primary | ICD-10-CM

## 2023-07-05 DIAGNOSIS — O36.5920 POOR FETAL GROWTH AFFECTING MANAGEMENT OF MOTHER IN SECOND TRIMESTER, SINGLE OR UNSPECIFIED FETUS: ICD-10-CM

## 2023-07-05 PROCEDURE — 99207 PR COMPLICATED OB VISIT: CPT | Performed by: OBSTETRICS & GYNECOLOGY

## 2023-07-05 RX ORDER — KETOROLAC TROMETHAMINE 30 MG/ML
30 INJECTION, SOLUTION INTRAMUSCULAR; INTRAVENOUS
Status: CANCELLED | OUTPATIENT
Start: 2023-07-05 | End: 2023-07-10

## 2023-07-05 RX ORDER — IBUPROFEN 800 MG/1
800 TABLET, FILM COATED ORAL
Status: CANCELLED | OUTPATIENT
Start: 2023-07-05 | End: 2023-07-10

## 2023-07-05 RX ORDER — PROCHLORPERAZINE 25 MG
25 SUPPOSITORY, RECTAL RECTAL EVERY 12 HOURS PRN
Status: CANCELLED | OUTPATIENT
Start: 2023-07-05

## 2023-07-05 RX ORDER — OXYTOCIN 10 [USP'U]/ML
10 INJECTION, SOLUTION INTRAMUSCULAR; INTRAVENOUS
Status: CANCELLED | OUTPATIENT
Start: 2023-07-05

## 2023-07-05 RX ORDER — ONDANSETRON 4 MG/1
4 TABLET, ORALLY DISINTEGRATING ORAL EVERY 6 HOURS PRN
Status: CANCELLED | OUTPATIENT
Start: 2023-07-05

## 2023-07-05 RX ORDER — OXYTOCIN/0.9 % SODIUM CHLORIDE 30/500 ML
100-340 PLASTIC BAG, INJECTION (ML) INTRAVENOUS CONTINUOUS PRN
Status: CANCELLED | OUTPATIENT
Start: 2023-07-05

## 2023-07-05 RX ORDER — MISOPROSTOL 200 UG/1
400 TABLET ORAL
Status: CANCELLED | OUTPATIENT
Start: 2023-07-05

## 2023-07-05 RX ORDER — FENTANYL CITRATE 50 UG/ML
50 INJECTION, SOLUTION INTRAMUSCULAR; INTRAVENOUS EVERY 30 MIN PRN
Status: CANCELLED | OUTPATIENT
Start: 2023-07-05

## 2023-07-05 RX ORDER — OXYTOCIN/0.9 % SODIUM CHLORIDE 30/500 ML
340 PLASTIC BAG, INJECTION (ML) INTRAVENOUS CONTINUOUS PRN
Status: CANCELLED | OUTPATIENT
Start: 2023-07-05

## 2023-07-05 RX ORDER — PROCHLORPERAZINE MALEATE 5 MG
10 TABLET ORAL EVERY 6 HOURS PRN
Status: CANCELLED | OUTPATIENT
Start: 2023-07-05

## 2023-07-05 RX ORDER — ONDANSETRON 2 MG/ML
4 INJECTION INTRAMUSCULAR; INTRAVENOUS EVERY 6 HOURS PRN
Status: CANCELLED | OUTPATIENT
Start: 2023-07-05

## 2023-07-05 RX ORDER — LIDOCAINE 40 MG/G
CREAM TOPICAL
Status: CANCELLED | OUTPATIENT
Start: 2023-07-05

## 2023-07-05 RX ORDER — CARBOPROST TROMETHAMINE 250 UG/ML
250 INJECTION, SOLUTION INTRAMUSCULAR
Status: CANCELLED | OUTPATIENT
Start: 2023-07-05

## 2023-07-05 RX ORDER — TRANEXAMIC ACID 10 MG/ML
1 INJECTION, SOLUTION INTRAVENOUS EVERY 30 MIN PRN
Status: CANCELLED | OUTPATIENT
Start: 2023-07-05

## 2023-07-05 RX ORDER — METOCLOPRAMIDE HYDROCHLORIDE 5 MG/ML
10 INJECTION INTRAMUSCULAR; INTRAVENOUS EVERY 6 HOURS PRN
Status: CANCELLED | OUTPATIENT
Start: 2023-07-05

## 2023-07-05 RX ORDER — SODIUM CHLORIDE, SODIUM LACTATE, POTASSIUM CHLORIDE, CALCIUM CHLORIDE 600; 310; 30; 20 MG/100ML; MG/100ML; MG/100ML; MG/100ML
INJECTION, SOLUTION INTRAVENOUS CONTINUOUS
Status: CANCELLED | OUTPATIENT
Start: 2023-07-05

## 2023-07-05 RX ORDER — NALOXONE HYDROCHLORIDE 0.4 MG/ML
0.4 INJECTION, SOLUTION INTRAMUSCULAR; INTRAVENOUS; SUBCUTANEOUS
Status: CANCELLED | OUTPATIENT
Start: 2023-07-05

## 2023-07-05 RX ORDER — METOCLOPRAMIDE 10 MG/1
10 TABLET ORAL EVERY 6 HOURS PRN
Status: CANCELLED | OUTPATIENT
Start: 2023-07-05

## 2023-07-05 RX ORDER — OXYTOCIN/0.9 % SODIUM CHLORIDE 30/500 ML
1-24 PLASTIC BAG, INJECTION (ML) INTRAVENOUS CONTINUOUS
Status: CANCELLED | OUTPATIENT
Start: 2023-07-05

## 2023-07-05 RX ORDER — NALOXONE HYDROCHLORIDE 0.4 MG/ML
0.2 INJECTION, SOLUTION INTRAMUSCULAR; INTRAVENOUS; SUBCUTANEOUS
Status: CANCELLED | OUTPATIENT
Start: 2023-07-05

## 2023-07-05 RX ORDER — CITRIC ACID/SODIUM CITRATE 334-500MG
30 SOLUTION, ORAL ORAL
Status: CANCELLED | OUTPATIENT
Start: 2023-07-05

## 2023-07-05 RX ORDER — ACETAMINOPHEN 325 MG/1
650 TABLET ORAL EVERY 4 HOURS PRN
Status: CANCELLED | OUTPATIENT
Start: 2023-07-05

## 2023-07-05 RX ORDER — OXYCODONE HYDROCHLORIDE 5 MG/1
5 TABLET ORAL
Status: CANCELLED | OUTPATIENT
Start: 2023-07-05

## 2023-07-05 RX ORDER — METHYLERGONOVINE MALEATE 0.2 MG/ML
200 INJECTION INTRAVENOUS
Status: CANCELLED | OUTPATIENT
Start: 2023-07-05

## 2023-07-05 RX ORDER — TERBUTALINE SULFATE 1 MG/ML
0.25 INJECTION, SOLUTION SUBCUTANEOUS
Status: CANCELLED | OUTPATIENT
Start: 2023-07-05

## 2023-07-05 RX ORDER — SODIUM CHLORIDE, SODIUM LACTATE, POTASSIUM CHLORIDE, CALCIUM CHLORIDE 600; 310; 30; 20 MG/100ML; MG/100ML; MG/100ML; MG/100ML
INJECTION, SOLUTION INTRAVENOUS CONTINUOUS PRN
Status: CANCELLED | OUTPATIENT
Start: 2023-07-05

## 2023-07-05 ASSESSMENT — PAIN SCALES - GENERAL: PAINLEVEL: MILD PAIN (2)

## 2023-07-05 NOTE — PROGRESS NOTES
Name: Trinh Gonzales  Age: 22 year old  YOB: 2000   Medical Record #: [id number]     Fetal Non-Stress Test Results    NST Ordered By: Tai Hoang MD  NST Medical Indication: IUGR  Gestational Age: 37w2d       NST Start & Stop Times  NST Start Time: 0957  NST Stop Time: 1031    NST Results  Fetus A   Baseline Rate: 130  Variability: Present  Accelerations: Present  Decelerations: None  Interpretation: reactive and reassuring   Category:1            Tai Hoang MD  Obstetrics and Gynecology

## 2023-07-05 NOTE — PROGRESS NOTES
SUBJECTIVE  Trinh Gonzales is a 22 year old  with No LMP recorded. Patient is pregnant.. Estimated Date of Delivery: Jul 15, 2023. Gestational age is 38w4d. She presents for follow up OB visit.    She feels good fetal movement. She has irregular contractions throughout the day that resolve with rest but denies leaking of fluid or vaginal bleeding. No abnormal vaginal discharge, difficulty urinating, fever or chills. No headache, vision changes, lower extremity swelling, upper abdominal pain, chest pain, or shortness of breath.  She denies depression or anxiety symptoms on Lexapro and Hydroxyzine.  She uses Metformin for polycystic ovarian syndrome..    OBJECTIVE  BP 94/64   Wt 56.2 kg (124 lb)   BMI 25.04 kg/m      General:  Well-developed well-nourished gravid female in no apparent distress. Alert and oriented x3.  Abdomen: Soft, gravid, non tender, positive bowel sounds. Fundal height at 32 cm. Fetal heart tones auscultated at 132.  Presentation noted to be cephalic by Leopold.  Cervix: Finger tip cm, 80 % effacement, -2 station, vertex presentation  Extremities:  No clubbing, cyanosis, or edema. Nontender bilaterally.    Chaperone: Andreia Nagy LPN    DIAGNOSTICS  2023 GBS negative     2023 OB ultrasound: 20 4/7 wk, 288 gm, 4%, cephalic presentation, anterior placenta, no placenta previa, normal BARBARA, normal fetal anatomy.  03/10/2023 OB level 2 ultrasound: 21 6/7 wk, 382 gm, 8%, breech presentation, anterior and fundal placenta, no placenta previa, normal BARBARA, fetal growth restriction, normal fetal anatomy.  2023 OB level 2 ultrasound: 24 6/7 wk, 598 gm, 5%, cephalic presentation, anterior and fundal placenta, no placenta previa, normal BARBARA, fetal growth restriction, normal fetal anatomy, umbilical artery doppler S/D ratio 1.36.  2023 OB ultrasound: 27 3/7 wk, 927 gm, 8.8%, cephalic presentation, anterior placenta, normal BARBARA, appropriate interval fetal growth, umbilical  doppler S/D ratio 2.4.  05/15/2023 OB ultrasound: 30 6/7 wk, 1526 gm, 11%, cephalic presentation, anterior placenta, normal BARBARA, normal umbilical artery S/D ratio 3.2.   06/12/2023 OB ultrasound: 35 2/7 wk, 2440 gm, 26%, cephalic presentation, anterior placenta, normal BARBARA, normal umbilical artery S/D ratio 2.3- 3.1.   06/26/2023 Limited OB ultrasound: cephalic presentation, anterior placenta, heart rate 137 bpm, normal BARBARA, Biophysical profile (BPP) score 8/8, normal umbilical artery S/D ratio 2.2    ASSESSMENT / PLAN  1 Intrauterine pregnancy at 38w4d.  2 EDC set by 9 wk ultrasound  3 Polycystic ovarian syndrome  4 Depression and anxiety  5 Kidney stones first trimester (S/P Left ureteroscopy with laser lithotripsy and stent placement 01/24/2023)  6 Fetal growth restriction  7 Elevated glucose screen with normal 3 hour GTT    - Problem list reviewed and updated.  - Pregnancy weight gain has been 7.846 kg (17 lb 4.8 oz) to date, which is adequate.  - GBS culture result reviewed.  - The patient plans to breast feed and she is undecided for contraception after delivery.  - [facility] recommends IOL at 38-39 wk as long as NST remains reactive/ reassuring, UA Doppler remains in normal limits and interval growth is noted on follow up ultrasounds.  - The patient just had a reactive NST.  - The patient is scheduled for induction of labor on 07/07/2023 at 38 6/7 wk  - I discussed use of Lexapro, Hydroxyzine, and Metformin in pregnancy.  - I recommend the patient see mental health counselor for depression and anxiety follow-up.  - I reviewed depression precautions.  - I encouraged patient to follow-up with Urology as scheduled.  - I reviewed routine obstetrical precautions, recommendations and instructions with the patient including fetal movement and kick count instructions.  - I reviewed pre-eclampsia precautions with the patient with instructions for the patient to come in for persistent headache unrelieved with Tylenol, blurry  vision, right upper quadrant pain, shortness of breath, or significant persistent edema.  - I reviewed labor precautions with the patient with instructions for the patient to come in for decreased fetal movement, suspected rupture of membranes, regular contraction pattern, vaginal bleeding or any other concerning symptoms.  - Follow up in 2 days.    Tai Hoang MD  Obstetrics and Gynecology

## 2023-07-06 ENCOUNTER — ANESTHESIA EVENT (OUTPATIENT)
Dept: SURGERY | Facility: HOSPITAL | Age: 23
End: 2023-07-06
Payer: COMMERCIAL

## 2023-07-06 ENCOUNTER — PRENATAL OFFICE VISIT (OUTPATIENT)
Dept: OBGYN | Facility: OTHER | Age: 23
End: 2023-07-06
Attending: OBSTETRICS & GYNECOLOGY
Payer: COMMERCIAL

## 2023-07-06 ENCOUNTER — HOSPITAL ENCOUNTER (OUTPATIENT)
Facility: HOSPITAL | Age: 23
Discharge: HOME | End: 2023-07-06
Attending: OBSTETRICS & GYNECOLOGY | Admitting: OBSTETRICS & GYNECOLOGY
Payer: COMMERCIAL

## 2023-07-06 VITALS
WEIGHT: 124 LBS | HEIGHT: 59 IN | OXYGEN SATURATION: 98 % | HEART RATE: 83 BPM | BODY MASS INDEX: 25 KG/M2 | DIASTOLIC BLOOD PRESSURE: 63 MMHG | SYSTOLIC BLOOD PRESSURE: 104 MMHG

## 2023-07-06 VITALS
TEMPERATURE: 98.2 F | SYSTOLIC BLOOD PRESSURE: 106 MMHG | HEIGHT: 59 IN | WEIGHT: 123 LBS | DIASTOLIC BLOOD PRESSURE: 59 MMHG | HEART RATE: 72 BPM | OXYGEN SATURATION: 99 % | RESPIRATION RATE: 18 BRPM | BODY MASS INDEX: 24.8 KG/M2

## 2023-07-06 DIAGNOSIS — Z34.03 PREGNANCY, SUPERVISION OF FIRST, THIRD TRIMESTER: Primary | ICD-10-CM

## 2023-07-06 DIAGNOSIS — O36.5920 POOR FETAL GROWTH AFFECTING MANAGEMENT OF MOTHER IN SECOND TRIMESTER, SINGLE OR UNSPECIFIED FETUS: ICD-10-CM

## 2023-07-06 PROCEDURE — 99215 OFFICE O/P EST HI 40 MIN: CPT | Performed by: OBSTETRICS & GYNECOLOGY

## 2023-07-06 PROCEDURE — 59025 FETAL NON-STRESS TEST: CPT

## 2023-07-06 RX ORDER — OXYTOCIN 10 [USP'U]/ML
10 INJECTION, SOLUTION INTRAMUSCULAR; INTRAVENOUS
Status: CANCELLED | OUTPATIENT
Start: 2023-07-06

## 2023-07-06 RX ORDER — MISOPROSTOL 200 UG/1
400 TABLET ORAL
Status: CANCELLED | OUTPATIENT
Start: 2023-07-06

## 2023-07-06 RX ORDER — OXYTOCIN/0.9 % SODIUM CHLORIDE 30/500 ML
100-340 PLASTIC BAG, INJECTION (ML) INTRAVENOUS CONTINUOUS PRN
Status: CANCELLED | OUTPATIENT
Start: 2023-07-06

## 2023-07-06 RX ORDER — TRANEXAMIC ACID 10 MG/ML
1 INJECTION, SOLUTION INTRAVENOUS ONCE
Status: CANCELLED | OUTPATIENT
Start: 2023-07-06 | End: 2023-07-06

## 2023-07-06 RX ORDER — SODIUM CHLORIDE, SODIUM LACTATE, POTASSIUM CHLORIDE, CALCIUM CHLORIDE 600; 310; 30; 20 MG/100ML; MG/100ML; MG/100ML; MG/100ML
INJECTION, SOLUTION INTRAVENOUS CONTINUOUS
Status: CANCELLED | OUTPATIENT
Start: 2023-07-06

## 2023-07-06 RX ORDER — NALOXONE HYDROCHLORIDE 0.4 MG/ML
0.2 INJECTION, SOLUTION INTRAMUSCULAR; INTRAVENOUS; SUBCUTANEOUS
Status: DISCONTINUED | OUTPATIENT
Start: 2023-07-06 | End: 2023-07-06 | Stop reason: HOSPADM

## 2023-07-06 RX ORDER — CEFAZOLIN SODIUM 2 G/100ML
2 INJECTION, SOLUTION INTRAVENOUS SEE ADMIN INSTRUCTIONS
Status: CANCELLED | OUTPATIENT
Start: 2023-07-06

## 2023-07-06 RX ORDER — FENTANYL CITRATE-0.9 % NACL/PF 10 MCG/ML
100 PLASTIC BAG, INJECTION (ML) INTRAVENOUS EVERY 5 MIN PRN
Status: DISCONTINUED | OUTPATIENT
Start: 2023-07-06 | End: 2023-07-06 | Stop reason: HOSPADM

## 2023-07-06 RX ORDER — FENTANYL CITRATE 50 UG/ML
25 INJECTION, SOLUTION INTRAMUSCULAR; INTRAVENOUS EVERY 5 MIN PRN
Status: CANCELLED | OUTPATIENT
Start: 2023-07-06

## 2023-07-06 RX ORDER — LABETALOL 20 MG/4 ML (5 MG/ML) INTRAVENOUS SYRINGE
10
Status: CANCELLED | OUTPATIENT
Start: 2023-07-06

## 2023-07-06 RX ORDER — CITRIC ACID/SODIUM CITRATE 334-500MG
30 SOLUTION, ORAL ORAL
Status: CANCELLED | OUTPATIENT
Start: 2023-07-06

## 2023-07-06 RX ORDER — FENTANYL CITRATE 50 UG/ML
50 INJECTION, SOLUTION INTRAMUSCULAR; INTRAVENOUS EVERY 5 MIN PRN
Status: CANCELLED | OUTPATIENT
Start: 2023-07-06

## 2023-07-06 RX ORDER — ONDANSETRON 4 MG/1
4 TABLET, ORALLY DISINTEGRATING ORAL EVERY 30 MIN PRN
Status: CANCELLED | OUTPATIENT
Start: 2023-07-06

## 2023-07-06 RX ORDER — LIDOCAINE 40 MG/G
CREAM TOPICAL
Status: CANCELLED | OUTPATIENT
Start: 2023-07-06

## 2023-07-06 RX ORDER — ALBUTEROL SULFATE 0.83 MG/ML
2.5 SOLUTION RESPIRATORY (INHALATION) EVERY 4 HOURS PRN
Status: CANCELLED | OUTPATIENT
Start: 2023-07-06

## 2023-07-06 RX ORDER — ONDANSETRON 2 MG/ML
4 INJECTION INTRAMUSCULAR; INTRAVENOUS EVERY 30 MIN PRN
Status: CANCELLED | OUTPATIENT
Start: 2023-07-06

## 2023-07-06 RX ORDER — CARBOPROST TROMETHAMINE 250 UG/ML
250 INJECTION, SOLUTION INTRAMUSCULAR
Status: CANCELLED | OUTPATIENT
Start: 2023-07-06

## 2023-07-06 RX ORDER — TRANEXAMIC ACID 10 MG/ML
1 INJECTION, SOLUTION INTRAVENOUS EVERY 30 MIN PRN
Status: CANCELLED | OUTPATIENT
Start: 2023-07-06

## 2023-07-06 RX ORDER — NALOXONE HYDROCHLORIDE 0.4 MG/ML
0.4 INJECTION, SOLUTION INTRAMUSCULAR; INTRAVENOUS; SUBCUTANEOUS
Status: DISCONTINUED | OUTPATIENT
Start: 2023-07-06 | End: 2023-07-06 | Stop reason: HOSPADM

## 2023-07-06 RX ORDER — OXYTOCIN/0.9 % SODIUM CHLORIDE 30/500 ML
340 PLASTIC BAG, INJECTION (ML) INTRAVENOUS CONTINUOUS PRN
Status: CANCELLED | OUTPATIENT
Start: 2023-07-06

## 2023-07-06 RX ORDER — CITRIC ACID/SODIUM CITRATE 334-500MG
30 SOLUTION, ORAL ORAL ONCE
Status: DISCONTINUED | OUTPATIENT
Start: 2023-07-06 | End: 2023-07-06 | Stop reason: HOSPADM

## 2023-07-06 RX ORDER — ACETAMINOPHEN 325 MG/1
975 TABLET ORAL ONCE
Status: CANCELLED | OUTPATIENT
Start: 2023-07-06 | End: 2023-07-06

## 2023-07-06 RX ORDER — NALBUPHINE HYDROCHLORIDE 10 MG/ML
2.5-5 INJECTION, SOLUTION INTRAMUSCULAR; INTRAVENOUS; SUBCUTANEOUS EVERY 6 HOURS PRN
Status: DISCONTINUED | OUTPATIENT
Start: 2023-07-06 | End: 2023-07-06 | Stop reason: HOSPADM

## 2023-07-06 RX ORDER — METHYLERGONOVINE MALEATE 0.2 MG/ML
200 INJECTION INTRAVENOUS
Status: CANCELLED | OUTPATIENT
Start: 2023-07-06

## 2023-07-06 RX ORDER — CEFAZOLIN SODIUM 2 G/100ML
2 INJECTION, SOLUTION INTRAVENOUS
Status: CANCELLED | OUTPATIENT
Start: 2023-07-06

## 2023-07-06 ASSESSMENT — PAIN SCALES - GENERAL: PAINLEVEL: NO PAIN (0)

## 2023-07-06 ASSESSMENT — ACTIVITIES OF DAILY LIVING (ADL): ADLS_ACUITY_SCORE: 33

## 2023-07-06 NOTE — PROGRESS NOTES
"CHIEF COMPLAINT / REASON FOR VISIT  Obstetric visit for pre-op.    SUBJECTIVE  Trinh Gonzales is a 22 year old  with No LMP recorded. Patient is pregnant.. Estimated Date of Delivery: Jul 15, 2023. Gestational age is 38w5d. She presents for OB visit pre-op.    She desires an elective primary  section.  She no longer desires induction of labor.  She feels like the fetal head is too large and she will not be able to have a successful vaginal delivery.  She is very anxious about a vaginal delivery and does not feel that her baby will fit through her pelvis as she has such a small stature. She has spent a lot of time considering her options and discuss them with her family.  She and her family desire an elective primary  section. She feels good fetal movement. She continues to have irregular contractions that resolve with rest. She denies leaking of fluid or vaginal bleeding. She denies abnormal vaginal discharge, difficulty urinating, fever or chills. No headache, vision changes, lower extremity swelling, upper abdominal pain, chest pain, or shortness of breath.  She denies depression symptoms on Lexapro and Hydroxyzine.  She does have anxiety as mentioned above.  She uses Metformin for polycystic ovarian syndrome.    ALLERGIES     Allergies   Allergen Reactions     Apple Juice      Gums/mouth gets itchy  Regular apples  Ok to have apple juice-just allergic to \"raw apples\" per patient     Lazar      Gums/mouth gets itchy     Pistachios [Nuts]        MEDICATIONS    Current Outpatient Medications:      acetaminophen (TYLENOL) 500 MG tablet, Take 500-1,000 mg by mouth every 6 hours as needed, Disp: , Rfl:      albuterol (PROAIR HFA/PROVENTIL HFA/VENTOLIN HFA) 108 (90 Base) MCG/ACT inhaler, Inhale 2 puffs into the lungs every 4 hours as needed, Disp: , Rfl:      escitalopram (LEXAPRO) 10 MG tablet, Take 1 tablet (10 mg) by mouth daily, Disp: 90 tablet, Rfl: 3     metFORMIN (GLUCOPHAGE) 500 MG " "tablet, TAKE 1 TABLET(500 MG) BY MOUTH TWICE DAILY WITH MEALS (Patient taking differently: Take 1,000 mg by mouth daily (with dinner)), Disp: 60 tablet, Rfl: 3     ondansetron (ZOFRAN ODT) 4 MG ODT tab, Take 1 tablet (4 mg) by mouth every 8 hours as needed for nausea or vomiting, Disp: 50 tablet, Rfl: 3     phenazopyridine (PYRIDIUM) 200 MG tablet, Take 1 tablet (200 mg) by mouth 3 times daily as needed for irritation, Disp: 6 tablet, Rfl: 0     Prenatal MV & Min w/FA-DHA (PRENATAL GUMMIES) 0.18-25 MG CHEW, , Disp: , Rfl:     REVIEW OF SYSTEMS  As per HPI, otherwise negative    The patient's Medical Hx, Surgical Hx, Obstetrical Hx, Social Hx, and Family Hx have been reviewed and updated in the electronic medical record.    OBJECTIVE  /63 (BP Location: Left arm, Cuff Size: Adult Regular)   Pulse 83   Ht 1.499 m (4' 11\")   Wt 56.2 kg (124 lb)   SpO2 98%   BMI 25.04 kg/m      General: Well-developed well-nourished gravid female in no apparent distress.  Neurological: Alert and oriented x3.  Lungs:  Clear to auscultation bilaterally with good inspiratory effort.  No wheezing rhonchi or rales noted. Breathing nonlabored.  Heart:  Regular rate and rhythm without murmur. No JVD.  No peripheral vascular disease.  Abdomen: Soft, gravid, non tender, positive bowel sounds. Fundal height at 32 cm. Fetal heart tones auscultated at 128.  Presentation noted to be cephalic by Leopold.  Cervix: 0.5 cm, 80 % effacement, -2 station, vertex presentation  Extremities:  No clubbing cyanosis or edema. Nontender bilaterally.    Chaperone: Lois Michelle RN    DIAGNOSTICS  OB Labs  Type and Rh: O Positive  ABS: Negative  Rubella: Positive  Treponema: Negative  HBsAg: Negative  Hepatitis C: Negative  HIV: Negative  Hgb: 10.1  Hematocrit: 30.6  Plt: 221  TSH: 1.17  Chlamydia: Negative  Gonorrhea: Negative  Ucx: negative  Glucose screen: 175  3 hour GTT 81, 161, 127, 125  GBS: Negative    01/12/2023 Pap NILM    03/01/2023 OB ultrasound: " 20 4/7 wk, 288 gm, 4%, cephalic presentation, anterior placenta, no placenta previa, normal BARBARA, normal fetal anatomy.  03/10/2023 OB level 2 ultrasound: 21 6/7 wk, 382 gm, 8%, breech presentation, anterior and fundal placenta, no placenta previa, normal BARBARA, fetal growth restriction, normal fetal anatomy.  2023 OB level 2 ultrasound: 24 6/7 wk, 598 gm, 5%, cephalic presentation, anterior and fundal placenta, no placenta previa, normal BARBARA, fetal growth restriction, normal fetal anatomy, umbilical artery doppler S/D ratio 1.36.  2023 OB ultrasound: 27 3/7 wk, 927 gm, 8.8%, cephalic presentation, anterior placenta, normal BARBARA, appropriate interval fetal growth, umbilical doppler S/D ratio 2.4.  05/15/2023 OB ultrasound: 30 6/7 wk, 1526 gm, 11%, cephalic presentation, anterior placenta, normal BARBARA, normal umbilical artery S/D ratio 3.2.   2023 OB ultrasound: 35 2/7 wk, 2440 gm, 26%, cephalic presentation, anterior placenta, normal BARBARA, normal umbilical artery S/D ratio 2.3- 3.1.   2023 Limited OB ultrasound: cephalic presentation, anterior placenta, heart rate 137 bpm, normal BARBARA, Biophysical profile (BPP) score 8/8, normal umbilical artery S/D ratio 2.2    ASSESSMENT / PLAN  Trinh Gonzales is a 22 year old  female who presents for pre-op.    1 Intrauterine pregnancy at 38w5d.  2 Desire elective primary  section  3 Fetal growth restriction  4 Depression and anxiety  5 EDC set by 9 wk ultrasound  6 Polycystic ovarian syndrome  7 Kidney stones first trimester (S/P Left ureteroscopy with laser lithotripsy and stent placement 2023)  8 Elevated glucose screen with normal 3 hour GTT    - [facility] recommends IOL at 38-39 wk as long as NST remains reactive/ reassuring, UA Doppler remains in normal limits and interval growth is stable on follow up ultrasounds.  - The patient is going to [facility] for NST.  - I discussed management options with the patient. She declines induction of labor  and desires elective primary  section. She is very anxious about a vaginal delivery and does not feel that her baby will fit through her pelvis as she has such a small stature.  - I discussed the risk, benefits, alternatives, indications, and expected outcomes of  section with the patient.  I reviewed the risk of bleeding, need for blood transfusion, risk of infection, injury to organs with need for repair, injury to uterus, need for hysterectomy, injury to fetus, anesthesia risk, deep vein thrombosis risk, unexpected findings, need for future  section deliveries, increased risk of placenta accreta and placenta previa in future pregnancies, and rarely death. I discussed the rare possibility of a  hysterectomy. She is agreeable to a blood transfusion if medically indicated and she is aware of the risks of a blood transfusion including HIV, Hepatitis B and Hepatitis C. I discussed the use of bilateral sequential compression devices. I discussed the routine recovery process, post-operative pain medication, use of a yin catheter and the anticipated hospital stay.  - She verbalized understanding and desired to proceed.  - The patient asked appropriate questions and these were answered for her.  - The consent form was reviewed and signed.    - The patient is scheduled for  section procedure on 2023.  - Preoperative instructions were discussed.  - The patient will be NPO after midnight.  - The patient is at low risk for intra-operative complications under expected regional regional anesthesia.    - Problem list reviewed and updated.  - Pregnancy weight gain has been 7.846 kg (17 lb 4.8 oz) to date, which is adequate.  - The patient plans to breast feed and she is undecided for contraception after delivery.  - I discussed use of Lexapro, Hydroxyzine, and Metformin in pregnancy.  - I recommend the patient see mental health counselor for depression and anxiety follow-up.  - I  reviewed depression precautions.  - I encouraged patient to follow-up with Urology as scheduled after delivery.  - I reviewed routine obstetrical precautions, recommendations and instructions with the patient including fetal movement and kick count instructions.  - I reviewed pre-eclampsia precautions with the patient with instructions for the patient to come in for persistent headache unrelieved with Tylenol, blurry vision, right upper quadrant pain, shortness of breath, or significant persistent edema.  - I reviewed labor precautions with the patient with instructions for the patient to come in for decreased fetal movement, suspected rupture of membranes, regular contraction pattern, vaginal bleeding or any other concerning symptoms.  - Follow up in 1 day for surgery or sooner as needed.    - 45 minutes spent on the date of the encounter doing chart review, review of test results, interpretation of tests, patient visit documentation with the majority of time spent counseling the patient and discussing the plan of care.      Tai Hoang MD  Obstetrics and Gynecology

## 2023-07-06 NOTE — ANESTHESIA PREPROCEDURE EVALUATION
"Anesthesia Pre-Procedure Evaluation    Patient: Trinh Gonzales   MRN: [id number] : 2000        Procedure : Procedure(s):   SECTION          Past Medical History:   Diagnosis Date     Adjustment disorder with depressed mood 2018     Adjustment disorder with mixed disturbance of emotions and conduct 2016     Anxiety 2019     Astigmatism 2010    Formatting of this note might be different from the original. IMO Update     Irregular menses 2022     Nephrolithiasis 2023    S/P Left ureteroscopy with laser lithotripsy and stent placement     PCOS (polycystic ovarian syndrome) 2022     Primary oligomenorrhea 2022     PTSD (post-traumatic stress disorder) 2018     Suicidal behavior 2016     [place], premature 2008    Overview:  Asymmetric.  Slight nipple enlargement on left side.  Right side has breast tissue which is probably 3-4 cm. IMO Update 10/11  Formatting of this note might be different from the original. Asymmetric.  Slight nipple enlargement on left side.  Right side has breast tissue which is probably 3-4 cm. IMO Update 10/11      Past Surgical History:   Procedure Laterality Date     COMBINED CYSTOSCOPY, URETEROSCOPY, LASER HOLMIUM LITHOTRIPSY URETER(S) Left 2023    Procedure: Left ureteroscopy with laser lithotripsy and stent placement;  Surgeon: James Hanley MD;  Location:  OR     COMBINED CYSTOSCOPY, URETEROSCOPY, LASER HOLMIUM LITHOTRIPSY URETER(S) Left 3/9/2023    Procedure: Left ureteroscopy with laser lithotripsy and stent placement;  Surgeon: James Hanley MD;  Location:  OR     WISDOM TOOTH EXTRACTION Bilateral       Allergies   Allergen Reactions     Apple Juice      Gums/mouth gets itchy  Regular apples  Ok to have apple juice-just allergic to \"raw apples\" per patient     Lazar      Gums/mouth gets itchy     Pistachios [Nuts]       Social History     Tobacco Use     Smoking status: Never     " Smokeless tobacco: Never   Substance Use Topics     Alcohol use: No      Wt Readings from Last 1 Encounters:   07/06/23 55.8 kg (123 lb)        Anesthesia Evaluation   Pt has had prior anesthetic.     No history of anesthetic complications       ROS/MED HX  ENT/Pulmonary:     (+) Intermittent, asthma     Neurologic:  - neg neurologic ROS     Cardiovascular:  - neg cardiovascular ROS     METS/Exercise Tolerance: >4 METS    Hematologic:  - neg hematologic  ROS     Musculoskeletal:  - neg musculoskeletal ROS     GI/Hepatic:     (+) GERD,     Renal/Genitourinary:     (+) Nephrolithiasis ,     Endo:  - neg endo ROS     Psychiatric/Substance Use:     (+) psychiatric history anxiety, depression and other (comment) (PTSD, adjustment disorder, suicidal behavior)     Infectious Disease:  - neg infectious disease ROS     Malignancy:  - neg malignancy ROS     Other:  - neg other ROS          Physical Exam    Airway  airway exam normal      Mallampati: I   TM distance: > 3 FB   Neck ROM: full   Mouth opening: > 3 cm    Respiratory Devices and Support         Dental       (+) Completely normal teeth      Cardiovascular   cardiovascular exam normal       Rhythm and rate: regular and normal     Pulmonary   pulmonary exam normal        breath sounds clear to auscultation           OUTSIDE LABS:  CBC:   Lab Results   Component Value Date    WBC 7.4 04/20/2023    WBC 8.2 01/18/2023    HGB 10.1 (L) 04/20/2023    HGB 12.8 01/18/2023    HCT 30.6 (L) 04/20/2023    HCT 36.8 01/18/2023     04/20/2023     01/18/2023     BMP:   Lab Results   Component Value Date     (L) 01/18/2023     08/06/2020    POTASSIUM 3.5 01/18/2023    POTASSIUM 3.8 08/06/2020    CHLORIDE 102 01/18/2023    CHLORIDE 109 08/06/2020    CO2 21 (L) 01/18/2023    CO2 26 08/06/2020    BUN 7.0 01/18/2023    BUN 10 08/06/2020    CR 0.47 (L) 01/18/2023    CR 0.68 08/06/2020    GLC 76 01/24/2023    GLC 89 01/18/2023     COAGS: No results found for: PTT,  INR, FIBR  POC:   Lab Results   Component Value Date    HCG Negative 09/02/2021    HCGS Negative 06/15/2021     HEPATIC:   Lab Results   Component Value Date    ALBUMIN 4.2 08/06/2020    PROTTOTAL 7.7 08/06/2020    ALT 25 08/06/2020    AST 19 08/06/2020    ALKPHOS 72 08/06/2020    BILITOTAL 1.4 (H) 08/06/2020     OTHER:   Lab Results   Component Value Date    CLARITZA 9.3 01/18/2023    TSH 1.17 01/12/2023    T4 0.90 06/15/2021       Anesthesia Plan    ASA Status:  2   NPO Status:  NPO Appropriate    Anesthesia Type: Spinal.              Consents    Anesthesia Plan(s) and associated risks, benefits, and realistic alternatives discussed. Questions answered and patient/representative(s) expressed understanding.     - Discussed: Risks, Benefits and Alternatives for BOTH SEDATION and the PROCEDURE were discussed     - Discussed with:  Patient      - Extended Intubation/Ventilatory Support Discussed: No.      - Patient is DNR/DNI Status: No    Use of blood products discussed: No .     Postoperative Care            Comments:    Other Comments:  7/6/23    Discussed risks and benefits of spinal anesthetic with patient including itching, sore back, infection, hematoma, spinal headache, CV complications, nerve damage, inability to place, conversion to general anesthesia, loss of airway, and death. Pt wishes to proceed.             RONEL Bradford CRNA

## 2023-07-06 NOTE — DISCHARGE INSTRUCTIONS

## 2023-07-06 NOTE — PROGRESS NOTES
Trinh Gonzales    NST:  reactive  Start: 905   Stop: 930    Physician: Dr. Hoang  Reason For Test: IUGR  Estimated Date of Delivery: Jul 15, 2023   Gestational Age: 38w5d     Verified with second RN: Meghann VILLALTA    Comments:  MD approved for discharge.  Touring patient and educating on scheduled  tomorrow.  AVS given and signed.    Geetha Garcia RN

## 2023-07-07 ENCOUNTER — APPOINTMENT (OUTPATIENT)
Dept: ULTRASOUND IMAGING | Facility: HOSPITAL | Age: 23
End: 2023-07-07
Attending: NURSE ANESTHETIST, CERTIFIED REGISTERED
Payer: COMMERCIAL

## 2023-07-07 ENCOUNTER — TRANSFERRED RECORDS (OUTPATIENT)
Dept: HEALTH INFORMATION MANAGEMENT | Facility: CLINIC | Age: 23
End: 2023-07-07

## 2023-07-07 ENCOUNTER — HOSPITAL ENCOUNTER (INPATIENT)
Facility: HOSPITAL | Age: 23
LOS: 3 days | Discharge: HOME | End: 2023-07-10
Attending: OBSTETRICS & GYNECOLOGY | Admitting: OBSTETRICS & GYNECOLOGY
Payer: COMMERCIAL

## 2023-07-07 ENCOUNTER — ANESTHESIA (OUTPATIENT)
Dept: SURGERY | Facility: HOSPITAL | Age: 23
End: 2023-07-07
Payer: COMMERCIAL

## 2023-07-07 DIAGNOSIS — O36.5920 POOR FETAL GROWTH AFFECTING MANAGEMENT OF MOTHER IN SECOND TRIMESTER, SINGLE OR UNSPECIFIED FETUS: ICD-10-CM

## 2023-07-07 DIAGNOSIS — Z34.03 PREGNANCY, SUPERVISION OF FIRST, THIRD TRIMESTER: ICD-10-CM

## 2023-07-07 LAB
ABO/RH(D): NORMAL
ALBUMIN SERPL BCG-MCNC: 3.3 G/DL (ref 3.5–5.2)
ALP SERPL-CCNC: 155 U/L (ref 35–104)
ALT SERPL W P-5'-P-CCNC: 7 U/L (ref 0–50)
ANION GAP SERPL CALCULATED.3IONS-SCNC: 11 MMOL/L (ref 7–15)
ANTIBODY SCREEN: NEGATIVE
AST SERPL W P-5'-P-CCNC: 20 U/L (ref 0–45)
BILIRUB SERPL-MCNC: 0.6 MG/DL
BUN SERPL-MCNC: 6.2 MG/DL (ref 6–20)
CALCIUM SERPL-MCNC: 8.7 MG/DL (ref 8.6–10)
CHLORIDE SERPL-SCNC: 103 MMOL/L (ref 98–107)
CREAT SERPL-MCNC: 0.54 MG/DL (ref 0.51–0.95)
DEPRECATED HCO3 PLAS-SCNC: 21 MMOL/L (ref 22–29)
ERYTHROCYTE [DISTWIDTH] IN BLOOD BY AUTOMATED COUNT: 14.1 % (ref 10–15)
GFR SERPL CREATININE-BSD FRML MDRD: >90 ML/MIN/1.73M2
GLUCOSE SERPL-MCNC: 86 MG/DL (ref 70–99)
HCT VFR BLD AUTO: 28.7 % (ref 35–47)
HGB BLD-MCNC: 9.1 G/DL (ref 11.7–15.7)
MCH RBC QN AUTO: 28.5 PG (ref 26.5–33)
MCHC RBC AUTO-ENTMCNC: 31.7 G/DL (ref 31.5–36.5)
MCV RBC AUTO: 90 FL (ref 78–100)
PLATELET # BLD AUTO: 178 10E3/UL (ref 150–450)
POTASSIUM SERPL-SCNC: 3.5 MMOL/L (ref 3.4–5.3)
PROT SERPL-MCNC: 5.8 G/DL (ref 6.4–8.3)
RBC # BLD AUTO: 3.19 10E6/UL (ref 3.8–5.2)
SODIUM SERPL-SCNC: 135 MMOL/L (ref 136–145)
SPECIMEN EXPIRATION DATE: NORMAL
T PALLIDUM AB SER QL: NONREACTIVE
WBC # BLD AUTO: 7.7 10E3/UL (ref 4–11)

## 2023-07-07 PROCEDURE — 86901 BLOOD TYPING SEROLOGIC RH(D): CPT | Performed by: OBSTETRICS & GYNECOLOGY

## 2023-07-07 PROCEDURE — 272N000001 HC OR GENERAL SUPPLY STERILE: Performed by: OBSTETRICS & GYNECOLOGY

## 2023-07-07 PROCEDURE — C9290 INJ, BUPIVACAINE LIPOSOME: HCPCS | Performed by: NURSE ANESTHETIST, CERTIFIED REGISTERED

## 2023-07-07 PROCEDURE — 250N000011 HC RX IP 250 OP 636: Performed by: OBSTETRICS & GYNECOLOGY

## 2023-07-07 PROCEDURE — 258N000003 HC RX IP 258 OP 636: Performed by: OBSTETRICS & GYNECOLOGY

## 2023-07-07 PROCEDURE — 86850 RBC ANTIBODY SCREEN: CPT | Performed by: OBSTETRICS & GYNECOLOGY

## 2023-07-07 PROCEDURE — 370N000017 HC ANESTHESIA TECHNICAL FEE, PER MIN: Performed by: OBSTETRICS & GYNECOLOGY

## 2023-07-07 PROCEDURE — 360N000076 HC SURGERY LEVEL 3, PER MIN: Performed by: OBSTETRICS & GYNECOLOGY

## 2023-07-07 PROCEDURE — 86780 TREPONEMA PALLIDUM: CPT | Performed by: OBSTETRICS & GYNECOLOGY

## 2023-07-07 PROCEDURE — 258N000003 HC RX IP 258 OP 636: Performed by: NURSE ANESTHETIST, CERTIFIED REGISTERED

## 2023-07-07 PROCEDURE — 59510 CESAREAN DELIVERY: CPT | Performed by: NURSE ANESTHETIST, CERTIFIED REGISTERED

## 2023-07-07 PROCEDURE — 36415 COLL VENOUS BLD VENIPUNCTURE: CPT | Performed by: OBSTETRICS & GYNECOLOGY

## 2023-07-07 PROCEDURE — 59510 CESAREAN DELIVERY: CPT | Performed by: OBSTETRICS & GYNECOLOGY

## 2023-07-07 PROCEDURE — 120N000001 HC R&B MED SURG/OB

## 2023-07-07 PROCEDURE — 250N000009 HC RX 250: Performed by: NURSE ANESTHETIST, CERTIFIED REGISTERED

## 2023-07-07 PROCEDURE — 85027 COMPLETE CBC AUTOMATED: CPT | Performed by: OBSTETRICS & GYNECOLOGY

## 2023-07-07 PROCEDURE — 64488 TAP BLOCK BI INJECTION: CPT | Mod: XU | Performed by: NURSE ANESTHETIST, CERTIFIED REGISTERED

## 2023-07-07 PROCEDURE — 250N000009 HC RX 250: Performed by: OBSTETRICS & GYNECOLOGY

## 2023-07-07 PROCEDURE — 250N000011 HC RX IP 250 OP 636: Mod: JZ | Performed by: NURSE ANESTHETIST, CERTIFIED REGISTERED

## 2023-07-07 PROCEDURE — 80053 COMPREHEN METABOLIC PANEL: CPT | Performed by: OBSTETRICS & GYNECOLOGY

## 2023-07-07 PROCEDURE — 710N000010 HC RECOVERY PHASE 1, LEVEL 2, PER MIN: Performed by: OBSTETRICS & GYNECOLOGY

## 2023-07-07 PROCEDURE — 250N000013 HC RX MED GY IP 250 OP 250 PS 637: Performed by: OBSTETRICS & GYNECOLOGY

## 2023-07-07 RX ORDER — CARBOPROST TROMETHAMINE 250 UG/ML
250 INJECTION, SOLUTION INTRAMUSCULAR
Status: DISCONTINUED | OUTPATIENT
Start: 2023-07-07 | End: 2023-07-07

## 2023-07-07 RX ORDER — NALOXONE HYDROCHLORIDE 0.4 MG/ML
0.2 INJECTION, SOLUTION INTRAMUSCULAR; INTRAVENOUS; SUBCUTANEOUS
Status: DISCONTINUED | OUTPATIENT
Start: 2023-07-07 | End: 2023-07-10 | Stop reason: HOSPADM

## 2023-07-07 RX ORDER — CITRIC ACID/SODIUM CITRATE 334-500MG
30 SOLUTION, ORAL ORAL
Status: COMPLETED | OUTPATIENT
Start: 2023-07-07 | End: 2023-07-07

## 2023-07-07 RX ORDER — CARBOPROST TROMETHAMINE 250 UG/ML
250 INJECTION, SOLUTION INTRAMUSCULAR
Status: DISCONTINUED | OUTPATIENT
Start: 2023-07-07 | End: 2023-07-07 | Stop reason: HOSPADM

## 2023-07-07 RX ORDER — OXYTOCIN/0.9 % SODIUM CHLORIDE 30/500 ML
1-24 PLASTIC BAG, INJECTION (ML) INTRAVENOUS CONTINUOUS
Status: DISCONTINUED | OUTPATIENT
Start: 2023-07-07 | End: 2023-07-07

## 2023-07-07 RX ORDER — PROCHLORPERAZINE MALEATE 10 MG
10 TABLET ORAL EVERY 6 HOURS PRN
Status: DISCONTINUED | OUTPATIENT
Start: 2023-07-07 | End: 2023-07-07

## 2023-07-07 RX ORDER — DIPHENHYDRAMINE HCL 25 MG
25 CAPSULE ORAL EVERY 6 HOURS PRN
Status: DISCONTINUED | OUTPATIENT
Start: 2023-07-07 | End: 2023-07-10 | Stop reason: HOSPADM

## 2023-07-07 RX ORDER — OXYCODONE HYDROCHLORIDE 5 MG/1
5 TABLET ORAL
Status: DISCONTINUED | OUTPATIENT
Start: 2023-07-07 | End: 2023-07-07

## 2023-07-07 RX ORDER — OXYTOCIN 10 [USP'U]/ML
10 INJECTION, SOLUTION INTRAMUSCULAR; INTRAVENOUS
Status: DISCONTINUED | OUTPATIENT
Start: 2023-07-07 | End: 2023-07-07

## 2023-07-07 RX ORDER — LIDOCAINE 40 MG/G
CREAM TOPICAL
Status: DISCONTINUED | OUTPATIENT
Start: 2023-07-07 | End: 2023-07-07

## 2023-07-07 RX ORDER — NALOXONE HYDROCHLORIDE 0.4 MG/ML
0.2 INJECTION, SOLUTION INTRAMUSCULAR; INTRAVENOUS; SUBCUTANEOUS
Status: DISCONTINUED | OUTPATIENT
Start: 2023-07-07 | End: 2023-07-07

## 2023-07-07 RX ORDER — BISACODYL 10 MG
10 SUPPOSITORY, RECTAL RECTAL DAILY PRN
Status: DISCONTINUED | OUTPATIENT
Start: 2023-07-09 | End: 2023-07-10 | Stop reason: HOSPADM

## 2023-07-07 RX ORDER — TRANEXAMIC ACID 10 MG/ML
1 INJECTION, SOLUTION INTRAVENOUS ONCE
Status: DISCONTINUED | OUTPATIENT
Start: 2023-07-07 | End: 2023-07-07 | Stop reason: HOSPADM

## 2023-07-07 RX ORDER — ACETAMINOPHEN 325 MG/1
975 TABLET ORAL EVERY 6 HOURS
Status: DISCONTINUED | OUTPATIENT
Start: 2023-07-07 | End: 2023-07-10 | Stop reason: HOSPADM

## 2023-07-07 RX ORDER — OXYTOCIN/0.9 % SODIUM CHLORIDE 30/500 ML
PLASTIC BAG, INJECTION (ML) INTRAVENOUS PRN
Status: DISCONTINUED | OUTPATIENT
Start: 2023-07-07 | End: 2023-07-07

## 2023-07-07 RX ORDER — PROCHLORPERAZINE 25 MG
25 SUPPOSITORY, RECTAL RECTAL EVERY 12 HOURS PRN
Status: DISCONTINUED | OUTPATIENT
Start: 2023-07-07 | End: 2023-07-07

## 2023-07-07 RX ORDER — IBUPROFEN 800 MG/1
800 TABLET, FILM COATED ORAL EVERY 6 HOURS
Status: DISCONTINUED | OUTPATIENT
Start: 2023-07-09 | End: 2023-07-09

## 2023-07-07 RX ORDER — CARBOPROST TROMETHAMINE 250 UG/ML
250 INJECTION, SOLUTION INTRAMUSCULAR
Status: DISCONTINUED | OUTPATIENT
Start: 2023-07-07 | End: 2023-07-10 | Stop reason: HOSPADM

## 2023-07-07 RX ORDER — ONDANSETRON 4 MG/1
4 TABLET, ORALLY DISINTEGRATING ORAL EVERY 30 MIN PRN
Status: DISCONTINUED | OUTPATIENT
Start: 2023-07-07 | End: 2023-07-07 | Stop reason: HOSPADM

## 2023-07-07 RX ORDER — METOCLOPRAMIDE HYDROCHLORIDE 5 MG/ML
10 INJECTION INTRAMUSCULAR; INTRAVENOUS EVERY 6 HOURS PRN
Status: DISCONTINUED | OUTPATIENT
Start: 2023-07-07 | End: 2023-07-10 | Stop reason: HOSPADM

## 2023-07-07 RX ORDER — ONDANSETRON 2 MG/ML
INJECTION INTRAMUSCULAR; INTRAVENOUS PRN
Status: DISCONTINUED | OUTPATIENT
Start: 2023-07-07 | End: 2023-07-07

## 2023-07-07 RX ORDER — LABETALOL 20 MG/4 ML (5 MG/ML) INTRAVENOUS SYRINGE
10
Status: DISCONTINUED | OUTPATIENT
Start: 2023-07-07 | End: 2023-07-07 | Stop reason: HOSPADM

## 2023-07-07 RX ORDER — LIDOCAINE 40 MG/G
CREAM TOPICAL
Status: DISCONTINUED | OUTPATIENT
Start: 2023-07-07 | End: 2023-07-07 | Stop reason: HOSPADM

## 2023-07-07 RX ORDER — METHYLERGONOVINE MALEATE 0.2 MG/ML
200 INJECTION INTRAVENOUS
Status: DISCONTINUED | OUTPATIENT
Start: 2023-07-07 | End: 2023-07-07 | Stop reason: HOSPADM

## 2023-07-07 RX ORDER — PROCHLORPERAZINE 25 MG
25 SUPPOSITORY, RECTAL RECTAL EVERY 12 HOURS PRN
Status: DISCONTINUED | OUTPATIENT
Start: 2023-07-07 | End: 2023-07-10 | Stop reason: HOSPADM

## 2023-07-07 RX ORDER — KETOROLAC TROMETHAMINE 30 MG/ML
INJECTION, SOLUTION INTRAMUSCULAR; INTRAVENOUS PRN
Status: DISCONTINUED | OUTPATIENT
Start: 2023-07-07 | End: 2023-07-07

## 2023-07-07 RX ORDER — ACETAMINOPHEN 325 MG/1
650 TABLET ORAL EVERY 4 HOURS PRN
Status: DISCONTINUED | OUTPATIENT
Start: 2023-07-07 | End: 2023-07-07

## 2023-07-07 RX ORDER — DIPHENHYDRAMINE HYDROCHLORIDE 50 MG/ML
25 INJECTION INTRAMUSCULAR; INTRAVENOUS EVERY 6 HOURS PRN
Status: DISCONTINUED | OUTPATIENT
Start: 2023-07-07 | End: 2023-07-10 | Stop reason: HOSPADM

## 2023-07-07 RX ORDER — AMOXICILLIN 250 MG
1 CAPSULE ORAL 2 TIMES DAILY
Status: DISCONTINUED | OUTPATIENT
Start: 2023-07-07 | End: 2023-07-10 | Stop reason: HOSPADM

## 2023-07-07 RX ORDER — TERBUTALINE SULFATE 1 MG/ML
0.25 INJECTION, SOLUTION SUBCUTANEOUS
Status: DISCONTINUED | OUTPATIENT
Start: 2023-07-07 | End: 2023-07-07

## 2023-07-07 RX ORDER — NALOXONE HYDROCHLORIDE 0.4 MG/ML
0.4 INJECTION, SOLUTION INTRAMUSCULAR; INTRAVENOUS; SUBCUTANEOUS
Status: DISCONTINUED | OUTPATIENT
Start: 2023-07-07 | End: 2023-07-07

## 2023-07-07 RX ORDER — ONDANSETRON 4 MG/1
4 TABLET, ORALLY DISINTEGRATING ORAL EVERY 6 HOURS PRN
Status: DISCONTINUED | OUTPATIENT
Start: 2023-07-07 | End: 2023-07-10 | Stop reason: HOSPADM

## 2023-07-07 RX ORDER — SODIUM CHLORIDE, SODIUM LACTATE, POTASSIUM CHLORIDE, CALCIUM CHLORIDE 600; 310; 30; 20 MG/100ML; MG/100ML; MG/100ML; MG/100ML
INJECTION, SOLUTION INTRAVENOUS CONTINUOUS
Status: DISCONTINUED | OUTPATIENT
Start: 2023-07-07 | End: 2023-07-07

## 2023-07-07 RX ORDER — MISOPROSTOL 200 UG/1
800 TABLET ORAL
Status: DISCONTINUED | OUTPATIENT
Start: 2023-07-07 | End: 2023-07-07 | Stop reason: HOSPADM

## 2023-07-07 RX ORDER — LIDOCAINE 40 MG/G
CREAM TOPICAL
Status: DISCONTINUED | OUTPATIENT
Start: 2023-07-07 | End: 2023-07-10 | Stop reason: HOSPADM

## 2023-07-07 RX ORDER — OXYTOCIN/0.9 % SODIUM CHLORIDE 30/500 ML
340 PLASTIC BAG, INJECTION (ML) INTRAVENOUS CONTINUOUS PRN
Status: DISCONTINUED | OUTPATIENT
Start: 2023-07-07 | End: 2023-07-10 | Stop reason: HOSPADM

## 2023-07-07 RX ORDER — EPHEDRINE SULFATE 50 MG/ML
INJECTION, SOLUTION INTRAMUSCULAR; INTRAVENOUS; SUBCUTANEOUS PRN
Status: DISCONTINUED | OUTPATIENT
Start: 2023-07-07 | End: 2023-07-07

## 2023-07-07 RX ORDER — KETOROLAC TROMETHAMINE 30 MG/ML
30 INJECTION, SOLUTION INTRAMUSCULAR; INTRAVENOUS
Status: DISCONTINUED | OUTPATIENT
Start: 2023-07-07 | End: 2023-07-07

## 2023-07-07 RX ORDER — ALBUTEROL SULFATE 0.83 MG/ML
2.5 SOLUTION RESPIRATORY (INHALATION) EVERY 4 HOURS PRN
Status: DISCONTINUED | OUTPATIENT
Start: 2023-07-07 | End: 2023-07-07 | Stop reason: HOSPADM

## 2023-07-07 RX ORDER — METOCLOPRAMIDE 10 MG/1
10 TABLET ORAL EVERY 6 HOURS PRN
Status: DISCONTINUED | OUTPATIENT
Start: 2023-07-07 | End: 2023-07-10 | Stop reason: HOSPADM

## 2023-07-07 RX ORDER — DEXTROSE, SODIUM CHLORIDE, SODIUM LACTATE, POTASSIUM CHLORIDE, AND CALCIUM CHLORIDE 5; .6; .31; .03; .02 G/100ML; G/100ML; G/100ML; G/100ML; G/100ML
INJECTION, SOLUTION INTRAVENOUS CONTINUOUS
Status: DISCONTINUED | OUTPATIENT
Start: 2023-07-07 | End: 2023-07-10 | Stop reason: HOSPADM

## 2023-07-07 RX ORDER — PROCHLORPERAZINE MALEATE 10 MG
10 TABLET ORAL EVERY 6 HOURS PRN
Status: DISCONTINUED | OUTPATIENT
Start: 2023-07-07 | End: 2023-07-10 | Stop reason: HOSPADM

## 2023-07-07 RX ORDER — ONDANSETRON 2 MG/ML
4 INJECTION INTRAMUSCULAR; INTRAVENOUS EVERY 30 MIN PRN
Status: DISCONTINUED | OUTPATIENT
Start: 2023-07-07 | End: 2023-07-07 | Stop reason: HOSPADM

## 2023-07-07 RX ORDER — SODIUM CHLORIDE, SODIUM LACTATE, POTASSIUM CHLORIDE, CALCIUM CHLORIDE 600; 310; 30; 20 MG/100ML; MG/100ML; MG/100ML; MG/100ML
INJECTION, SOLUTION INTRAVENOUS CONTINUOUS
Status: DISCONTINUED | OUTPATIENT
Start: 2023-07-07 | End: 2023-07-07 | Stop reason: HOSPADM

## 2023-07-07 RX ORDER — CEFAZOLIN SODIUM/WATER 2 G/20 ML
2 SYRINGE (ML) INTRAVENOUS
Status: COMPLETED | OUTPATIENT
Start: 2023-07-07 | End: 2023-07-07

## 2023-07-07 RX ORDER — BUPIVACAINE HYDROCHLORIDE 7.5 MG/ML
INJECTION, SOLUTION INTRASPINAL
Status: COMPLETED | OUTPATIENT
Start: 2023-07-07 | End: 2023-07-07

## 2023-07-07 RX ORDER — AMOXICILLIN 250 MG
2 CAPSULE ORAL 2 TIMES DAILY
Status: DISCONTINUED | OUTPATIENT
Start: 2023-07-07 | End: 2023-07-10 | Stop reason: HOSPADM

## 2023-07-07 RX ORDER — HYDROCORTISONE 25 MG/G
CREAM TOPICAL 3 TIMES DAILY PRN
Status: DISCONTINUED | OUTPATIENT
Start: 2023-07-07 | End: 2023-07-10 | Stop reason: HOSPADM

## 2023-07-07 RX ORDER — MISOPROSTOL 200 UG/1
400 TABLET ORAL
Status: DISCONTINUED | OUTPATIENT
Start: 2023-07-07 | End: 2023-07-07 | Stop reason: HOSPADM

## 2023-07-07 RX ORDER — NALOXONE HYDROCHLORIDE 0.4 MG/ML
0.4 INJECTION, SOLUTION INTRAMUSCULAR; INTRAVENOUS; SUBCUTANEOUS
Status: DISCONTINUED | OUTPATIENT
Start: 2023-07-07 | End: 2023-07-10 | Stop reason: HOSPADM

## 2023-07-07 RX ORDER — OXYTOCIN/0.9 % SODIUM CHLORIDE 30/500 ML
100-340 PLASTIC BAG, INJECTION (ML) INTRAVENOUS CONTINUOUS PRN
Status: DISCONTINUED | OUTPATIENT
Start: 2023-07-07 | End: 2023-07-07

## 2023-07-07 RX ORDER — METHYLERGONOVINE MALEATE 0.2 MG/ML
200 INJECTION INTRAVENOUS
Status: DISCONTINUED | OUTPATIENT
Start: 2023-07-07 | End: 2023-07-07

## 2023-07-07 RX ORDER — ENOXAPARIN SODIUM 100 MG/ML
40 INJECTION SUBCUTANEOUS EVERY 24 HOURS
Status: DISCONTINUED | OUTPATIENT
Start: 2023-07-07 | End: 2023-07-10 | Stop reason: HOSPADM

## 2023-07-07 RX ORDER — OXYTOCIN/0.9 % SODIUM CHLORIDE 30/500 ML
340 PLASTIC BAG, INJECTION (ML) INTRAVENOUS CONTINUOUS PRN
Status: DISCONTINUED | OUTPATIENT
Start: 2023-07-07 | End: 2023-07-07

## 2023-07-07 RX ORDER — METOCLOPRAMIDE HYDROCHLORIDE 5 MG/ML
10 INJECTION INTRAMUSCULAR; INTRAVENOUS EVERY 6 HOURS PRN
Status: DISCONTINUED | OUTPATIENT
Start: 2023-07-07 | End: 2023-07-07

## 2023-07-07 RX ORDER — CEFAZOLIN SODIUM/WATER 2 G/20 ML
2 SYRINGE (ML) INTRAVENOUS SEE ADMIN INSTRUCTIONS
Status: DISCONTINUED | OUTPATIENT
Start: 2023-07-07 | End: 2023-07-07 | Stop reason: HOSPADM

## 2023-07-07 RX ORDER — MISOPROSTOL 200 UG/1
400 TABLET ORAL
Status: DISCONTINUED | OUTPATIENT
Start: 2023-07-07 | End: 2023-07-07

## 2023-07-07 RX ORDER — OXYTOCIN 10 [USP'U]/ML
10 INJECTION, SOLUTION INTRAMUSCULAR; INTRAVENOUS
Status: DISCONTINUED | OUTPATIENT
Start: 2023-07-07 | End: 2023-07-10 | Stop reason: HOSPADM

## 2023-07-07 RX ORDER — MODIFIED LANOLIN
OINTMENT (GRAM) TOPICAL
Status: DISCONTINUED | OUTPATIENT
Start: 2023-07-07 | End: 2023-07-10 | Stop reason: HOSPADM

## 2023-07-07 RX ORDER — IBUPROFEN 800 MG/1
800 TABLET, FILM COATED ORAL
Status: DISCONTINUED | OUTPATIENT
Start: 2023-07-07 | End: 2023-07-07

## 2023-07-07 RX ORDER — TRANEXAMIC ACID 10 MG/ML
1 INJECTION, SOLUTION INTRAVENOUS EVERY 30 MIN PRN
Status: DISCONTINUED | OUTPATIENT
Start: 2023-07-07 | End: 2023-07-07

## 2023-07-07 RX ORDER — TRANEXAMIC ACID 10 MG/ML
1 INJECTION, SOLUTION INTRAVENOUS EVERY 30 MIN PRN
Status: DISCONTINUED | OUTPATIENT
Start: 2023-07-07 | End: 2023-07-10 | Stop reason: HOSPADM

## 2023-07-07 RX ORDER — MISOPROSTOL 200 UG/1
400 TABLET ORAL
Status: DISCONTINUED | OUTPATIENT
Start: 2023-07-07 | End: 2023-07-10 | Stop reason: HOSPADM

## 2023-07-07 RX ORDER — METHYLERGONOVINE MALEATE 0.2 MG/ML
INJECTION INTRAVENOUS PRN
Status: DISCONTINUED | OUTPATIENT
Start: 2023-07-07 | End: 2023-07-07

## 2023-07-07 RX ORDER — METHYLERGONOVINE MALEATE 0.2 MG/ML
200 INJECTION INTRAVENOUS
Status: DISCONTINUED | OUTPATIENT
Start: 2023-07-07 | End: 2023-07-10 | Stop reason: HOSPADM

## 2023-07-07 RX ORDER — TRANEXAMIC ACID 10 MG/ML
1 INJECTION, SOLUTION INTRAVENOUS EVERY 30 MIN PRN
Status: DISCONTINUED | OUTPATIENT
Start: 2023-07-07 | End: 2023-07-07 | Stop reason: HOSPADM

## 2023-07-07 RX ORDER — BUPIVACAINE HYDROCHLORIDE 5 MG/ML
INJECTION, SOLUTION EPIDURAL; INTRACAUDAL
Status: DISCONTINUED | OUTPATIENT
Start: 2023-07-07 | End: 2023-07-07

## 2023-07-07 RX ORDER — KETOROLAC TROMETHAMINE 30 MG/ML
30 INJECTION, SOLUTION INTRAMUSCULAR; INTRAVENOUS EVERY 6 HOURS
Status: DISPENSED | OUTPATIENT
Start: 2023-07-07 | End: 2023-07-09

## 2023-07-07 RX ORDER — DEXAMETHASONE SODIUM PHOSPHATE 4 MG/ML
INJECTION, SOLUTION INTRA-ARTICULAR; INTRALESIONAL; INTRAMUSCULAR; INTRAVENOUS; SOFT TISSUE PRN
Status: DISCONTINUED | OUTPATIENT
Start: 2023-07-07 | End: 2023-07-07

## 2023-07-07 RX ORDER — METOCLOPRAMIDE 10 MG/1
10 TABLET ORAL EVERY 6 HOURS PRN
Status: DISCONTINUED | OUTPATIENT
Start: 2023-07-07 | End: 2023-07-07

## 2023-07-07 RX ORDER — SIMETHICONE 80 MG
80 TABLET,CHEWABLE ORAL 4 TIMES DAILY PRN
Status: DISCONTINUED | OUTPATIENT
Start: 2023-07-07 | End: 2023-07-10 | Stop reason: HOSPADM

## 2023-07-07 RX ORDER — ONDANSETRON 2 MG/ML
4 INJECTION INTRAMUSCULAR; INTRAVENOUS EVERY 6 HOURS PRN
Status: DISCONTINUED | OUTPATIENT
Start: 2023-07-07 | End: 2023-07-10 | Stop reason: HOSPADM

## 2023-07-07 RX ORDER — ACETAMINOPHEN 325 MG/1
975 TABLET ORAL ONCE
Status: COMPLETED | OUTPATIENT
Start: 2023-07-07 | End: 2023-07-07

## 2023-07-07 RX ORDER — OXYTOCIN 10 [USP'U]/ML
10 INJECTION, SOLUTION INTRAMUSCULAR; INTRAVENOUS
Status: DISCONTINUED | OUTPATIENT
Start: 2023-07-07 | End: 2023-07-07 | Stop reason: HOSPADM

## 2023-07-07 RX ORDER — OXYCODONE HYDROCHLORIDE 5 MG/1
5 TABLET ORAL EVERY 4 HOURS PRN
Status: DISCONTINUED | OUTPATIENT
Start: 2023-07-07 | End: 2023-07-10 | Stop reason: HOSPADM

## 2023-07-07 RX ORDER — ONDANSETRON 2 MG/ML
4 INJECTION INTRAMUSCULAR; INTRAVENOUS EVERY 6 HOURS PRN
Status: DISCONTINUED | OUTPATIENT
Start: 2023-07-07 | End: 2023-07-07

## 2023-07-07 RX ORDER — FENTANYL CITRATE 50 UG/ML
25 INJECTION, SOLUTION INTRAMUSCULAR; INTRAVENOUS EVERY 5 MIN PRN
Status: DISCONTINUED | OUTPATIENT
Start: 2023-07-07 | End: 2023-07-07 | Stop reason: HOSPADM

## 2023-07-07 RX ORDER — SODIUM CHLORIDE, SODIUM LACTATE, POTASSIUM CHLORIDE, CALCIUM CHLORIDE 600; 310; 30; 20 MG/100ML; MG/100ML; MG/100ML; MG/100ML
INJECTION, SOLUTION INTRAVENOUS CONTINUOUS PRN
Status: DISCONTINUED | OUTPATIENT
Start: 2023-07-07 | End: 2023-07-07

## 2023-07-07 RX ORDER — DEXMEDETOMIDINE HYDROCHLORIDE 4 UG/ML
INJECTION, SOLUTION INTRAVENOUS PRN
Status: DISCONTINUED | OUTPATIENT
Start: 2023-07-07 | End: 2023-07-07

## 2023-07-07 RX ORDER — ONDANSETRON 4 MG/1
4 TABLET, ORALLY DISINTEGRATING ORAL EVERY 6 HOURS PRN
Status: DISCONTINUED | OUTPATIENT
Start: 2023-07-07 | End: 2023-07-07

## 2023-07-07 RX ORDER — OXYTOCIN/0.9 % SODIUM CHLORIDE 30/500 ML
100-340 PLASTIC BAG, INJECTION (ML) INTRAVENOUS CONTINUOUS PRN
Status: DISCONTINUED | OUTPATIENT
Start: 2023-07-07 | End: 2023-07-10 | Stop reason: HOSPADM

## 2023-07-07 RX ORDER — CITRIC ACID/SODIUM CITRATE 334-500MG
30 SOLUTION, ORAL ORAL
Status: DISCONTINUED | OUTPATIENT
Start: 2023-07-07 | End: 2023-07-07

## 2023-07-07 RX ORDER — FENTANYL CITRATE 50 UG/ML
50 INJECTION, SOLUTION INTRAMUSCULAR; INTRAVENOUS EVERY 5 MIN PRN
Status: DISCONTINUED | OUTPATIENT
Start: 2023-07-07 | End: 2023-07-07 | Stop reason: HOSPADM

## 2023-07-07 RX ORDER — ESCITALOPRAM OXALATE 10 MG/1
10 TABLET ORAL DAILY
Status: DISCONTINUED | OUTPATIENT
Start: 2023-07-07 | End: 2023-07-10 | Stop reason: HOSPADM

## 2023-07-07 RX ORDER — FENTANYL CITRATE 50 UG/ML
50 INJECTION, SOLUTION INTRAMUSCULAR; INTRAVENOUS EVERY 30 MIN PRN
Status: DISCONTINUED | OUTPATIENT
Start: 2023-07-07 | End: 2023-07-07

## 2023-07-07 RX ORDER — OXYTOCIN/0.9 % SODIUM CHLORIDE 30/500 ML
340 PLASTIC BAG, INJECTION (ML) INTRAVENOUS CONTINUOUS PRN
Status: DISCONTINUED | OUTPATIENT
Start: 2023-07-07 | End: 2023-07-07 | Stop reason: HOSPADM

## 2023-07-07 RX ORDER — MISOPROSTOL 200 UG/1
800 TABLET ORAL
Status: DISCONTINUED | OUTPATIENT
Start: 2023-07-07 | End: 2023-07-07

## 2023-07-07 RX ADMIN — ESCITALOPRAM OXALATE 10 MG: 10 TABLET ORAL at 17:18

## 2023-07-07 RX ADMIN — PHENYLEPHRINE HYDROCHLORIDE 100 MCG: 10 INJECTION INTRAVENOUS at 07:50

## 2023-07-07 RX ADMIN — ENOXAPARIN SODIUM 40 MG: 40 INJECTION SUBCUTANEOUS at 20:14

## 2023-07-07 RX ADMIN — SODIUM CHLORIDE, POTASSIUM CHLORIDE, SODIUM LACTATE AND CALCIUM CHLORIDE: 600; 310; 30; 20 INJECTION, SOLUTION INTRAVENOUS at 06:08

## 2023-07-07 RX ADMIN — ACETAMINOPHEN 975 MG: 325 TABLET, FILM COATED ORAL at 18:31

## 2023-07-07 RX ADMIN — SODIUM CHLORIDE, POTASSIUM CHLORIDE, SODIUM LACTATE AND CALCIUM CHLORIDE: 600; 310; 30; 20 INJECTION, SOLUTION INTRAVENOUS at 08:33

## 2023-07-07 RX ADMIN — DEXAMETHASONE SODIUM PHOSPHATE 4 MG: 4 INJECTION, SOLUTION INTRA-ARTICULAR; INTRALESIONAL; INTRAMUSCULAR; INTRAVENOUS; SOFT TISSUE at 08:08

## 2023-07-07 RX ADMIN — KETOROLAC TROMETHAMINE 15 MG: 30 INJECTION, SOLUTION INTRAMUSCULAR at 08:23

## 2023-07-07 RX ADMIN — Medication 100 ML: at 08:00

## 2023-07-07 RX ADMIN — Medication 5 MG: at 07:48

## 2023-07-07 RX ADMIN — BUPIVACAINE HYDROCHLORIDE IN DEXTROSE 1.4 ML: 7.5 INJECTION, SOLUTION SUBARACHNOID at 07:42

## 2023-07-07 RX ADMIN — TRANEXAMIC ACID 1 G: 10 INJECTION, SOLUTION INTRAVENOUS at 07:54

## 2023-07-07 RX ADMIN — SENNOSIDES AND DOCUSATE SODIUM 1 TABLET: 50; 8.6 TABLET ORAL at 12:41

## 2023-07-07 RX ADMIN — BUPIVACAINE HYDROCHLORIDE 30 ML: 5 INJECTION, SOLUTION EPIDURAL; INTRACAUDAL at 08:32

## 2023-07-07 RX ADMIN — SODIUM CITRATE AND CITRIC ACID MONOHYDRATE 30 ML: 500; 334 SOLUTION ORAL at 07:16

## 2023-07-07 RX ADMIN — PHENYLEPHRINE HYDROCHLORIDE 100 MCG: 10 INJECTION INTRAVENOUS at 08:09

## 2023-07-07 RX ADMIN — ONDANSETRON 4 MG: 2 INJECTION INTRAMUSCULAR; INTRAVENOUS at 07:36

## 2023-07-07 RX ADMIN — BUPIVACAINE 20 ML: 13.3 INJECTION, SUSPENSION, LIPOSOMAL INFILTRATION at 08:32

## 2023-07-07 RX ADMIN — ACETAMINOPHEN 975 MG: 325 TABLET, FILM COATED ORAL at 12:38

## 2023-07-07 RX ADMIN — ONDANSETRON 4 MG: 2 INJECTION INTRAMUSCULAR; INTRAVENOUS at 08:08

## 2023-07-07 RX ADMIN — SODIUM CHLORIDE, SODIUM LACTATE, POTASSIUM CHLORIDE, CALCIUM CHLORIDE AND DEXTROSE MONOHYDRATE: 5; 600; 310; 30; 20 INJECTION, SOLUTION INTRAVENOUS at 10:31

## 2023-07-07 RX ADMIN — SENNOSIDES AND DOCUSATE SODIUM 1 TABLET: 50; 8.6 TABLET ORAL at 20:13

## 2023-07-07 RX ADMIN — Medication 2 G: at 07:17

## 2023-07-07 RX ADMIN — KETOROLAC TROMETHAMINE 30 MG: 30 INJECTION, SOLUTION INTRAMUSCULAR; INTRAVENOUS at 14:30

## 2023-07-07 RX ADMIN — KETOROLAC TROMETHAMINE 30 MG: 30 INJECTION, SOLUTION INTRAMUSCULAR; INTRAVENOUS at 20:29

## 2023-07-07 RX ADMIN — ACETAMINOPHEN 975 MG: 325 TABLET ORAL at 06:21

## 2023-07-07 RX ADMIN — PHENYLEPHRINE HYDROCHLORIDE 100 MCG: 10 INJECTION INTRAVENOUS at 07:48

## 2023-07-07 RX ADMIN — PHENYLEPHRINE HYDROCHLORIDE 100 MCG: 10 INJECTION INTRAVENOUS at 08:15

## 2023-07-07 RX ADMIN — METHYLERGONOVINE MALEATE 200 MCG: 0.2 INJECTION INTRAVENOUS at 08:02

## 2023-07-07 RX ADMIN — Medication 5 MG: at 07:54

## 2023-07-07 RX ADMIN — Medication 200 ML: at 08:10

## 2023-07-07 RX ADMIN — Medication 5 MCG: at 07:42

## 2023-07-07 ASSESSMENT — ACTIVITIES OF DAILY LIVING (ADL)
ADLS_ACUITY_SCORE: 20

## 2023-07-07 NOTE — H&P
OB Labor and Delivery History and Physical    Name: Trinh Gonzales  Age: 22 year old  YOB: 2000   Medical Record #: [id number]     Date of Admission:  2023  Admitting Service:  Obstetrics and Gynecology  Primary Provider:   Brandi Nolan    DATE: 2023         Identification:   Trinh Gonzales is a 22 year old  female at 38w6d with Estimated Date of Delivery: Jul 15, 2023         Chief Complaint:   She is being admitted for .         History of Present Illness:   The OB prenatal record was reviewed with the patient. Briefly, this patient's pregnancy was complicated by Desire elective primary  section, Fetal growth restriction, Depression and anxiety, Polycystic ovarian syndrome, Kidney stones first trimester (S/P Left ureteroscopy with laser lithotripsy and stent placement 2023), Elevated glucose screen with normal 3 hour GTT, Anemia in pregnancy.    She is admitted today because of . She desires an elective primary  section.  She no longer desires induction of labor.  She feels like the fetal head is too large and she will not be able to have a successful vaginal delivery.  She is very anxious about a vaginal delivery and does not feel that her baby will fit through her pelvis as she has such a small stature.  She feels good fetal movement.  She has rare contractions.  She denies pelvic pressure.  She denies leaking of fluid.  She denies vaginal bleeding.    She denies abnormal vaginal discharge, itching or irritation. No difficulty urinating, dysuria, hematuria, or flank pain. Denies nausea or vomiting. No fever or chills. She denies headache, vision changes, lower extremity swelling, upper abdominal pain, chest pain, or shortness of breath.  She denies depression symptoms on Lexapro and Hydroxyzine. She does have anxiety as mentioned above.  She uses Metformin for polycystic ovarian syndrome.    Fetal Movement: Present and  "active.  Leakage of Fluid: Absent.  Vaginal Bleeding: Absent.  Contractions: Absent.  Preeclampsia Signs / Symptoms: Absent.         Review of Systems:   As per the HPI. Other systems are reviewed and negative.         Allergies:     Allergies   Allergen Reactions     Apple Juice      Gums/mouth gets itchy  Regular apples  Ok to have apple juice-just allergic to \"raw apples\" per patient     Lazar      Gums/mouth gets itchy     Pistachios [Nuts]             Medication Prior to Admission:     Medications Prior to Admission   Medication Sig Dispense Refill Last Dose     acetaminophen (TYLENOL) 500 MG tablet Take 500-1,000 mg by mouth every 6 hours as needed   More than a month     albuterol (PROAIR HFA/PROVENTIL HFA/VENTOLIN HFA) 108 (90 Base) MCG/ACT inhaler Inhale 2 puffs into the lungs every 4 hours as needed   More than a month     escitalopram (LEXAPRO) 10 MG tablet Take 1 tablet (10 mg) by mouth daily 90 tablet 3 7/6/2023 at 1200     metFORMIN (GLUCOPHAGE) 500 MG tablet TAKE 1 TABLET(500 MG) BY MOUTH TWICE DAILY WITH MEALS (Patient taking differently: Take 1,000 mg by mouth daily (with dinner)) 60 tablet 3 More than a month     ondansetron (ZOFRAN ODT) 4 MG ODT tab Take 1 tablet (4 mg) by mouth every 8 hours as needed for nausea or vomiting 50 tablet 3 More than a month     phenazopyridine (PYRIDIUM) 200 MG tablet Take 1 tablet (200 mg) by mouth 3 times daily as needed for irritation 6 tablet 0 More than a month     Prenatal MV & Min w/FA-DHA (PRENATAL GUMMIES) 0.18-25 MG CHEW    7/6/2023 at 1200            Active Problem List:     Patient Active Problem List   Diagnosis     Adjustment disorder with depressed mood     PTSD (post-traumatic stress disorder)     Suicidal behavior     Adjustment disorder with mixed disturbance of emotions and conduct     Anxiety     Astigmatism     PCOS (polycystic ovarian syndrome)     Pregnancy, supervision of first, third trimester     Nausea and vomiting during pregnancy     " Poor fetal growth affecting management of mother in second trimester          Past Medical History:     Past Medical History:   Diagnosis Date     Adjustment disorder with depressed mood 2018     Adjustment disorder with mixed disturbance of emotions and conduct 2016     Anxiety 2019     Astigmatism 2010    Formatting of this note might be different from the original. IMO Update     Irregular menses 2022     Nephrolithiasis 2023    S/P Left ureteroscopy with laser lithotripsy and stent placement     PCOS (polycystic ovarian syndrome) 2022     Primary oligomenorrhea 2022     PTSD (post-traumatic stress disorder) 2018     Suicidal behavior 2016     Thelarche, premature 2008    Overview:  Asymmetric.  Slight nipple enlargement on left side.  Right side has breast tissue which is probably 3-4 cm. IMO Update 10/11  Formatting of this note might be different from the original. Asymmetric.  Slight nipple enlargement on left side.  Right side has breast tissue which is probably 3-4 cm. IMO Update 10/11          Past Surgical History:     Past Surgical History:   Procedure Laterality Date     COMBINED CYSTOSCOPY, URETEROSCOPY, LASER HOLMIUM LITHOTRIPSY URETER(S) Left 2023    Procedure: Left ureteroscopy with laser lithotripsy and stent placement;  Surgeon: James Hanley MD;  Location:  OR     COMBINED CYSTOSCOPY, URETEROSCOPY, LASER HOLMIUM LITHOTRIPSY URETER(S) Left 3/9/2023    Procedure: Left ureteroscopy with laser lithotripsy and stent placement;  Surgeon: James Hanley MD;  Location:  OR     WISDOM TOOTH EXTRACTION Bilateral             Obstetric History:   EDC: Estimated Date of Delivery: Jul 15, 2023  OB History    Para Term  AB Living   1 0 0 0 0 0   SAB IAB Ectopic Multiple Live Births   0 0 0 0 0      # Outcome Date GA Lbr Gavin/2nd Weight Sex Delivery Anes PTL Lv   1 Current                    Family History:   Unchanged  from prenatal record.         Social History:     Social History     Tobacco Use     Smoking status: Never     Smokeless tobacco: Never   Substance Use Topics     Alcohol use: No     History   Sexual Activity     Sexual activity: Yes     Partners: Male     Birth control/ protection: None          Physical Exam:   Vital signs:  BP 97/65   Pulse 76   Temp 98.2  F (36.8  C) (Oral)   Resp 16   SpO2 99%   General: Alert and oriented x 3. Well developed and well nourished gravid female.  Cardiovascular: Regular rate and rhythm. No significant murmurs, rubs, or gallops detected. Peripheral pulses normal bilaterally in upper and lower extremities.  Lungs: Clear to auscultation bilaterally, no adventitious sounds, good air movement throughout.  Abdomen gravid, soft, nontender.  Cervix:   Membranes: intact   Dilation: 0.5 cm   Effacement: 80%   Station:-2   Consistency: average   Position: Mid  Presentation:Cephalic  Extremities: no clubbing, cyanosis, or edema.  Estimated fetal weight: 2900 lbs.    Fetal Heart Rate Tracing: reactive and reassuring  Tocometer: irregular contrractions        Diagnostics:   PRENATAL LABS:  Type and Rh: O Positive  ABS: Negative  Rubella: Positive  Treponema: Negative  HBsAg: Negative  Hepatitis C: Negative  HIV: Negative  TSH: 1.17  Chlamydia: Negative  Gonorrhea: Negative  Ucx: negative  Glucose screen: 175  3 hour GTT 81, 161, 127, 125  GBS: Negative    01/12/2023 Pap NILM  07/07/2023 Treponema: pending  07/07/2023 Type and screen: pending    CBC Results (Last 2)  Recent Labs   Lab Test 07/07/23  0603 04/20/23  1221   WBC 7.7 7.4   RBC 3.19* 3.20*   HGB 9.1* 10.1*   HCT 28.7* 30.6*   MCV 90 96    221     CMP Results  Recent Labs   Lab Test 07/07/23  0603   *   POTASSIUM 3.5   CHLORIDE 103   CO2 21*   ANIONGAP 11   BUN 6.2   CR 0.54   GLC 86   GFRESTIMATED >90   CLARITZA 8.7   PROTTOTAL 5.8*   ALBUMIN 3.3*   BILITOTAL 0.6   ALKPHOS 155*   AST 20   ALT 7        RADIOLOGY:  2023 OB ultrasound: 20 4/7 wk, 288 gm, 4%, cephalic presentation, anterior placenta, no placenta previa, normal BARBARA, normal fetal anatomy.  03/10/2023 OB level 2 ultrasound: 21 6/7 wk, 382 gm, 8%, breech presentation, anterior and fundal placenta, no placenta previa, normal BARBARA, fetal growth restriction, normal fetal anatomy.  2023 OB level 2 ultrasound: 24 6/7 wk, 598 gm, 5%, cephalic presentation, anterior and fundal placenta, no placenta previa, normal BARBARA, fetal growth restriction, normal fetal anatomy, umbilical artery doppler S/D ratio 1.36.  2023 OB ultrasound: 27 3/7 wk, 927 gm, 8.8%, cephalic presentation, anterior placenta, normal BARBARA, appropriate interval fetal growth, umbilical doppler S/D ratio 2.4.  05/15/2023 OB ultrasound: 30 6/7 wk, 1526 gm, 11%, cephalic presentation, anterior placenta, normal BARBARA, normal umbilical artery S/D ratio 3.2.   2023 OB ultrasound: 35 2/7 wk, 2440 gm, 26%, cephalic presentation, anterior placenta, normal BARBARA, normal umbilical artery S/D ratio 2.3- 3.1.   2023 Limited OB ultrasound: cephalic presentation, anterior placenta, heart rate 137 bpm, normal BARBARA, Biophysical profile (BPP) score 8/8, normal umbilical artery S/D ratio 2.2         Assessment:   1. 22 year old  female at 38w6d  2. Desire elective primary  section  3. Fetal growth restriction  4. Depression and anxiety  5. Anemia in pregnancy  6. Polycystic ovarian syndrome  7. Kidney stones first trimester (S/P Left ureteroscopy with laser lithotripsy and stent placement 2023)  8. Elevated glucose screen with normal 3 hour GTT        Plan:   1. Admit.  2. Routine intrapartum care and monitoring per protocol.  3. [facility] recommends delivery at 38-39 weeks due to growth restriction. She declines induction of labor and desires elective primary  section. She is very anxious about a vaginal delivery and does not feel that her baby will fit through her  pelvis as she has such a small stature.  4. I discussed the risk, benefits, alternatives, indications, and expected outcomes of  section with the patient.  I reviewed the risk of bleeding, need for blood transfusion, risk of infection, injury to organs with need for repair, injury to uterus, need for hysterectomy, injury to fetus, anesthesia risk, deep vein thrombosis risk, unexpected findings, need for future  section deliveries, increased risk of placenta accreta and placenta previa in future pregnancies, and rarely death. I discussed the rare possibility of a  hysterectomy. She is agreeable to a blood transfusion if medically indicated and she is aware of the risks of a blood transfusion including HIV, Hepatitis B and Hepatitis C. I discussed the use of bilateral sequential compression devices. I discussed the routine recovery process, post-operative pain medication, use of a yin catheter and the anticipated hospital stay.  5. She verbalized understanding and desired to proceed.  6. The patient asked appropriate questions and these were answered for her.  7. The consent form was reviewed and signed.  8. I discussed use of Lexapro, Hydroxyzine, and Metformin in pregnancy and postpartum.    The risks, benefits, and alternatives have been fully discussed with the patient. She desires to proceed.    Tai Hoang MD  Obstetrics and Gynecology

## 2023-07-07 NOTE — PLAN OF CARE
Assumed care of patient from PACU nurse, vitals and fundal checks per protocol, denies pain. Mom is bonding well with baby. Will continue to monitor and provide cares as needed.

## 2023-07-07 NOTE — PLAN OF CARE
Face to face report given with opportunity to observe patient.    Report given to Nancy Galaviz RN   7/7/2023  7:04 AM

## 2023-07-07 NOTE — PLAN OF CARE
Scheduled AM C/S Admit Note  Trinh Gonzales  MRN: [id number]  Gestational Age: 38w6d      Trinh Gonzales presents for scheduled  section for pt preference.  Patient denies contractions, bleeding or LOF.  NPO [id number].    Past Medical History:   Diagnosis Date     Adjustment disorder with depressed mood 2018     Adjustment disorder with mixed disturbance of emotions and conduct 2016     Anxiety 2019     Astigmatism 2010    Formatting of this note might be different from the original. IMO Update     Irregular menses 2022     Nephrolithiasis 2023    S/P Left ureteroscopy with laser lithotripsy and stent placement     PCOS (polycystic ovarian syndrome) 2022     Primary oligomenorrhea 2022     PTSD (post-traumatic stress disorder) 2018     Suicidal behavior 2016     Thelarche, premature 2008    Overview:  Asymmetric.  Slight nipple enlargement on left side.  Right side has breast tissue which is probably 3-4 cm. IMO Update 10/11  Formatting of this note might be different from the original. Asymmetric.  Slight nipple enlargement on left side.  Right side has breast tissue which is probably 3-4 cm. IMO Update 10/11     Past Surgical History:   Procedure Laterality Date     COMBINED CYSTOSCOPY, URETEROSCOPY, LASER HOLMIUM LITHOTRIPSY URETER(S) Left 2023    Procedure: Left ureteroscopy with laser lithotripsy and stent placement;  Surgeon: James Hanley MD;  Location:  OR     COMBINED CYSTOSCOPY, URETEROSCOPY, LASER HOLMIUM LITHOTRIPSY URETER(S) Left 3/9/2023    Procedure: Left ureteroscopy with laser lithotripsy and stent placement;  Surgeon: James Hanley MD;  Location:  OR     WISDOM TOOTH EXTRACTION Bilateral            FHT: 140      Plan:  - section at 0730.    Patient ambulated to bed via self.. Patient is alert and oriented X 3, denies any pain. pain. Patient oriented to room, unit, hourly rounding, and  plan of care. Call light within reach.  Explained admission packet with patient bill of rights brochure. Will continue to monitor and document as needed.     Inpatient nursing criteria listed below was met:    Health care directives status obtained and documented: Yes  Patient identifies a surrogate decision maker: Yes   If yes, who:Chuck Contact Information:[phone]  Core Measure diagnosis present:: No  Vaccine assessment done and vaccines ordered if appropriate. Yes  Clergy visit ordered if patient requests: N/A  Skin issues/needs documented:N/A  Isolation needs addressed, if appropriate: N/A  Fall Prevention (Med and High risk): Care plan updated, Education given and documented and signage used: Yes  Care Plan initiated: Yes  Education Documented (Reminder to educate patient if MRSA is present on admission): Yes  Education Assessment documented:Yes  Patient has discharge needs (If yes, please explain): No

## 2023-07-07 NOTE — PLAN OF CARE
Assessments as charted. B/P: 100/56, T: 98, P: 66, R: 16. Rates pain: 4/10 lower uterine intermittent cramping. Incision: Healing well, well approximated, and without signs of infection. Needs to void after yin catheter removal. Fundus Firm @U-2. Lochia: Light. Activity: normal activity. Infant feeding: Breast feeding going well with mild difficulty. Pt needs help getting baby latched.           Postpartum breastfeeding assessment completed and education provided, see Patient Education Activity.  Items included in the education are:     proper positioning and latch    effectiveness of feeding    manual expression    handling and storing breastmilk    maintenance of breastfeeding for the first 6 months    sign/symptoms of infant feeding issues requiring referral to qualified health care provider  Postpartum care education provided, see Patient Education activity. Patient denies needs. Will monitor.  Nancy Hobson RN

## 2023-07-07 NOTE — OR NURSING
Patient feels oxygen can come off now.    Doesn't feel like she needs for her anxiety.  Handoff to PAWAN Cruz.   Patient stable criteria met for transfer to [facility] staff.   Reports intermittent dizziness with movement but resolves at rest.    Does have hx of motion sickness and states she does get dizzy easily but reports dizziness with movement has improved since arrival to PACU phase of care.

## 2023-07-07 NOTE — ANESTHESIA PROCEDURE NOTES
TAP Procedure Note    Pre-Procedure   Staff -        CRNA: Josemanuel Tobin APRN CRNA       Performed By: CRNA       Location: OR       Procedure Start/Stop Times: 2023 8:26 AM and 2023 8:32 AM       Pre-Anesthestic Checklist: patient identified, IV checked, site marked, risks and benefits discussed, informed consent, monitors and equipment checked, pre-op evaluation, at physician/surgeon's request and post-op pain management  Timeout:       Correct Patient: Yes        Correct Procedure: Yes        Correct Site: Yes        Correct Position: Yes        Correct Laterality: Yes        Site Marked: Yes  Procedure Documentation  Procedure: TAP       Diagnosis:  SECTION       Laterality: bilateral       Patient Position: supine       Skin prep: Chloraprep       Needle Type: short bevel       Needle Gauge: 20.        Needle Length (Inches): 4        Ultrasound guided       1. Ultrasound was used to identify targeted nerve, plexus, vascular marker, or fascial plane and place a needle adjacent to it in real-time.       2. Ultrasound was used to visualize the spread of anesthetic in close proximity to the above referenced structure.       3. A permanent image is entered into the patient's record.       4. The visualized anatomic structures appeared normal.       5. There were no apparent abnormal pathologic findings.    Assessment/Narrative         The placement was negative for: blood aspirated, painful injection and site bleeding       Paresthesias: No.       Bolus given via needle..        Secured via.        Insertion/Infusion Method: Single Shot       Complications: none       Injection made incrementally with aspirations every 5 mL.    Medication(s) Administered   Bupivacaine 0.5% PF (Infiltration) - Infiltration, Abdominal Tissue   30 mL - 2023 8:32:00 AM  Bupivacaine liposome (Exparel) 1.3% LA inj susp (Infiltration) - Infiltration, Abdominal Tissue   20 mL - 2023 8:32:00 AM  Medication  "Administration Time: 7/7/2023 8:26 AM     Comments:  Assisted by PAWAN Abreu. No complications. Volume split equally between left and right TAP.      FOR [facility] ONLY:   Pain Team Contact information: please page the Pain Team Via [employer]. Search \"Pain\". During daytime hours, please page the attending first. At night please page the resident first.      "

## 2023-07-07 NOTE — ANESTHESIA CARE TRANSFER NOTE
Patient: Trinh Gonzales    Procedure: Procedure(s):   SECTION girl at 0758       Diagnosis: IUGR (intrauterine growth restriction) affecting care of mother [O36.5990]  Diagnosis Additional Information: No value filed.    Anesthesia Type:   Spinal     Note:    Oropharynx: oropharynx clear of all foreign objects  Level of Consciousness: awake  Oxygen Supplementation: room air    Independent Airway: airway patency satisfactory and stable  Dentition: dentition unchanged  Vital Signs Stable: post-procedure vital signs reviewed and stable  Report to RN Given: handoff report given  Patient transferred to: PACU    Handoff Report: Identifed the Patient, Identified the Reponsible Provider, Reviewed the pertinent medical history, Discussed the surgical course, Reviewed Intra-OP anesthesia mangement and issues during anesthesia, Set expectations for post-procedure period and Allowed opportunity for questions and acknowledgement of understanding      Vitals:  Vitals Value Taken Time   BP     Temp     Pulse     Resp     SpO2         Electronically Signed By: RONEL Davidson CRNA  2023  8:38 AM

## 2023-07-07 NOTE — ANESTHESIA CARE TRANSFER NOTE
Patient: Trinh Gonzales    Procedure: Procedure(s):   SECTION girl at 0758       Diagnosis: IUGR (intrauterine growth restriction) affecting care of mother [O36.5990]  Diagnosis Additional Information: No value filed.    Anesthesia Type:   Spinal     Note:    Oropharynx: oropharynx clear of all foreign objects and spontaneously breathing  Level of Consciousness: awake  Oxygen Supplementation: room air  Level of Supplemental Oxygen (L/min / FiO2): 2  Independent Airway: airway patency satisfactory and stable  Dentition: dentition unchanged  Vital Signs Stable: post-procedure vital signs reviewed and stable  Report to RN Given: handoff report given  Patient transferred to: Labor and Delivery  Comments: Pt receiving O2 at her request for anxiety.  Handoff Report: Identifed the Patient, Identified the Reponsible Provider, Reviewed the pertinent medical history, Discussed the surgical course, Reviewed Intra-OP anesthesia mangement and issues during anesthesia, Set expectations for post-procedure period and Allowed opportunity for questions and acknowledgement of understanding      Vitals:  Vitals Value Taken Time   BP     Temp     Pulse     Resp     SpO2         Electronically Signed By: RONEL Davidson CRNA  2023  8:56 AM

## 2023-07-07 NOTE — OP NOTE
Name: Trinh Gonzales  Age: 22 year old  YOB: 2000   Medical Record #: [id number]     [facility] Operative Note    Date: 2023   Pre-operative Diagnosis: 1. 22 year old  female at 38w6d.    2. Desire elective primary  section  3. Fetal growth restriction  4. Depression and anxiety  5. Anemia in pregnancy  6. Polycystic ovarian syndrome   Post-operative Diagnosis: 1. Same.    2. Delivered a viable female infant.   Procedure: Primary low transverse  section   Surgeon: Tai Hoang MD   Assistant(s): None   Anesthesia: Spinal anesthesia   Estimated Blood Loss: 564 ml QBL   Total IV Fluids: (See anesthesia record)   Blood Transfusion: No transfusion was given during surgery   Total Urine Output: (See anesthesia record)   Drains: Astudillo catheter   Specimens: None   Findings: Viable  female infant in occiput anterior position. Apgars were 8 / 9. Weight was 3050 grams. Amniotic fluid was clear. There was good cry on the abdomen at cord clamp. Placenta delivered intact with a three-vessel cord. Normal appearing uterus, tubes, and ovaries bilaterally.   Complications: None   Disposition: Patient in stable condition, transferred to post-anesthesia recovery. Infant in stable condition to the nursery.   Times: Surgery Start: 0755    Delivery of Infant: 0758    Delivery of Placenta: 0800    Surgery Stop: 08       Procedure in Detail:  I reviewed the risks, benefits, alternatives, indications, and expected outcomes of  section with the patient. She verbalized understanding and desired to proceed. Consent form was signed. The site of surgery was properly noted and marked.     The patient was taken to the operating room and placed on the operating room table in supine position. A hard stop timeout was accomplished. Trinh Gonzales was verified and all members agreed on the procedure and the patient consent. After uneventful anesthesia placement,  bilateral SCDs were placed on the lower extremities. Astudillo catheter was placed. Fetal heart tones were obtained and were found to be 154 beats per minute. The patient was prepped and draped in a left lateral tilt position in the usual sterile fashion.     A Pfannenstiel low transverse skin incision was made 2 cm above the symphysis pubis and carried down sharply through the subcutaneous tissue to the rectus fascia. The fascia was incised in the midline and this incision was extended laterally with the Montalvo scissors. The superior aspect of the fascial incision was grasped with Kocher clamps, elevated and the rectus muscles were dissected off. The inferior aspect of the fascial incision was similarly grasped, elevated and the rectus muscles were dissected off. The rectus muscles were  in the midline. The peritoneum was identified, elevated, and entered sharply with Metzenbaum scissors. This incision was extended superiorly and inferiorly with good visualization of the bladder. A ring retractor was placed into the incision. The vesicouterine peritoneum was identified, elevated and entered sharply with Metzenbaum scissors. This incision was extended. The bladder flap was created digitally.    A low segment, transverse uterine incision was made with the scalpel and carried down sharply to the uterine cavity. This incision was extended laterally with the fingers. There was clear fluid noted upon entry into the uterine cavity. The infant's head was delivered in an occiput anterior position in a flexed fashion. The nose and mouth were suctioned. This was followed by delivery of the anterior shoulder, posterior shoulder, body of the infant and remainder of the infant. There was good cry on the abdomen at cord clamp. The infant was handed to the waiting nursery attendant and Dr. Garcia.     Cord blood was obtained for evaluation. The placenta was delivered intact with a three-vessel cord. The uterus was cleared of  all clots and debris. The cervix was opened with ring forceps. Bimanual massage was performed. The patient received tranexamic acid. Pitocin was given in the IV to firm the uterus. The patient was given Methergine x 1 to firm the uterus. The uterine incision was repaired with 1-0 chromic suture in a running, locked fashion. A second layer of the same suture was used to obtain excellent hemostasis. The incision was inspected and found to be hemostatic. The fundus was palpated and found to be firm. The area behind the uterus and the left and right paracolic gutters were copiously irrigated with warm normal saline and cleared of all clots and debris. Irrigation was carried out until clear. The uterus was returned to the abdomen. The uterine incision was again inspected and found to be hemostatic. The ring retractor was removed. The rectus muscles were inspected and found to be hemostatic. The rectus muscles were re-approximated with 1-0 Vicryl suture. The fascia was reapproximated with loop 1-0 PDS suture in a running fashion. The subcutaneous tissues were copiously irrigated with warm normal saline and cleared of all clots and debris. This was found to be hemostatic. The skin was closed with a subcuticular stitch of 3-0 Monocryl suture and skin glue. Steri strips and bandages were placed over the incision in the usual fashion. The fundus was palpated and found to be firm. A vaginal exam was performed at the end of the procedure to evacuate the lower uterine segment and vagina of blood clots. All sponge, instrument and needle counts were correct prior to the abdominal closure and again at the conclusion of the case. There were no complications and the patient was transferred to the recovery room in stable condition. The infant is in stable condition to the nursery.    Tai Hoang MD  Obstetrics and Gynecology

## 2023-07-07 NOTE — L&D DELIVERY NOTE
Delivery Summary    Trinh Gonzales MRN# [id number]   Age: 22 year old YOB: 2000     ASSESSMENT & PLAN: Please see separate  Section Operation Note for details of delivery.    Tai Hoang MD  Obstetrics and Gynecology       Tammy Gonzales-Trinh [[id number]]    Labor Length    3rd Stage (hrs): 0 (min): 2      Labor Events     labor?: No   steroids: None  Labor Type: Scheduled   Predominate monitoring during 1st stage: continuous electronic fetal monitoring     Rupture identifier: Sac 1  Rupture date/time: 23 0758   Rupture type: Artificial Rupture of Membranes  Fluid color: Clear  Fluid odor: Normal     Augmentation: None     Delivery/Placenta Date and Time    Delivery Date: 23 Delivery Time:  7:58 AM   Placenta Date/Time: 2023  8:00 AM  Oxytocin given at the time of delivery: after delivery of placenta  Delivering clinician: Tai Hoang MD   Other personnel present at delivery:  Provider Role   Lali Garcia MD Troumbly, Jessica L RN          Apgars    Living status: Living   1 Minute 5 Minute 10 Minute 15 Minute 20 Minute   Skin color: 0  1       Heart rate: 2  2       Reflex irritability: 2  2       Muscle tone: 2  2       Respiratory effort: 2  2       Total: 8  9       Apgars assigned by: DR. GARCIA     Cord    Vessels: 3 Vessels    Cord Complications: None               Cord Blood Disposition: Lab    Gases Sent?: No    Delayed cord clamping?: Yes    Cord Clamping Delay (seconds): 31-60 seconds    Stem cell collection?: No       [place] Resuscitation    Methods: None   Care at Delivery: Viable baby girl born via primary  per Dr. Hoang, baby transferred to warmer, dried and stimulated with warm blankets, lusty cry, heart rate always greater than 100, bluish color but pinking up, APGARs 8&9, bands applied to mom, baby, and dad.   Output in Delivery Room: Voided     [place] Measurements    Weight: 6 lb 11.6 oz Length:  "\"   Head circumference: 32.5 cm Chest circumference: 32 cm   Abdominal girth: 33 cm  Output in delivery room: Voided     Skin to Skin and Feeding Plan    Skin to skin initiation date/time: 1841    Skin to skin with: Mother  Skin to skin end date/time: 1841    Breastfeeding initiated date/time: 2023 0905     Labor Events and Shoulder Dystocia    Fetal Tracing Prior to Delivery: Category 1  Shoulder dystocia present?: Neg     Delivery (Maternal) (Provider to Complete) ([id number])       Blood Loss  Mother: Trinh Gonzales #[id number]   Start of Mother's Information    Delivery Blood Loss  23 0755 - 23 1028    Total Surgical QBL Blood Loss (mL) Hospital Encounter 564 mL    Postpartum QBL (mL) Hospital Encounter 103 mL    Total  667 mL         End of Mother's Information  Mother: Trinh Gonzales #[id number]          Delivery - Provider to Complete ([id number])    Delivering clinician: Tai Hoang MD  Delivery Type (Choose the 1 that will go to the Birth History): , Low Transverse                    Priority: Scheduled   Indications for : Other (Comment)   Other personnel:  Provider Role   Lali Garcia MD Troumbly, Jessica L, RN                 Placenta    Date/Time: 2023  8:00 AM  Removal: Manual Removal  Disposition: Hospital disposal           Anesthesia    Method: Spinal                Presentation and Position    Presentation: Vertex     Occiput Anterior                 Tai Hoang MD  "

## 2023-07-07 NOTE — ANESTHESIA PROCEDURE NOTES
"Intrathecal injection Procedure Note    Pre-Procedure   Staff -        CRNA: Josemanuel Tobin APRN CRNA       Performed By: CRNA       Procedure Start/Stop Times: 2023 7:37 AM and 2023 7:42 AM       Pre-Anesthestic Checklist: patient identified, IV checked, risks and benefits discussed, informed consent, monitors and equipment checked, pre-op evaluation, at physician/surgeon's request and post-op pain management  Timeout:       Correct Patient: Yes        Correct Procedure: Yes        Correct Site: Yes        Correct Position: Yes   Procedure Documentation  Procedure: intrathecal injection       Diagnosis:  section       Patient Position: sitting       Skin prep: Chloraprep       Insertion Site: L3-4. (midline approach).       Needle Gauge: 25.        Needle Length (Inches): 3.5        Spinal Needle Type: Piotr tip       Introducer used       Introducer: 20 G       # of attempts: 1 and  # of redirects:     Assessment/Narrative         Paresthesias: No.       CSF fluid: clear.    Medication(s) Administered   0.75% Hyperbaric Bupivacaine (Intrathecal) - Intrathecal, Back   1.4 mL - 2023 7:42:00 AM  Medication Administration Time: 2023 7:37 AM     Comments:  No known complications      FOR [facility] ([facility] ONLY:   Pain Team Contact information: please page the Pain Team Via [employer]. Search \"Pain\". During daytime hours, please page the attending first. At night please page the resident first.      "

## 2023-07-07 NOTE — ANESTHESIA POSTPROCEDURE EVALUATION
Patient: Trinh Gonzales    Procedure: Procedure(s):   SECTION girl at 0758       Anesthesia Type:  Spinal    Note:  Disposition: Inpatient   Postop Pain Control: Uneventful            Sign Out: Well controlled pain   PONV: No   Neuro/Psych: Uneventful            Sign Out: Acceptable/Baseline neuro status   Airway/Respiratory: Uneventful            Sign Out: Acceptable/Baseline resp. status   CV/Hemodynamics: Uneventful            Sign Out: Acceptable CV status; No obvious hypovolemia; No obvious fluid overload   Other NRE: NONE   DID A NON-ROUTINE EVENT OCCUR? No           Last vitals:  Vitals Value Taken Time   BP 94/66 23 0930   Temp 97.4  F (36.3  C) 23 0930   Pulse 81 23 0936   Resp 16 23 0936   SpO2 100 % 23       Electronically Signed By: RONEL Davidson CRNA  2023  10:22 AM

## 2023-07-08 LAB
ERYTHROCYTE [DISTWIDTH] IN BLOOD BY AUTOMATED COUNT: 14.1 % (ref 10–15)
HCT VFR BLD AUTO: 25.3 % (ref 35–47)
HGB BLD-MCNC: 8.1 G/DL (ref 11.7–15.7)
MCH RBC QN AUTO: 28.4 PG (ref 26.5–33)
MCHC RBC AUTO-ENTMCNC: 32 G/DL (ref 31.5–36.5)
MCV RBC AUTO: 89 FL (ref 78–100)
PLATELET # BLD AUTO: 164 10E3/UL (ref 150–450)
RBC # BLD AUTO: 2.85 10E6/UL (ref 3.8–5.2)
WBC # BLD AUTO: 12.7 10E3/UL (ref 4–11)

## 2023-07-08 PROCEDURE — 120N000001 HC R&B MED SURG/OB

## 2023-07-08 PROCEDURE — 85027 COMPLETE CBC AUTOMATED: CPT | Performed by: OBSTETRICS & GYNECOLOGY

## 2023-07-08 PROCEDURE — 250N000013 HC RX MED GY IP 250 OP 250 PS 637: Performed by: OBSTETRICS & GYNECOLOGY

## 2023-07-08 PROCEDURE — 258N000003 HC RX IP 258 OP 636: Performed by: OBSTETRICS & GYNECOLOGY

## 2023-07-08 PROCEDURE — 36415 COLL VENOUS BLD VENIPUNCTURE: CPT | Performed by: OBSTETRICS & GYNECOLOGY

## 2023-07-08 PROCEDURE — 250N000011 HC RX IP 250 OP 636: Mod: JZ | Performed by: OBSTETRICS & GYNECOLOGY

## 2023-07-08 RX ORDER — METHYLPREDNISOLONE SODIUM SUCCINATE 125 MG/2ML
125 INJECTION, POWDER, LYOPHILIZED, FOR SOLUTION INTRAMUSCULAR; INTRAVENOUS
Status: DISCONTINUED | OUTPATIENT
Start: 2023-07-08 | End: 2023-07-10 | Stop reason: HOSPADM

## 2023-07-08 RX ORDER — DIPHENHYDRAMINE HYDROCHLORIDE 50 MG/ML
50 INJECTION INTRAMUSCULAR; INTRAVENOUS
Status: DISCONTINUED | OUTPATIENT
Start: 2023-07-08 | End: 2023-07-10 | Stop reason: HOSPADM

## 2023-07-08 RX ADMIN — ACETAMINOPHEN 975 MG: 325 TABLET, FILM COATED ORAL at 12:34

## 2023-07-08 RX ADMIN — KETOROLAC TROMETHAMINE 30 MG: 30 INJECTION, SOLUTION INTRAMUSCULAR; INTRAVENOUS at 14:26

## 2023-07-08 RX ADMIN — ESCITALOPRAM OXALATE 10 MG: 10 TABLET ORAL at 17:16

## 2023-07-08 RX ADMIN — SIMETHICONE 80 MG: 80 TABLET, CHEWABLE ORAL at 12:34

## 2023-07-08 RX ADMIN — SIMETHICONE 80 MG: 80 TABLET, CHEWABLE ORAL at 08:32

## 2023-07-08 RX ADMIN — ACETAMINOPHEN 975 MG: 325 TABLET, FILM COATED ORAL at 00:31

## 2023-07-08 RX ADMIN — SIMETHICONE 80 MG: 80 TABLET, CHEWABLE ORAL at 17:45

## 2023-07-08 RX ADMIN — KETOROLAC TROMETHAMINE 30 MG: 30 INJECTION, SOLUTION INTRAMUSCULAR; INTRAVENOUS at 08:26

## 2023-07-08 RX ADMIN — SIMETHICONE 80 MG: 80 TABLET, CHEWABLE ORAL at 23:04

## 2023-07-08 RX ADMIN — ACETAMINOPHEN 975 MG: 325 TABLET, FILM COATED ORAL at 06:29

## 2023-07-08 RX ADMIN — IRON SUCROSE 300 MG: 20 INJECTION, SOLUTION INTRAVENOUS at 10:32

## 2023-07-08 RX ADMIN — SENNOSIDES AND DOCUSATE SODIUM 2 TABLET: 50; 8.6 TABLET ORAL at 08:25

## 2023-07-08 RX ADMIN — KETOROLAC TROMETHAMINE 30 MG: 30 INJECTION, SOLUTION INTRAMUSCULAR; INTRAVENOUS at 02:29

## 2023-07-08 RX ADMIN — ACETAMINOPHEN 975 MG: 325 TABLET, FILM COATED ORAL at 18:28

## 2023-07-08 RX ADMIN — KETOROLAC TROMETHAMINE 30 MG: 30 INJECTION, SOLUTION INTRAMUSCULAR; INTRAVENOUS at 20:24

## 2023-07-08 RX ADMIN — ENOXAPARIN SODIUM 40 MG: 40 INJECTION SUBCUTANEOUS at 20:27

## 2023-07-08 ASSESSMENT — ACTIVITIES OF DAILY LIVING (ADL)
ADLS_ACUITY_SCORE: 20

## 2023-07-08 NOTE — PLAN OF CARE
Patient up ad nj in room with baby and spouse, VSS, reports adequate pain control with scheduled pain medication, fundus firm @ U-2, light bleeding, surgical incision is clean, dry, and intact, without signs or symptoms of infection, shower complete, pt has ambulated in the bill, breastfeeding is going well, will continue to monitor and provide cares as needing.

## 2023-07-08 NOTE — PLAN OF CARE
Assessments as charted. B/P: 94/54, T: 98.5, P: 70, R: 16. Rates pain: 2/10 intermittent uterine cramping. Incision: Healing well, well approximated, and without signs of infection. Voiding without difficulty. Fundus Firm @U-2. Lochia: Light. Activity: normal activity. Infant feeding: Breast feeding going well.           Postpartum breastfeeding assessment completed and education provided, see Patient Education Activity.  Items included in the education are:   proper positioning and latch  effectiveness of feeding  manual expression  handling and storing breastmilk  maintenance of breastfeeding for the first 6 months  sign/symptoms of infant feeding issues requiring referral to qualified health care provider  Postpartum care education provided, see Patient Education activity. Patient denies needs. Will monitor.  Nancy Hobson RN

## 2023-07-08 NOTE — PLAN OF CARE
Notified Dr. Hoang of patient refusing her Metformin today and yesterday's dose, pt stated she wanted to discuss taking it with Dr. Hoang, discontinue med per MD order, will discuss with patient tomorrow. Med sent back to pharmacy.

## 2023-07-08 NOTE — PLAN OF CARE
Face to face report given with opportunity to observe patient.    Report given to PAWAN Hernandez RN   7/7/2023  7:19 PM

## 2023-07-08 NOTE — PROGRESS NOTES
Name: Trinh Gonzales  Age: 22 year old  YOB: 2000   Medical Record #: [id number]     Postpartum Day 1:     DATE: 2023  SUBJECTIVE:  Patient is doing well. Her pain is well controlled with current medications. She reports normal lochia. She is tolerating a regular diet without nausea. She is ambulating. She is voiding. She is passing flatus. The patient denies depression symptoms. She denies headache or vision changes, shortness of breath, or chest pain. She denies fever or chills. The baby is well. She is nursing.    OBJECTIVE:  BP 94/54 (BP Location: Right arm, Patient Position: Semi-Ball's, Cuff Size: Adult Large)   Pulse 70   Temp 98.5  F (36.9  C) (Oral)   Resp 16   SpO2 95%   Breastfeeding Unknown     I/O last 3 completed shifts:  In: 3444 [P.O.:1020; I.V.:2424]  Out: 3017 [Urine:2350; Blood:667]    Well developed and well nourished female in no apparent distress. Alert and oriented. Lungs are clear to auscultation bilaterally. Heart is regular rate and rhythm. Abdomen is nondistended with positive bowel sounds. Uterine fundus is firm and palpated below the umbilicus. Incision is clean, dry, and intact without erythema or exudate. Perineum is clean, dry and intact. Extremities no edema, non-tender.    LABS :  PRENATAL LABS:  Type and Rh: O Positive  ABS: Negative  Rubella: Positive  Treponema: Negative  HBsAg: Negative  Hepatitis C: Negative  HIV: Negative  TSH: 1.17  Chlamydia: Negative  Gonorrhea: Negative  Ucx: negative  Glucose screen: 175  3 hour GTT 81, 161, 127, 125  GBS: Negative     2023 Pap NILM  2023 Treponema: pending    CBC Results (Last 3)  Recent Labs   Lab Test 23  0616 23  0603 23  1221   WBC 12.7* 7.7 7.4   RBC 2.85* 3.19* 3.20*   HGB 8.1* 9.1* 10.1*   HCT 25.3* 28.7* 30.6*   MCV 89 90 96    178 221     IMPRESSION :  1. 22 year old  female at 38w6d, now delivered.  2. POD# 1 s/p Primary  section, doing  well.  3. Nursing  4. Depression and anxiety  5. Anemia in pregnancy  6. Polycystic ovarian syndrome    PLAN:  1. Continue routine post-partum and post-operative care.  2. Advance diet as tolerated.  3. The patient has anemia in pregnancy. Her Hgb is 8.1. I discussed options and recommend IV iron infusion. The patient verbalizes understanding and desires to proceed.  4. Change to oral medications. I discussed use of Lexapro, Hydroxyzine, and Metformin in postpartum period.  5. Encourage nursing.  6. Encourage ambulation.  7. Continue pharmacologic venous thromboembolic (VTE) prophylaxis per protocol.    8. Repeat Serology / Treponema Pallidum Antibody Test obtained per [facility] protocol.  9. I discussed some routine post-operative recommendations, precautions and instructions with the patient. I discussed incision and wound care recommendations and bathing recommendations.  10. Anticipate discharge on POD# 3 in stable condition.    Tai Hoang MD  Obstetrics and Gynecology

## 2023-07-08 NOTE — PLAN OF CARE
Assessments as charted. B/P: 98/48, T: 97.6, P: 72, R: 17. Rates pain: 2/10. Reports adequate pain control with tylenol and motrin at this time. Incision: Healing well, well approximated, and without signs of infection. Voiding without difficulty. Fundus firm @U-2. Lochia: Light. Activity: normal activity. Infant feeding: Breast feeding going well.     Postpartum breastfeeding assessment completed and education provided, see Patient Education Activity.  Items included in the education are:   proper positioning and latch  effectiveness of feeding  manual expression  handling and storing breastmilk  maintenance of breastfeeding for the first 6 months  sign/symptoms of infant feeding issues requiring referral to qualified health care provider  Postpartum care education provided, see Patient Education activity. Patient denies needs. Will monitor.  Ligia Kelley RN

## 2023-07-08 NOTE — PLAN OF CARE
Report given with opportunity to observe patient.    Report given to Nancy Kelley RN   7/8/2023  7:18 AM

## 2023-07-09 PROCEDURE — 250N000011 HC RX IP 250 OP 636: Mod: JZ | Performed by: OBSTETRICS & GYNECOLOGY

## 2023-07-09 PROCEDURE — 120N000001 HC R&B MED SURG/OB

## 2023-07-09 PROCEDURE — 250N000013 HC RX MED GY IP 250 OP 250 PS 637: Performed by: OBSTETRICS & GYNECOLOGY

## 2023-07-09 RX ORDER — IBUPROFEN 800 MG/1
800 TABLET, FILM COATED ORAL EVERY 6 HOURS
Status: DISCONTINUED | OUTPATIENT
Start: 2023-07-09 | End: 2023-07-10 | Stop reason: HOSPADM

## 2023-07-09 RX ORDER — FERROUS GLUCONATE 324(38)MG
324 TABLET ORAL
Status: DISCONTINUED | OUTPATIENT
Start: 2023-07-09 | End: 2023-07-10 | Stop reason: HOSPADM

## 2023-07-09 RX ADMIN — ENOXAPARIN SODIUM 40 MG: 40 INJECTION SUBCUTANEOUS at 20:26

## 2023-07-09 RX ADMIN — KETOROLAC TROMETHAMINE 30 MG: 30 INJECTION, SOLUTION INTRAMUSCULAR; INTRAVENOUS at 02:31

## 2023-07-09 RX ADMIN — FERROUS GLUCONATE 324 MG: 324 TABLET ORAL at 08:29

## 2023-07-09 RX ADMIN — SIMETHICONE 80 MG: 80 TABLET, CHEWABLE ORAL at 16:20

## 2023-07-09 RX ADMIN — IBUPROFEN 800 MG: 800 TABLET, FILM COATED ORAL at 20:25

## 2023-07-09 RX ADMIN — SIMETHICONE 80 MG: 80 TABLET, CHEWABLE ORAL at 06:52

## 2023-07-09 RX ADMIN — ESCITALOPRAM OXALATE 10 MG: 10 TABLET ORAL at 17:19

## 2023-07-09 RX ADMIN — KETOROLAC TROMETHAMINE 30 MG: 30 INJECTION, SOLUTION INTRAMUSCULAR; INTRAVENOUS at 08:29

## 2023-07-09 RX ADMIN — ACETAMINOPHEN 975 MG: 325 TABLET, FILM COATED ORAL at 00:32

## 2023-07-09 RX ADMIN — SIMETHICONE 80 MG: 80 TABLET, CHEWABLE ORAL at 12:02

## 2023-07-09 RX ADMIN — IBUPROFEN 800 MG: 800 TABLET, FILM COATED ORAL at 14:30

## 2023-07-09 RX ADMIN — SIMETHICONE 80 MG: 80 TABLET, CHEWABLE ORAL at 23:24

## 2023-07-09 RX ADMIN — ACETAMINOPHEN 975 MG: 325 TABLET, FILM COATED ORAL at 19:16

## 2023-07-09 RX ADMIN — ACETAMINOPHEN 975 MG: 325 TABLET, FILM COATED ORAL at 13:10

## 2023-07-09 RX ADMIN — ACETAMINOPHEN 975 MG: 325 TABLET, FILM COATED ORAL at 06:52

## 2023-07-09 ASSESSMENT — ACTIVITIES OF DAILY LIVING (ADL)
ADLS_ACUITY_SCORE: 20

## 2023-07-09 NOTE — PROGRESS NOTES
Name: Trinh Gonzales  Age: 22 year old  YOB: 2000   Medical Record #: [id number]     Postpartum Day 2:     DATE: 2023  SUBJECTIVE:  Patient is doing well. Her pain is well controlled with current medications. She reports normal lochia. She is tolerating a regular diet without nausea. She is ambulating and voiding spontaneously. She is passing flatus. The patient denies depression or anxiety symptoms, headache, vision changes, fever or chills, shortness of breath, or chest pain. The baby is well. She is nursing.    OBJECTIVE:  BP 96/54 (BP Location: Right arm, Patient Position: Semi-Ball's, Cuff Size: Adult Regular)   Pulse 78   Temp 97.7  F (36.5  C) (Oral)   Resp 16   SpO2 99%   Breastfeeding Unknown     No intake/output data recorded.    Well developed and well nourished female in no apparent distress. Alert and oriented. Lungs are clear to auscultation bilaterally. Heart is regular rate and rhythm. Abdomen is nondistended with positive bowel sounds. Uterine fundus is firm and palpated below the umbilicus. Incision is clean, dry, and intact without erythema or exudate. Perineum is clean, dry and intact. Extremities no edema, non-tender.    LABS :  PRENATAL LABS:  Type and Rh: O Positive  ABS: Negative  Rubella: Positive  Treponema: Negative  HBsAg: Negative  Hepatitis C: Negative  HIV: Negative  TSH: 1.17  Chlamydia: Negative  Gonorrhea: Negative  Ucx: negative  Glucose screen: 175  3 hour GTT 81, 161, 127, 125  GBS: Negative    2023 Pap NILM    CBC Results (Last 2)  Recent Labs   Lab Test 23  0616 23  0603   WBC 12.7* 7.7   RBC 2.85* 3.19*   HGB 8.1* 9.1*   HCT 25.3* 28.7*   MCV 89 90    178       IMPRESSION :  1. 22 year old  female at 38w6d, now delivered.  2. POD# 2 s/p Primary  section, doing well.  3. Nursing  4. Depression and anxiety  5. Anemia in pregnancy  6. Polycystic ovarian syndrome    PLAN:  1. Continue routine  post-partum and post-operative care.  2. Encourage nursing.  3. Encourage ambulation.  4. Continue pharmacologic venous thromboembolic (VTE) prophylaxis per protocol.   5. The patient has anemia in pregnancy. She received IV iron infusion. I recommend continuing with oral iron supplementation.   6. I discussed use of Lexapro, Hydroxyzine, and Metformin in postpartum period.  7. Reviewed some of the home instructions in anticipation of discharge; these include being aware of warning signs of postpartum depression, symptoms of infection, avoiding constipation, iron replacement, and the need to seek medical evaluation for any questions or concerns.  8. I discussed incision and wound care recommendations and bathing recommendations.  9. Options for birth control have been reviewed and a final decision and prescription for that will occur in the postpartum clinic visit.  10. Repeat Serology / Treponema Pallidum Antibody Test obtained per [facility] protocol.  11. Anticipate discharge on POD # 3 in stable condition.    Tai Hoang MD  Obstetrics and Gynecology

## 2023-07-09 NOTE — PLAN OF CARE
Assessments as charted. B/P: 100/57, T: 97.8, P: 78, R: 16. Rates pain: 2/10 uterine cramping and 4/10 upper back gas discomfort. Reports adequate pain control with tylenol and toradol. Encouraged ambulation and simethicone given for discomfort. Incision: Healing well, well approximated, and without signs of infection. Voiding without difficulty. Fundus firm @U-2. Lochia: Light. Activity: normal activity. Infant feeding: Breast feeding going well. IS instruction given and pt demonstrated use.     Postpartum breastfeeding assessment completed and education provided, see Patient Education Activity.  Items included in the education are:   proper positioning and latch  effectiveness of feeding  manual expression  handling and storing breastmilk  maintenance of breastfeeding for the first 6 months  sign/symptoms of infant feeding issues requiring referral to qualified health care provider  Postpartum care education provided, see Patient Education activity. Patient denies needs. Will monitor.  Ligia Kelley RN

## 2023-07-09 NOTE — PLAN OF CARE
Patient up ad nj in room with baby and spouse, VSS, reports adequate pain control with scheduled pain medication, surgical incision is clean, dry, and intact, no signs and symptoms of infection, voiding without difficulty, fundus is firm and U-1, light bleeding noted, patient complains of gas pain in her upper back and shoulders, simethicone given with relief, breastfeeding going well, pumping after each breastfeeding session, feeding baby via syringe. Patient has been ambulating in halls and showered. Will continue to monitor and provide cares as needed.

## 2023-07-09 NOTE — PLAN OF CARE
Face to face report given with opportunity to observe patient.    Report given to PAWAN Hernandez RN   7/8/2023  7:17 PM

## 2023-07-09 NOTE — PLAN OF CARE
Report given with opportunity to observe patient.  Report given to PAWAN Cruz.    Ligia Kelley RN  7/9/2023, 7:17 AM

## 2023-07-09 NOTE — PLAN OF CARE
Assessments as charted. B/P: 96/54, T: 97.7, P: 78, R: 16. Rates pain: 2/10 abdominal soreness and intermittent uterine cramping. Incision: Healing well, well approximated, and without signs of infection. Voiding without difficulty. Fundus Firm @ U-2. Lochia: Light. Activity: normal activity. Infant feeding: Breast feeding going well. Mom is also pumping, giving to baby via syringe after each breast feeding session.           Postpartum breastfeeding assessment completed and education provided, see Patient Education Activity.  Items included in the education are:   proper positioning and latch  effectiveness of feeding  manual expression  handling and storing breastmilk  maintenance of breastfeeding for the first 6 months  sign/symptoms of infant feeding issues requiring referral to qualified health care provider  Postpartum care education provided, see Patient Education activity. Patient denies needs. Will monitor.  Nancy Hobson RN

## 2023-07-10 VITALS
SYSTOLIC BLOOD PRESSURE: 97 MMHG | TEMPERATURE: 98 F | RESPIRATION RATE: 18 BRPM | WEIGHT: 118 LBS | DIASTOLIC BLOOD PRESSURE: 58 MMHG | HEART RATE: 68 BPM | OXYGEN SATURATION: 97 % | BODY MASS INDEX: 23.83 KG/M2

## 2023-07-10 PROCEDURE — 250N000013 HC RX MED GY IP 250 OP 250 PS 637: Performed by: OBSTETRICS & GYNECOLOGY

## 2023-07-10 RX ORDER — AMOXICILLIN 250 MG
1 CAPSULE ORAL 2 TIMES DAILY
Qty: 60 TABLET | Refills: 0 | Status: SHIPPED | OUTPATIENT
Start: 2023-07-10 | End: 2023-08-17

## 2023-07-10 RX ORDER — ACETAMINOPHEN 325 MG/1
975 TABLET ORAL EVERY 6 HOURS PRN
Qty: 100 TABLET | Refills: 0 | Status: SHIPPED | OUTPATIENT
Start: 2023-07-10 | End: 2023-08-17

## 2023-07-10 RX ORDER — IBUPROFEN 800 MG/1
800 TABLET, FILM COATED ORAL EVERY 6 HOURS PRN
Qty: 100 TABLET | Refills: 0 | Status: SHIPPED | OUTPATIENT
Start: 2023-07-10 | End: 2023-08-17

## 2023-07-10 RX ORDER — FERROUS GLUCONATE 324(38)MG
324 TABLET ORAL
Qty: 30 TABLET | Refills: 0 | Status: SHIPPED | OUTPATIENT
Start: 2023-07-10 | End: 2024-08-13

## 2023-07-10 RX ADMIN — IBUPROFEN 800 MG: 800 TABLET, FILM COATED ORAL at 02:22

## 2023-07-10 RX ADMIN — FERROUS GLUCONATE 324 MG: 324 TABLET ORAL at 07:28

## 2023-07-10 RX ADMIN — ACETAMINOPHEN 975 MG: 325 TABLET, FILM COATED ORAL at 01:01

## 2023-07-10 RX ADMIN — IBUPROFEN 800 MG: 800 TABLET, FILM COATED ORAL at 08:43

## 2023-07-10 RX ADMIN — ACETAMINOPHEN 975 MG: 325 TABLET, FILM COATED ORAL at 07:27

## 2023-07-10 ASSESSMENT — ACTIVITIES OF DAILY LIVING (ADL)
ADLS_ACUITY_SCORE: 20

## 2023-07-10 NOTE — PLAN OF CARE
Assessments as charted. B/P: 97/58, T: 98, P: 68, R: 18. Rates pain: 1/10 - see MAR. Incision: Healing well, well approximated, without signs of infection, and no drainage. Voiding without difficulty. Fundus firm. Lochia: scant. Activity: unrestricted with out pain. Infant feeding: Breast feeding going okay, supplementing with formula via bottle.           Postpartum breastfeeding assessment completed and education provided, see Patient Education Activity.  Items included in the education are:   proper positioning and latch  effectiveness of feeding  manual expression  handling and storing breastmilk  maintenance of breastfeeding for the first 6 months  sign/symptoms of infant feeding issues requiring referral to qualified health care provider  Postpartum care education provided, see Patient Education activity. Patient denies needs.   PAWAN Goddard RN on 7/10/2023 at 8:33 AM

## 2023-07-10 NOTE — PLAN OF CARE
Patient discharged at 10:34 AM via ambulation accompanied by spouse and staff. Prescriptions sent to patients preferred pharmacy. All belongings sent with patient.     Discharge instructions reviewed with patient. Listed belongings gathered and returned to patient.     Patient discharged to home.     Surgical Patient   Surgical Procedures during stay: yes  Did patient receive discharge instruction on wound care and recognition of infection symptoms? Yes    MISC  Follow up appointment made:  Yes  Home and hospital aquired medications returned to patient: N/A  Patient reports pain was well managed at discharge: Yes

## 2023-07-10 NOTE — PLAN OF CARE
Report given with opportunity to observe patient.    Report given to Eric Kelley RN   7/10/2023  7:27 AM

## 2023-07-10 NOTE — DISCHARGE INSTRUCTIONS
Post-op Discharge Instructions  section    Discharge Diet: Regular diet   Discharge Activity: Increase activity as tolerated. No vigorous activity or exercise for 6 weeks.    No swimming or bath for 7-10 days.    No driving or operating machinery for 2 weeks or while on narcotics.    Lifting restrictions up to 15 pounds for 6 weeks.    Pelvic rest for 6 weeks which includes intercourse, douching and tampons.   Discharge Nursing: Nursing is encouraged.    Schedule outpatient lactation follow up in 2-4 days.   Discharge Instructions: Seek immediate medical evaluation and assistance if you develop any warning signs of postpartum depression.    Avoid constipation and straining. Use stool softeners and fiber to avoid constipation.    Call the OB/GYN Clinic Nurse at [phone] for problems such as fever (temperature >100.4), chills, pain not controlled by usual oral pain medications, persistent nausea and vomiting, constipation, difficulty nursing, heavy odorous vaginal discharge, burning or pain associated with urination.  Call for excessive vaginal bleeding, saturating more than one pad an hour for more than three consecutive hours.  Call for any problems with the incision, drainage or redness from incision site or increasing pain.   Discharge Wound care: Keep your incisions clean and dry by running soapy water over them and dabbing them dry. It is best to shower for the first week while the healing process is underway; then after 7-10 days it is ok to take a bath.    The incision was closed with Steri-Strips which may fall off on their own. If they do not, then they may be removed after 10-14 days.   Discharge Follow-up: Follow up for outpatient lactation appointment in 2-4 days if desired.    Follow up for postpartum exam and incision check in 1-2 weeks with Dr. Hoang or Dagmar King NP.    Follow up for postpartum exam in 6 weeks with Dr. Hoang.    Follow up with Primary Care Provider as scheduled for  management of active medical problems and for adult preventive services.    Call Dr. Hoang at the OB/GYN Clinic at [phone] as necessary if you have problems in the interim.      [facility]: [phone]

## 2023-07-10 NOTE — DISCHARGE SUMMARY
OB  BIRTH DISCHARGE SUMMARY    Name: Trinh Gonzales  Age: 22 year old  YOB: 2000   Medical Record #: [id number]     Date of Admission:  2023  Date of Discharge:  7/10/2023  Admitting Physician:  Tai Hoang MD  Discharge Physician:  Tai Hoang MD  Discharging Service:  Obstetrics and Gynecology     Primary Provider:   Brandi Nolan         Admission Diagnoses:     IUGR (intrauterine growth restriction) affecting care of mother [O36.5990]  Poor fetal growth affecting management of mother in second trimester, single or unspecified fetus [O36.5920]          Discharge Diagnosis:     1. 22 year old  female at 38w6d, now delivered.  2. POD# 3 s/p Primary  section, doing well.  3. Nursing  4. Depression and anxiety  5. Anemia in pregnancy  6. Polycystic ovarian syndrome          Discharge Disposition:     Discharged to home.           Condition on Discharge:     Discharge condition: Stable   Discharge vitals: BP 98/60 (Cuff Size: Adult Regular)   Pulse 71   Temp 98.4  F (36.9  C) (Oral)   Resp 16   SpO2 97%   Breastfeeding Unknown    Code status on discharge: Full Code          Brief History of Illness:     Trinh Gonzales is a 22 year old female who was admitted for  section. Please see her admit H&P for full details of her PMH, PSH, Meds, Allergies and exam on admission.          Procedure Performed:     Primary Low Segment Transverse  Section  Spinal analgesia         Infant Delivery Information:     38w6d at time of delivery.  Delivery Date: 2023  Sex: Female  Weight: 3050 gm  APGAR 1 min: 8  APGAR 5 min: 9         Hospital Course:     Trinh Gonzales is a 22 year old female who was admitted to the hospital for the above listed indications. Please see her  Section Operative Note for full details regarding her delivery. The patient's hospital course was unremarkable. The Astudillo catheter was removed on POD# 1 and her  diet was advanced. She recovered as anticipated and her postoperative course was uncomplicated. On POD# 3, she was meeting all of her postpartum goals and deemed stable for discharge. She was ambulating well, voiding without difficulty, tolerating a regular diet without nausea and vomiting, her pain was well controlled on oral pain medicines and her lochia was appropriate. No fever or significant wound drainage. The patient has anemia in pregnancy. She received IV iron infusion. Breastfeeding well. Infant is stable. She will be discharged home in good condition on POD# 3. Her hemoglobin is 8.1. Her Blood type and Rh status is ) Positive, and Rhogam was not indicated.          Post-Partum Complications:     None.         Contraception:     The patient is undecided for contraception. To be discussed at Postpartum visit.         Medications Prior to Admission:     Medications Prior to Admission   Medication Sig Dispense Refill Last Dose     albuterol (PROAIR HFA/PROVENTIL HFA/VENTOLIN HFA) 108 (90 Base) MCG/ACT inhaler Inhale 2 puffs into the lungs every 4 hours as needed   More than a month     escitalopram (LEXAPRO) 10 MG tablet Take 1 tablet (10 mg) by mouth daily 90 tablet 3 7/6/2023 at 1200     metFORMIN (GLUCOPHAGE) 500 MG tablet TAKE 1 TABLET(500 MG) BY MOUTH TWICE DAILY WITH MEALS (Patient taking differently: Take 1,000 mg by mouth daily (with dinner)) 60 tablet 3 More than a month     Prenatal MV & Min w/FA-DHA (PRENATAL GUMMIES) 0.18-25 MG CHEW    7/6/2023 at 1200     [DISCONTINUED] acetaminophen (TYLENOL) 500 MG tablet Take 500-1,000 mg by mouth every 6 hours as needed   More than a month     [DISCONTINUED] ondansetron (ZOFRAN ODT) 4 MG ODT tab Take 1 tablet (4 mg) by mouth every 8 hours as needed for nausea or vomiting 50 tablet 3 More than a month     [DISCONTINUED] phenazopyridine (PYRIDIUM) 200 MG tablet Take 1 tablet (200 mg) by mouth 3 times daily as needed for irritation 6 tablet 0 More than a month              Discharge Medications:     Current Discharge Medication List      START taking these medications    Details   ferrous gluconate (FERGON) 324 (38 Fe) MG tablet Take 1 tablet (324 mg) by mouth daily (with breakfast)  Qty: 30 tablet, Refills: 0    Associated Diagnoses: Pregnancy, supervision of first, third trimester      ibuprofen (ADVIL/MOTRIN) 800 MG tablet Take 1 tablet (800 mg) by mouth every 6 hours as needed for moderate pain or fever  Qty: 100 tablet, Refills: 0    Associated Diagnoses: Pregnancy, supervision of first, third trimester      senna-docusate (SENOKOT-S/PERICOLACE) 8.6-50 MG tablet Take 1 tablet by mouth 2 times daily  Qty: 60 tablet, Refills: 0    Associated Diagnoses: Pregnancy, supervision of first, third trimester         CONTINUE these medications which have CHANGED    Details   acetaminophen (TYLENOL) 325 MG tablet Take 3 tablets (975 mg) by mouth every 6 hours as needed for mild pain, fever or headaches  Qty: 100 tablet, Refills: 0    Associated Diagnoses: Pregnancy, supervision of first, third trimester         CONTINUE these medications which have NOT CHANGED    Details   albuterol (PROAIR HFA/PROVENTIL HFA/VENTOLIN HFA) 108 (90 Base) MCG/ACT inhaler Inhale 2 puffs into the lungs every 4 hours as needed      escitalopram (LEXAPRO) 10 MG tablet Take 1 tablet (10 mg) by mouth daily  Qty: 90 tablet, Refills: 3    Associated Diagnoses: Anxiety      metFORMIN (GLUCOPHAGE) 500 MG tablet TAKE 1 TABLET(500 MG) BY MOUTH TWICE DAILY WITH MEALS  Qty: 60 tablet, Refills: 3    Associated Diagnoses: PCOS (polycystic ovarian syndrome)      Prenatal MV & Min w/FA-DHA (PRENATAL GUMMIES) 0.18-25 MG CHEW          STOP taking these medications       ondansetron (ZOFRAN ODT) 4 MG ODT tab Comments:   Reason for Stopping:         phenazopyridine (PYRIDIUM) 200 MG tablet Comments:   Reason for Stopping:                     Consultations:     No consultations were requested during this admission           Significant Results:     None.             Pending Results:     None.           Discharge Instructions and Follow-Up:     Discharge Diet: Regular   Discharge Activity: Increase activity as tolerated. No vigorous activity or exercise for 6 weeks.    No swimming or bath for 7-10 days.    No driving or operating machinery for 2 weeks or while on narcotics.    Lifting restrictions up to 15 pounds for 6 weeks.    Pelvic rest for 6 weeks which includes intercourse, douching and tampons.   Discharge Nursing: Nursing is encouraged.    Schedule outpatient lactation follow up in 2-4 days.   Discharge Instructions: The warning signs of postpartum depression have been reviewed, and the patient is aware that this can be a common occurrence. She is encouraged to seek immediate medical evaluation and assistance for any questions or concerns.    Instructions on avoiding constipation and straining are discussed. The patient is advised in the appropriate use of stool softeners to avoid constipation.    The patient will call the Ob/Gyn Clinic Nurse at [phone] for problems such as fever (temperature >100.4), chills, pain not controlled by usual oral pain medications, persistent nausea and vomiting, constipation, difficulty nursing, heavy odorous vaginal discharge, burning or pain associated with urination.  Call for excessive vaginal bleeding, saturating more than one pad an hour for more than three consecutive hours.  Call for any problems with the incision, drainage or redness from incision site or increasing pain.   Discharge Wound care: Routine incision care is discussed with the patient.     The patient is instructed to keep her incisions clean and dry by running soapy water over them and dabbing them dry. It is best to shower for the first week while the healing process is underway; after 7-10 days it is safe to take a bath.     The incision was closed with Steri-Strips which may fall off on their own. If they do not, then  they may be removed after 10-14 days.   Discharge Follow-up: Follow up for outpatient lactation appointment in 2-4 days if desired.    Follow up for postpartum exam and incision check in 1-2 weeks with Dr. Hoang or Dagmar King NP.    Follow up for postpartum exam in 6 weeks with Dr. Hoang.    Follow up with Primary Care Provider as scheduled for management of active medical problems and for adult preventive services.    Call Dr. Hoang at the OB/GYN Clinic at [phone] as necessary if you have problems in the interim.     Total time spent for discharge on date of discharge: 45 minutes  I saw the patient on the date of discharge.    Tai Hoang MD  Obstetrics and Gynecology

## 2023-07-10 NOTE — PLAN OF CARE
Face to face report given with opportunity to observe patient.    Report given to PAWAN Hernandez RN   7/9/2023  7:13 PM

## 2023-07-10 NOTE — PROGRESS NOTES
Name: Trinh Gonzales  Age: 22 year old  YOB: 2000   Medical Record #: [id number]     Postpartum Day 3:     DATE: 7/10/2023  SUBJECTIVE:  Patient is doing well. Her pain is well controlled with current medications. She reports normal lochia. She is tolerating a regular diet without nausea. She is ambulating and voiding spontaneously. She is passing flatus. The patient denies depression symptoms, headache, vision changes, shortness of breath, chest pain, fever or chills. The baby is well. She is nursing. She desires discharge.    OBJECTIVE:  BP 98/60 (Cuff Size: Adult Regular)   Pulse 71   Temp 98.4  F (36.9  C) (Oral)   Resp 16   SpO2 97%   Breastfeeding Unknown     No intake/output data recorded.    Well developed and well nourished female in no apparent distress. Alert and oriented. Lungs are clear to auscultation bilaterally. Heart is regular rate and rhythm. Abdomen is nondistended with positive bowel sounds. Uterine fundus is firm and palpated below the umbilicus. Incision is clean, dry, and intact without erythema or exudate. Perineum is clean, dry and intact. Extremities no edema, non-tender.    IMPRESSION :  1. 22 year old  female at 38w6d, now delivered.  2. POD# 3 s/p Primary  section, doing well.  3. Nursing  4. Depression and anxiety  5. Anemia in pregnancy  6. Polycystic ovarian syndrome    PLAN:  1. Continue routine post-partum and post-operative care.  2. Encourage nursing.  3. Encourage ambulation.  4. Reviewed home instructions in anticipation of discharge; these include being aware of warning signs of postpartum depression, symptoms of infection, avoiding constipation, iron replacement, and the need to seek medical evaluation for any questions or concerns.  5. Reviewed incision and wound care recommendations and bathing recommendations.  6. Options for birth control have been reviewed and a final decision and prescription for that will occur in the  postpartum clinic visit.  7. Repeat Serology / Treponema Pallidum Antibody Test obtained per [facility] protocol.  8. Anticipate discharge today in stable condition.    Tai Hoang MD  Obstetrics and Gynecology

## 2023-07-12 ENCOUNTER — HOSPITAL ENCOUNTER (OUTPATIENT)
Dept: OBGYN | Facility: HOSPITAL | Age: 23
Discharge: HOME | End: 2023-07-12
Attending: PHYSICAL MEDICINE & REHABILITATION | Admitting: PHYSICAL MEDICINE & REHABILITATION
Payer: COMMERCIAL

## 2023-07-12 PROCEDURE — G0463 HOSPITAL OUTPT CLINIC VISIT: HCPCS

## 2023-07-12 NOTE — LACTATION NOTE
Outpatient Lactation Visit    Trinh Gonzales                                                                                                   [id number]      Consultation Date: 2023     Reason for Lactation Referral: routine follow-up     Baby's :2023    Baby's Current Age: 5d  Baby's Gestation Age: 38w 6d    Primary Care Provider: Dr. Hoang/Dr. Reynoso    History of Present Illness: PCOS, Depression and Anxiety    MATERNAL HISTORY   History of Breast Surgery: No.  Breast Changes During Pregnancy:No  Breast Feeding History: No  Maternal Meds:Metforim, Lexapro, Tylenol, Ibuprofen, PNV, Iron  Pregnancy complications:IUGR  Delivery:p c/s - meternal request    MATERNAL ASSESSMENT    Breast Size: average, symmetrical, soft after feeding and filling prior to feeding  Nipple Appearance - Left: intact with no breakdown noted  Nipple Appearance - Right:wintact with no breakdown noted  Nipple Erectility - Left: erect with stimulation  Nipple Erectility - Right: erect with stimulation  Areolas Compressibility: soft  Nipple Size: average  Milk Supply: mature    INFANT ASSESSMENT    Oral Anatomy  Mouth: normal  Palate: normal  Jaw: normal  Tongue: normal  Frenulum: normal   Digital Suck Exam: not assessed     FEEDING     Birth Weight: 6 lb 11.6 oz    Discharge from Hospital after delivery weight: 6 lb 1.9 oz   TODAY 2023    Weight: 6 lb 6 oz    Output: 3-4 soil diapers in last 24 hrs, 4-6 wet diapers in last 24 hrs    No feeding session observed today. Naked weight obtained at start of appt. Mom reports feeding babe prior to arrival. Since home has not been to the breast - mom very concerned she was unable to know exactly how much milk babe was taking in. Switched to pumping and bottle feeding expressed breast milk. States this is desired feeding plan at this time. Mom reports pumping at home every 2 hours for 15 min. States she gets between 1.5-2.5 oz per side each time. Babe is on a strict  schedule of feeding every 2 hours around the clock to ensure adequate weight gain. Babe eats 40-60 mL per feeding. Appears satisfied and is retaining entire feeds with no spitting up noted. Has had adequate milk production and has not needed to supplement with formula. Adequate voiding and stooling noted. Education reviewed and mom denies any questions or concerns at this time.    FEEDING PLAN    Home Feeding Plan: Continue to feed babe every 2 hours until back to birth weight. Then can feed on demand when babe elicits feeding cues with goal of 8-12 feedings in a 24 hr period.   Rooming-in encouraged with explanation of the benefits.  Continue to apply expressed breast milk and Lanolin cream to nipples after feedings for healing and comfort.  Postpartum breastfeeding assessment completed and education provided.  Items included in the education are:     maternal nutritional needs    s/s of plugged ducts/mastitis    feeding cues    sterilization and cleaning of pumping materials and bottles    manual expression    handling and storing breastmilk    maintenance of breastfeeding for the first 6 months    sign/symptoms of infant feeding issues requiring referral to qualified health care provider    paced bottle feeding    LACTATION COMMENTS   Hand expression taught and return demonstration observed with mature milk present.  Reassurance and encouragement provided in regard to mom's concerns about milk supply.  Follow-up support information provided.  Parents plan to keep  Well-Child Check with Dr. Reynoso  for further support and monitoring.      Face-to-face Time: 30 minutes with assessment and education.    Ashley Fuller, RN, RNC-OB, IBCLC  Lactation Consultant  [facility]

## 2023-07-12 NOTE — PROGRESS NOTES
Name: Trinh Gonzales  Age: 22 year old  YOB: 2000   Medical Record #: [id number]     Fetal Non-Stress Test Results    NST Ordered By: Tai Hoang MD     Gestational Age: 38w2d       NST Start & Stop Times  Start: 959  Stop:        NST Results  Fetus A   Baseline Rate: 150  Variability: Present  Accelerations: Present  Decelerations: None  Interpretation: reactive and reassuring   Category:1            Tai Hoang MD  Obstetrics and Gynecology

## 2023-07-20 ENCOUNTER — PRENATAL OFFICE VISIT (OUTPATIENT)
Dept: OBGYN | Facility: OTHER | Age: 23
End: 2023-07-20
Attending: OBSTETRICS & GYNECOLOGY
Payer: COMMERCIAL

## 2023-07-20 VITALS
BODY MASS INDEX: 21.97 KG/M2 | HEIGHT: 59 IN | DIASTOLIC BLOOD PRESSURE: 62 MMHG | SYSTOLIC BLOOD PRESSURE: 90 MMHG | WEIGHT: 109 LBS

## 2023-07-20 PROBLEM — O21.9 NAUSEA AND VOMITING DURING PREGNANCY: Status: RESOLVED | Noted: 2022-12-22 | Resolved: 2023-07-20

## 2023-07-20 PROBLEM — O36.5920 POOR FETAL GROWTH AFFECTING MANAGEMENT OF MOTHER IN SECOND TRIMESTER: Status: RESOLVED | Noted: 2023-03-01 | Resolved: 2023-07-20

## 2023-07-20 PROBLEM — O36.5920 POOR FETAL GROWTH AFFECTING MANAGEMENT OF MOTHER IN SECOND TRIMESTER, SINGLE OR UNSPECIFIED FETUS: Status: RESOLVED | Noted: 2023-07-07 | Resolved: 2023-07-20

## 2023-07-20 PROCEDURE — 99207 PR NO CHARGE LOS: CPT | Performed by: OBSTETRICS & GYNECOLOGY

## 2023-07-20 ASSESSMENT — ANXIETY QUESTIONNAIRES
GAD7 TOTAL SCORE: 0
1. FEELING NERVOUS, ANXIOUS, OR ON EDGE: NOT AT ALL
3. WORRYING TOO MUCH ABOUT DIFFERENT THINGS: NOT AT ALL
7. FEELING AFRAID AS IF SOMETHING AWFUL MIGHT HAPPEN: NOT AT ALL
2. NOT BEING ABLE TO STOP OR CONTROL WORRYING: NOT AT ALL
6. BECOMING EASILY ANNOYED OR IRRITABLE: NOT AT ALL
GAD7 TOTAL SCORE: 0
5. BEING SO RESTLESS THAT IT IS HARD TO SIT STILL: NOT AT ALL
IF YOU CHECKED OFF ANY PROBLEMS ON THIS QUESTIONNAIRE, HOW DIFFICULT HAVE THESE PROBLEMS MADE IT FOR YOU TO DO YOUR WORK, TAKE CARE OF THINGS AT HOME, OR GET ALONG WITH OTHER PEOPLE: NOT DIFFICULT AT ALL

## 2023-07-20 ASSESSMENT — PATIENT HEALTH QUESTIONNAIRE - PHQ9
SUM OF ALL RESPONSES TO PHQ QUESTIONS 1-9: 2
5. POOR APPETITE OR OVEREATING: NOT AT ALL

## 2023-07-20 ASSESSMENT — PAIN SCALES - GENERAL: PAINLEVEL: NO PAIN (0)

## 2023-07-20 NOTE — PROGRESS NOTES
"POSTPARTUM VISIT    REASON FOR VISIT / CHEIF COMPLAINT  Postpartum exam.   section 2 week incision check.    HISTORY OF PRESENT ILLNESS  Trinh Gonzales is a 22 year old  female who is 2 weeks status post low transverse  section delivery on 2023 delivering a healthy baby girl. She presents for early postpartum visit and incision check today. She is doing well and has no complaints. Her infant is doing well.    Delivery complications: Intrauterine growth restriction  Breast feeding: Yes.  Bladder problems: No.  Bowel problems / hemorrhoids: No.  Episiotomy / laceration / incision healed: Yes.  Vaginal lochia: None.  Emotional adjustment: doing well and happy.  Depression symptoms or suicide thoughts: No.  Post delivery pain: No.  Incision concerns: No.  Contraception plans: Undecided.  Other concerns: No.    ALLERGIES     Allergies   Allergen Reactions     Apple Juice      Gums/mouth gets itchy  Regular apples  Ok to have apple juice-just allergic to \"raw apples\" per patient     Lazar      Gums/mouth gets itchy     Pistachios [Nuts]        MEDICATIONS    Current Outpatient Medications:      acetaminophen (TYLENOL) 325 MG tablet, Take 3 tablets (975 mg) by mouth every 6 hours as needed for mild pain, fever or headaches, Disp: 100 tablet, Rfl: 0     albuterol (PROAIR HFA/PROVENTIL HFA/VENTOLIN HFA) 108 (90 Base) MCG/ACT inhaler, Inhale 2 puffs into the lungs every 4 hours as needed, Disp: , Rfl:      escitalopram (LEXAPRO) 10 MG tablet, Take 1 tablet (10 mg) by mouth daily, Disp: 90 tablet, Rfl: 3     ferrous gluconate (FERGON) 324 (38 Fe) MG tablet, Take 1 tablet (324 mg) by mouth daily (with breakfast), Disp: 30 tablet, Rfl: 0     ibuprofen (ADVIL/MOTRIN) 800 MG tablet, Take 1 tablet (800 mg) by mouth every 6 hours as needed for moderate pain or fever, Disp: 100 tablet, Rfl: 0     metFORMIN (GLUCOPHAGE) 500 MG tablet, TAKE 1 TABLET(500 MG) BY MOUTH TWICE DAILY WITH MEALS (Patient " "taking differently: Take 1,000 mg by mouth daily (with dinner)), Disp: 60 tablet, Rfl: 3     Prenatal MV & Min w/FA-DHA (PRENATAL GUMMIES) 0.18-25 MG CHEW, , Disp: , Rfl:      senna-docusate (SENOKOT-S/PERICOLACE) 8.6-50 MG tablet, Take 1 tablet by mouth 2 times daily, Disp: 60 tablet, Rfl: 0    REVIEW OF SYSTEMS  As per HPI, otherwise negative.    The patient's Medical Hx, Surgical Hx, Obstetrical Hx, Social Hx, and Family Hx have been reviewed and updated in the electronic medical record.    OBJECTIVE  BP 90/62   Ht 1.499 m (4' 11\")   Wt 49.4 kg (109 lb)   Breastfeeding Yes   BMI 22.02 kg/m      General:  Well-developed, well-nourished female in no apparent distress.  Neurological: Alert and oriented x3.  Breast: Deferred.  Abdomen: Soft, nontender, nondistended, positive bowel sounds.  No organomegaly. No rebound, no guarding. Fundus is firm and nontender above pubic symphysis. Incision is clean dry and intact.  No erythema, induration or signs of infection.  Pelvic exam: Deferred.  Extremities:  No clubbing cyanosis or edema. Nontender bilaterally.    DIAGNOSTICS  2023 Pap NILM    PHQ-9 score:       2023     9:57 AM   PHQ   PHQ-9 Total Score 2   Q9: Thoughts of better off dead/self-harm past 2 weeks Not at all       ASSESSMENT / PLAN  Trinh Gonzales is a 22 year old  female who is 2 weeks status post low transverse  section delivery on 2023.    1 Normal early postpartum exam after  section  2 Incision healing well status post  section on 2023  - The patient is making excellent postpartum progress.  - I reviewed the operation report and the indications for her  section. She had a low segment transverse uterine incision. I discussed the option of a trial of labor versus a scheduled repeat  section with her future pregnancies.  - The patient is a candidate for a trial of labor and Vaginal Birth after  section () attempt with " her next pregnancy.  - Routine postpartum precautions, recommendations and restrictions are reviewed with the patient.  - Routine postpartum depression precautions are reviewed with the patient.  - Routine postop activity recommendations and restrictions are reviewed with the patient.  - Routine postoperative precautions, restrictions and recommendations are reviewed with the patient.  - Routine incision care instructions are reviewed with the patient.  - I recommend that the patient refrain from intercourse for 6-8 weeks after delivery.  I recommend the patient be on reliable contraception before resuming intercourse.    3 Nursing  - Nursing is encouraged.  - Follow up with outpatient lactation appointment as needed.    4 Anemia  - I recommend patient continue with iron supplementation as prescribed.    5 Depression and Anxiety  - I recommend patient continues to take Lexapro as prescribed.  - I recommend patient see her mental health provider for depression and anxiety follow-up    6 Polycystic ovarian syndrome  - I recommend the patient continue with Metformin as prescribed.    - All of the patient's questions were answered.  - Problem list reviewed and updated.  - Follow up in 4 weeks for postpartum visit including physical examination, pelvic examination and Pap smear as needed.    Tai Hoang MD  Obstetrics and Gynecology

## 2023-07-23 ENCOUNTER — HEALTH MAINTENANCE LETTER (OUTPATIENT)
Age: 23
End: 2023-07-23

## 2023-08-08 ENCOUNTER — MEDICAL CORRESPONDENCE (OUTPATIENT)
Dept: PEDIATRICS | Facility: OTHER | Age: 23
End: 2023-08-08

## 2023-08-17 ENCOUNTER — PRENATAL OFFICE VISIT (OUTPATIENT)
Dept: OBGYN | Facility: OTHER | Age: 23
End: 2023-08-17
Attending: OBSTETRICS & GYNECOLOGY
Payer: COMMERCIAL

## 2023-08-17 VITALS
SYSTOLIC BLOOD PRESSURE: 92 MMHG | WEIGHT: 108 LBS | BODY MASS INDEX: 21.77 KG/M2 | HEIGHT: 59 IN | DIASTOLIC BLOOD PRESSURE: 62 MMHG

## 2023-08-17 DIAGNOSIS — Z30.015 ENCOUNTER FOR INITIAL PRESCRIPTION OF VAGINAL RING HORMONAL CONTRACEPTIVE: ICD-10-CM

## 2023-08-17 DIAGNOSIS — O99.019 ANEMIA DURING PREGNANCY: ICD-10-CM

## 2023-08-17 PROBLEM — Z34.03 PREGNANCY, SUPERVISION OF FIRST, THIRD TRIMESTER: Status: RESOLVED | Noted: 2022-12-22 | Resolved: 2023-08-17

## 2023-08-17 LAB
ERYTHROCYTE [DISTWIDTH] IN BLOOD BY AUTOMATED COUNT: 14.5 % (ref 10–15)
HCT VFR BLD AUTO: 37.8 % (ref 35–47)
HGB BLD-MCNC: 12.1 G/DL (ref 11.7–15.7)
MCH RBC QN AUTO: 27.9 PG (ref 26.5–33)
MCHC RBC AUTO-ENTMCNC: 32 G/DL (ref 31.5–36.5)
MCV RBC AUTO: 87 FL (ref 78–100)
PLATELET # BLD AUTO: 237 10E3/UL (ref 150–450)
RBC # BLD AUTO: 4.33 10E6/UL (ref 3.8–5.2)
WBC # BLD AUTO: 5.7 10E3/UL (ref 4–11)

## 2023-08-17 PROCEDURE — 99207 PR POST PARTUM EXAM: CPT | Performed by: OBSTETRICS & GYNECOLOGY

## 2023-08-17 PROCEDURE — 36415 COLL VENOUS BLD VENIPUNCTURE: CPT | Performed by: OBSTETRICS & GYNECOLOGY

## 2023-08-17 PROCEDURE — 85027 COMPLETE CBC AUTOMATED: CPT | Performed by: OBSTETRICS & GYNECOLOGY

## 2023-08-17 RX ORDER — ETONOGESTREL AND ETHINYL ESTRADIOL VAGINAL RING .015; .12 MG/D; MG/D
RING VAGINAL
Qty: 3 EACH | Refills: 4 | Status: SHIPPED | OUTPATIENT
Start: 2023-08-17 | End: 2023-08-31

## 2023-08-17 ASSESSMENT — ANXIETY QUESTIONNAIRES
3. WORRYING TOO MUCH ABOUT DIFFERENT THINGS: NOT AT ALL
IF YOU CHECKED OFF ANY PROBLEMS ON THIS QUESTIONNAIRE, HOW DIFFICULT HAVE THESE PROBLEMS MADE IT FOR YOU TO DO YOUR WORK, TAKE CARE OF THINGS AT HOME, OR GET ALONG WITH OTHER PEOPLE: NOT DIFFICULT AT ALL
5. BEING SO RESTLESS THAT IT IS HARD TO SIT STILL: NOT AT ALL
6. BECOMING EASILY ANNOYED OR IRRITABLE: NOT AT ALL
7. FEELING AFRAID AS IF SOMETHING AWFUL MIGHT HAPPEN: NOT AT ALL
2. NOT BEING ABLE TO STOP OR CONTROL WORRYING: NOT AT ALL
GAD7 TOTAL SCORE: 0
1. FEELING NERVOUS, ANXIOUS, OR ON EDGE: NOT AT ALL
GAD7 TOTAL SCORE: 0

## 2023-08-17 ASSESSMENT — PATIENT HEALTH QUESTIONNAIRE - PHQ9
SUM OF ALL RESPONSES TO PHQ QUESTIONS 1-9: 2
5. POOR APPETITE OR OVEREATING: NOT AT ALL

## 2023-08-17 ASSESSMENT — PAIN SCALES - GENERAL: PAINLEVEL: NO PAIN (0)

## 2023-08-17 NOTE — PROGRESS NOTES
"POSTPARTUM VISIT    REASON FOR VISIT / CHEIF COMPLAINT  Postpartum exam.    HISTORY OF PRESENT ILLNESS  Trinh Gonzales is a 22 year old  female who is 6 weeks status post low transverse  section delivery on 2023 delivering a healthy baby girl. She presents for postpartum visit today. She is doing well and has no complaints.  She denies pain, cramping, tenderness, or discomfort.  No vaginal bleeding or abnormal vaginal discharge.  She denies difficulty urinating or change in bowel habits.  No fever or chills.  She denies depression or anxiety symptoms on Lexapro.  She is taking metformin for polycystic ovarian syndrome. She is nursing. Her infant is doing well.    Delivery complications:  Intrauterine growth restriction .  Breast feeding: Yes.  Bladder problems: No.  Bowel problems / hemorrhoids: No.  Episiotomy / laceration / incision healed: Yes.  Vaginal lochia: None.  Menses since delivery: No.  [place] since delivery: No.  Emotional adjustment: doing well and happy.  Depression symptoms or suicide thoughts: No.  Post delivery pain: No.  Incision concerns: No.  Contraception plans: NuvaRing.  Other concerns: No.    ALLERGIES     Allergies   Allergen Reactions    Apple Juice      Gums/mouth gets itchy  Regular apples  Ok to have apple juice-just allergic to \"raw apples\" per patient    Lazar      Gums/mouth gets itchy    Pistachios [Nuts]        MEDICATIONS    Current Outpatient Medications:     albuterol (PROAIR HFA/PROVENTIL HFA/VENTOLIN HFA) 108 (90 Base) MCG/ACT inhaler, Inhale 2 puffs into the lungs every 4 hours as needed, Disp: , Rfl:     escitalopram (LEXAPRO) 10 MG tablet, Take 1 tablet (10 mg) by mouth daily, Disp: 90 tablet, Rfl: 3    etonogestrel-ethinyl estradiol (NUVARING) 0.12-0.015 MG/24HR vaginal ring, Insert one (1) ring vaginally and leave in place for 3 consecutive weeks (21 days), then remove for 1 week., Disp: 3 each, Rfl: 4    ferrous gluconate (FERGON) 324 (38 " Fe) MG tablet, Take 1 tablet (324 mg) by mouth daily (with breakfast), Disp: 30 tablet, Rfl: 0    metFORMIN (GLUCOPHAGE) 500 MG tablet, TAKE 1 TABLET(500 MG) BY MOUTH TWICE DAILY WITH MEALS (Patient taking differently: Take 1,000 mg by mouth daily (with dinner)), Disp: 60 tablet, Rfl: 3    Prenatal MV & Min w/FA-DHA (PRENATAL GUMMIES) 0.18-25 MG CHEW, , Disp: , Rfl:     REVIEW OF SYSTEMS  General:  No fever, chills, fatigue, unintentional weight loss, or weight gain  HEENT:  No sore throat, nasal congestion, changes in vision or changes in hearing  Cardiovascular:  No chest pain, irregular heartbeat or racing heart  Respiratory:  No shortness of breath, cough, or wheezing  Gastrointestinal:  No nausea, vomiting, diarrhea, constipation or abdominal pain  Genitourinary:  No leaking urine, pain with urination, burning with urination, irregular vaginal bleeding, heavy periods, painful periods, abnormal vaginal discharge or leaking gas or stool  Skin:  No rashes or skin lesions  Breasts:  No masses or lumps, positive for discharge from the nipples  Neuro:  No difficulty with memory, numbness, tingling, or falls  Psych:  No anxiety, depression or difficulty sleeping  Endocrine:  No excessive thirst, hair loss, intolerance of heat or cold    Past Medical History:   Diagnosis Date    Adjustment disorder with depressed mood 01/22/2018    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 IMO Update    Irregular menses 05/23/2022    Nephrolithiasis 01/24/2023    S/P Left ureteroscopy with laser lithotripsy and stent placement    PCOS (polycystic ovarian syndrome) 07/18/2022    Primary oligomenorrhea 05/23/2022    PTSD (post-traumatic stress disorder) 01/22/2018    Suicidal behavior 05/08/2016    [place], premature 07/29/2008    Overview:  Asymmetric.  Slight nipple enlargement on left  "side.  Right side has breast tissue which is probably 3-4 cm. IMO Update 10/11  Formatting of this note might be different from the original. Asymmetric.  Slight nipple enlargement on left side.  Right side has breast tissue which is probably 3-4 cm. IMO Update 10/11       Past Surgical History:   Procedure Laterality Date     SECTION N/A 2023    Procedure:  SECTION girl at 0758;  Surgeon: Tai Hoang MD;  Location: [facility]    COMBINED CYSTOSCOPY, URETEROSCOPY, LASER HOLMIUM LITHOTRIPSY URETER(S) Left 2023    Procedure: Left ureteroscopy with laser lithotripsy and stent placement;  Surgeon: James Hanley MD;  Location: [facility]    COMBINED CYSTOSCOPY, URETEROSCOPY, LASER HOLMIUM LITHOTRIPSY URETER(S) Left 3/9/2023    Procedure: Left ureteroscopy with laser lithotripsy and stent placement;  Surgeon: James Hanley MD;  Location: [facility] OR    WISDOM TOOTH EXTRACTION Bilateral        OB History    Para Term  AB Living   1 1 1 0 0 1   SAB IAB Ectopic Multiple Live Births   0 0 0 0 1      # Outcome Date GA Lbr Gavin/2nd Weight Sex Delivery Anes PTL Lv   1 Term 23 38w6d  3.05 kg (6 lb 11.6 oz) F CS-LTranv Spinal N CHANCE      Name: JUNE BROWNE-DEVORA      Apgar1: 8  Apgar5: 9       Social History     Tobacco Use    Smoking status: Never    Smokeless tobacco: Never   Substance Use Topics    Alcohol use: No     History   Sexual Activity    Sexual activity: Yes    Partners: Male    Birth control/ protection: Inserts/Ring       Family History   Problem Relation Age of Onset    Mental Illness Mother     Mental Illness Father      OBJECTIVE  BP 92/62   Ht 1.499 m (4' 11\")   Wt 49 kg (108 lb)   Breastfeeding Yes   BMI 21.81 kg/m      General:  Well-developed, well-nourished female in no apparent distress.  Neurological: Alert and oriented x3.  Skin:  No rashes or lesions  Breast:  Breasts are bilaterally symmetrical. No masses, tenderness, retractions, skin changes, erythema, " discharge or adenopathy bilaterally.  Lungs:  Clear to auscultation bilaterally with good inspiratory effort.  No wheezing rhonchi or rales noted. Breathing nonlabored.  Heart:  Regular rate and rhythm without murmur. No JVD.  No peripheral vascular disease.  Abdomen: Soft, nontender, nondistended, positive bowel sounds.  No organomegaly. No rebound, no guarding. Incision is well healed.  Pelvic exam:  Normal external female genitalia without lesions or abnormalities. Normal pubic hair distribution. Urethral meatus normal in appearance and without masses. Normal Bartholin, Urethral and [place]'s glands. Vaginal mucosa is pink and moist with a small amount of physiologic discharge present in the posterior vaginal fornix.  Well estrogenized vaginal mucosa.   Normal multiparous cervix without lesions or abnormalities. No cervical motion tenderness to palpation. The bladder and urethra are nontender to palpation. Uterus is normal size, shape, consistency, anteflexed, mobile, and nontender. No adnexal masses or tenderness bilaterally.  Rectal exam:  No external hemorrhoids noted. No rectovaginal nodularity noted. Examination confirms the vaginal exam.  Extremities:  No clubbing cyanosis or edema. Nontender bilaterally.  Spine:  No costovertebral angle tenderness bilaterally.     Chaperone: Andreia Nagy LPN    DIAGNOSTICS  2023 Pap NILM     CBC Results (Last 2)  Recent Labs   Lab Test 23  1013 23  0616   WBC 5.7 12.7*   RBC 4.33 2.85*   HGB 12.1 8.1*   HCT 37.8 25.3*   MCV 87 89    164      PHQ-9 score:       2023     9:59 AM   PHQ   PHQ-9 Total Score 2   Q9: Thoughts of better off dead/self-harm past 2 weeks Not at all       ASSESSMENT / PLAN  Trinh Gonzales is a 22 year old  female who is 6 weeks status post low transverse  section delivery on 2023.    1 Normal postpartum exam after  section  - The patient is making excellent postpartum progress.  - I reviewed  the operation report and the indications for her  section. She had a low segment transverse uterine incision. I discussed the option of a trial of labor versus a scheduled repeat  section with her future pregnancies.  - The patient is a candidate for a trial of labor and Vaginal Birth after  section () attempt with her next pregnancy.  - Routine postpartum precautions, recommendations and restrictions are reviewed with the patient.  - Routine postpartum depression precautions are reviewed with the patient.  - Routine postoperative precautions, restrictions and recommendations are reviewed with the patient.  - Routine incision care instructions are reviewed with the patient.  - The patient may resume all normal activities without restrictions.  - I recommend that the patient refrain from intercourse for 6-8 weeks after delivery.  I recommend the patient be on reliable contraception before resuming intercourse.  - Pap smear up-to-date.    2 Contraception counseling  - I discussed contraception options available to the patient including oral contraceptive pills both continuous and cyclic, progestin only oral contraceptive pills, Ortho Evra patch, NuvaRing, Depo-Provera injections, Nexplanon, Mirena IUD, ParaGard IUD, Heidi IUD and condoms.  I reviewed the risks, benefits, alternatives, indications and side effects of each of these contraception options with the patient.  - The patient will use NuvaRing for birth control.  - I gave the patient a prescription for NuvaRing # 3 with 4 refills with instructions.  - All questions were answered.  - Contraceptive compliance was emphasized.    3 Nursing  - Nursing is encouraged.  - Follow up with outpatient lactation appointment as needed.    4 Anemia in pregnancy, resolved  - The patient's anemia has resolved.    5 Depression and Anxiety  - I recommend patient continues to take Lexapro as prescribed.  - I recommend patient see her mental health  provider for depression and anxiety follow-up     6 Polycystic ovarian syndrome  - I recommend the patient continue with Metformin as prescribed.    - I recommend that the patient follow up with her primary provider for adult preventive services and annual examination as needed.  - All of the patient's questions were answered.  - I will be happy to see the patient on an as needed basis.    - Problem list reviewed and updated.  - Follow up annually or sooner as needed.    Tai Hoang MD  Obstetrics and Gynecology

## 2023-08-31 ENCOUNTER — OFFICE VISIT (OUTPATIENT)
Dept: OBGYN | Facility: OTHER | Age: 23
End: 2023-08-31
Attending: OBSTETRICS & GYNECOLOGY
Payer: COMMERCIAL

## 2023-08-31 VITALS
WEIGHT: 108 LBS | BODY MASS INDEX: 21.77 KG/M2 | SYSTOLIC BLOOD PRESSURE: 90 MMHG | HEIGHT: 59 IN | DIASTOLIC BLOOD PRESSURE: 60 MMHG

## 2023-08-31 DIAGNOSIS — Z30.430 ENCOUNTER FOR INSERTION OF INTRAUTERINE CONTRACEPTIVE DEVICE: Primary | ICD-10-CM

## 2023-08-31 PROCEDURE — 58300 INSERT INTRAUTERINE DEVICE: CPT | Performed by: OBSTETRICS & GYNECOLOGY

## 2023-08-31 RX ORDER — COPPER 313.4 MG/1
1 INTRAUTERINE DEVICE INTRAUTERINE ONCE
COMMUNITY

## 2023-08-31 RX ORDER — COPPER 313.4 MG/1
1 INTRAUTERINE DEVICE INTRAUTERINE CONTINUOUS
Status: ACTIVE
Start: 2023-08-31

## 2023-08-31 RX ORDER — COPPER 313.4 MG/1
1 INTRAUTERINE DEVICE INTRAUTERINE ONCE
Status: DISPENSED
Start: 2023-08-31

## 2023-08-31 RX ADMIN — COPPER 1 EACH: 313.4 INTRAUTERINE DEVICE INTRAUTERINE at 10:14

## 2023-08-31 ASSESSMENT — PAIN SCALES - GENERAL: PAINLEVEL: NO PAIN (0)

## 2023-08-31 NOTE — PROCEDURES
"GYN Procedure Note     PROCEDURE PERFORMED  IUD Insertion  IUD Type: ParaGard    INDICATION  1 IUD insertion.  2 Patient desires long term reversible contraception.    OBJECTIVE  BP 90/60   Ht 1.499 m (4' 11\")   Wt 49 kg (108 lb)   Breastfeeding Yes   BMI 21.81 kg/m      Please see separate note for details of physical examination.    PROCEDURE  I reviewed the risks, benefits, alternatives, indication and expected outcome of IUD insertion with the patient. The patient verbalized understanding and desired to proceed. Consent form was signed.  After informed consent was obtained from the patient, the patient was prepped and draped in the usual fashion. A speculum was placed in the vagina to visualize the cervix. The cervix was then swabbed with betadine. A tenaculum was applied to the anterior cervical lip. An endometrial pipelle was passed through the cervical os into the uterine cavity and the uterus sounded to 7 cm. The ParaGard device was opened, inspected and found to be intact. The ParaGard was loaded in a sterile manner into the introduction catheter. The ParaGard insertion device was set to the 7 cm junaid. The introduction catheter was passed through the cervical os into the endometrial cavity and the ParaGard IUD was deployed at the 7 cm junaid in the usual fashion. The IUD introduction catheter was removed. The IUD strings were trimmed to 3 cm length. The tenaculum was removed from the cervix and the cervix was hemostatic.  All instruments were removed from the vagina. There were no complications. The patient tolerated the procedure well with a minimal amount of cramping noted.    Chaperone: Andreia Hoang MD  Obstetrics and Gynecology    "

## 2023-08-31 NOTE — PROGRESS NOTES
"CHIEF COMPLAINT / REASON FOR VISIT  Patient requests ParaGard IUD for long term contraction.    HISTORY OF PRESENT ILLNESS  Trinh Gonzales is a 22 year old  with No LMP recorded. who presents requesting ParaGard IUD for long-term contraception. She is currently using abstinence for contraception.  She had a  section at 2023 and has not had intercourse since delivery.  The patient was prescribed NuvaRing for contraception but she decided to not use the NuvaRing.  The patient desires ParaGard IUD for long-term contraception.  She has had a ParaGard IUD in the past.  The patient is nursing exclusively.    MENSTRUAL HISTORY  Menarche was at age 12-13.  Menses: Amenorrhea since delivery  She is not sexually active since delivery and denies issues with intercourse.   Dyspareunia: Denies.  Postcoital spotting: Denies.  Current contraception: abstinence.  STD History: No STD history.  Last Pap smear history: Normal.  Mammogram history: N/A.    ALLERGIES     Allergies   Allergen Reactions    Apple Juice      Gums/mouth gets itchy  Regular apples  Ok to have apple juice-just allergic to \"raw apples\" per patient    Lazar      Gums/mouth gets itchy    Pistachios [Nuts]        MEDICATIONS    Current Outpatient Medications:     albuterol (PROAIR HFA/PROVENTIL HFA/VENTOLIN HFA) 108 (90 Base) MCG/ACT inhaler, Inhale 2 puffs into the lungs every 4 hours as needed, Disp: , Rfl:     escitalopram (LEXAPRO) 10 MG tablet, Take 1 tablet (10 mg) by mouth daily, Disp: 90 tablet, Rfl: 3    ferrous gluconate (FERGON) 324 (38 Fe) MG tablet, Take 1 tablet (324 mg) by mouth daily (with breakfast), Disp: 30 tablet, Rfl: 0    metFORMIN (GLUCOPHAGE) 500 MG tablet, TAKE 1 TABLET(500 MG) BY MOUTH TWICE DAILY WITH MEALS (Patient taking differently: Take 1,000 mg by mouth daily (with dinner)), Disp: 60 tablet, Rfl: 3    paragard intrauterine copper device, 1 each by Intrauterine route once, Disp: , Rfl:     Prenatal MV & Min " "w/FA-DHA (PRENATAL GUMMIES) 0.18-25 MG CHEW, , Disp: , Rfl:     Current Facility-Administered Medications:     paragard intrauterine copper IUD device 1 each, 1 each, Intrauterine, Once, Tai Hoang MD    paragard intrauterine copper IUD device 1 each, 1 each, Intrauterine, Continuous, Tai Hoang MD, 1 each at 08/31/23 1014    REVIEW OF SYSTEMS  As per HPI otherwise negative.    The patient's Medical Hx, Surgical Hx, Obstetrical Hx, Social Hx, and Family Hx have been reviewed and updated in the electronic medical record.    OBJECTIVE  BP 90/60   Ht 1.499 m (4' 11\")   Wt 49 kg (108 lb)   Breastfeeding Yes   BMI 21.81 kg/m      General:  Well-developed, well-nourished female in no apparent distress.  Neurological: Alert and oriented x3.  Lungs:  Clear to auscultation bilaterally with good inspiratory effort.  No wheezing rhonchi or rales noted. Breathing nonlabored.  Heart:  Regular rate and rhythm without murmur. No JVD.  No peripheral vascular disease.  Abdomen: Soft, nontender, nondistended, positive bowel sounds.  No organomegaly. No rebound, no guarding.  Pelvic exam:  Normal external female genitalia without lesions or abnormalities. Normal pubic hair distribution. Urethral meatus normal in appearance and without masses. Normal Bartholin, Urethral and [place]'s glands. Vaginal mucosa is pink and moist with a small amount of physiologic discharge present in the posterior vaginal fornix. No vaginal lesions.  Normal multiparous cervix without lesions or abnormalities. No cervical motion tenderness to palpation. The bladder and urethra are nontender to palpation. Uterus is normal size, shape, consistency, anteflexed, mobile, and nontender.  No adnexal masses or tenderness bilaterally.  Rectal exam:  No external hemorrhoids noted. No rectovaginal nodularity noted. Examination confirms the vaginal exam.  Extremities:  No clubbing cyanosis or edema. Nontender bilaterally.     Chaperone: Andreia Nagy " LPN    DIAGNOSTICS  2023 Pap NILM      PROCEDURE:  ParaGard IUD was inserted. Please see separate note for details.    ASSESSMENT / PLAN  Trinh Gonzales is a 22 year old  female who presents requesting IUD for long-term contraception.    1 Contraception counseling  - The patient is currently using abstinence for contraception.  She has not had intercourse since delivery.  - I discussed contraception options available to the patient including oral contraceptive pills both continuous and cyclic, Ortho Evra patch, NuvaRing, Depo-Provera injections, Nexplanon, Mirena IUD, ParaGard IUD, Heidi IUD, and condoms.  I reviewed the risks, benefits, alternatives, indications and side effects of each of these contraception options with the patient.  - The patient requests Paragaurd IUD for contraception.  - The risks, benefits, alternative and indications of intrauterine device (IUD) were discussed with the patient. I discussed risk of bleeding and postcoital spotting with an IUD. I discussed the risk of amenorrhea with the Mirena IUD.  I discussed increased risk of intrauterine infection and sexually transmitted disease with an IUD.  I discussed the risk of pain and cramping, uterine perforation and dyspareunia. I discussed the risk of IUD expulsion. I discussed that the IUD does not protect against sexually transmitted disease and that condom usage is recommended for STD prevention. I discussed the risk that pregnancy can occur with the IUD in place and to alert us if she has symptoms of pregnancy and a positive pregnancy test. I discussed the increased risk of ectopic pregnancy with an IUD. The patient verbalizes understanding and desires to proceed.  - The consent form was reviewed and signed.  - I recommend the patient remain abstinent for at least 7 days to allow proper healing after IUD placement.  - The patient should follow back up in clinic if she develops any problems after IUD placement.  - I  reviewed IUD string checks with the patient.  - I reviewed with the patient that a ParaGard IUD is effective for 10 years will need to be removed or replaced 10 years from the insertion date.  - I reviewed sexually transmitted disease precautions with the patient.  - All the patient's questions were answered.    2 IUD insertion  - A Paragaurd IUD was inserted as mentioned above without difficulty. The patient tolerated the procedure well.  - Please see separate note for details.    3 Nursing  - Nursing is encouraged.  - Follow up with outpatient lactation appointment as needed.    - Problem list reviewed and updated.  - Follow up annually or sooner as needed    Tai Hoang MD  Obstetrics and Gynecology

## 2023-09-08 ENCOUNTER — MEDICAL CORRESPONDENCE (OUTPATIENT)
Dept: HEALTH INFORMATION MANAGEMENT | Facility: HOSPITAL | Age: 23
End: 2023-09-08

## 2023-12-05 DIAGNOSIS — E28.2 PCOS (POLYCYSTIC OVARIAN SYNDROME): ICD-10-CM

## 2023-12-05 NOTE — TELEPHONE ENCOUNTER
Metformin      Last Written Prescription Date:  11/15/22  Last Fill Quantity: 60,   # refills: 3  Last Office Visit: 8/31/23  Future Office visit:       Routing refill request to provider for review/approval because:

## 2024-04-17 DIAGNOSIS — F41.9 ANXIETY: ICD-10-CM

## 2024-04-17 RX ORDER — ESCITALOPRAM OXALATE 10 MG/1
10 TABLET ORAL DAILY
Qty: 90 TABLET | Refills: 0 | Status: SHIPPED | OUTPATIENT
Start: 2024-04-17 | End: 2024-08-12

## 2024-04-17 NOTE — TELEPHONE ENCOUNTER
Lexapro      Last Written Prescription Date:  3/17/23  Last Fill Quantity: 90,   # refills: 3  Last Office Visit: 3/17/23  Future Office visit:       Routing refill request to provider for review/approval because:         High Dose Vitamin A Pregnancy And Lactation Text: High dose vitamin A therapy is contraindicated during pregnancy and breast feeding.

## 2024-04-27 SDOH — HEALTH STABILITY: PHYSICAL HEALTH: ON AVERAGE, HOW MANY DAYS PER WEEK DO YOU ENGAGE IN MODERATE TO STRENUOUS EXERCISE (LIKE A BRISK WALK)?: 3 DAYS

## 2024-04-27 SDOH — HEALTH STABILITY: PHYSICAL HEALTH: ON AVERAGE, HOW MANY MINUTES DO YOU ENGAGE IN EXERCISE AT THIS LEVEL?: 60 MIN

## 2024-04-27 ASSESSMENT — SOCIAL DETERMINANTS OF HEALTH (SDOH): HOW OFTEN DO YOU GET TOGETHER WITH FRIENDS OR RELATIVES?: TWICE A WEEK

## 2024-05-03 NOTE — PATIENT INSTRUCTIONS
Preventive Care Advice   This is general advice given by our system to help you stay healthy. However, your care team may have specific advice just for you. Please talk to your care team about your preventive care needs.  Nutrition  Eat 5 or more servings of fruits and vegetables each day.  Try wheat bread, brown rice and whole grain pasta (instead of white bread, rice, and pasta).  Get enough calcium and vitamin D. Check the label on foods and aim for 100% of the RDA (recommended daily allowance).  Lifestyle  Exercise at least 150 minutes each week   (30 minutes a day, 5 days a week).  Do muscle strengthening activities 2 days a week. These help control your weight and prevent disease.  No smoking.  Wear sunscreen to prevent skin cancer.  Have a dental exam and cleaning every 6 months.  Yearly exams  See your health care team every year to talk about:  Any changes in your health.  Any medicines your care team has prescribed.  Preventive care, family planning, and ways to prevent chronic diseases.  Shots (vaccines)   HPV shots (up to age 26), if you've never had them before.  Hepatitis B shots (up to age 59), if you've never had them before.  COVID-19 shot: Get this shot when it's due.  Flu shot: Get a flu shot every year.  Tetanus shot: Get a tetanus shot every 10 years.  Pneumococcal, hepatitis A, and RSV shots: Ask your care team if you need these based on your risk.  Shingles shot (for age 50 and up).  General health tests  Diabetes screening:  Starting at age 35, Get screened for diabetes at least every 3 years.  If you are younger than age 35, ask your care team if you should be screened for diabetes.  Cholesterol test: At age 39, start having a cholesterol test every 5 years, or more often if advised.  Bone density scan (DEXA): At age 50, ask your care team if you should have this scan for osteoporosis (brittle bones).  Hepatitis C: Get tested at least once in your life.  STIs (sexually transmitted  infections)  Before age 24: Ask your care team if you should be screened for STIs.  After age 24: Get screened for STIs if you're at risk. You are at risk for STIs (including HIV) if:  You are sexually active with more than one person.  You don't use condoms every time.  You or a partner was diagnosed with a sexually transmitted infection.  If you are at risk for HIV, ask about PrEP medicine to prevent HIV.  Get tested for HIV at least once in your life, whether you are at risk for HIV or not.  Cancer screening tests  Cervical cancer screening: If you have a cervix, begin getting regular cervical cancer screening tests at age 21. Most people who have regular screenings with normal results can stop after age 65. Talk about this with your provider.  Breast cancer scan (mammogram): If you've ever had breasts, begin having regular mammograms starting at age 40. This is a scan to check for breast cancer.  Colon cancer screening: It is important to start screening for colon cancer at age 45.  Have a colonoscopy test every 10 years (or more often if you're at risk) Or, ask your provider about stool tests like a FIT test every year or Cologuard test every 3 years.  To learn more about your testing options, visit: https://www."[employer]"/904293.pdf.  For help making a decision, visit: https://bit.ly/xg62688.  Prostate cancer screening test: If you have a prostate and are age 55 to 69, ask your provider if you would benefit from a yearly prostate cancer screening test.  Lung cancer screening: If you are a current or former smoker age 50 to 80, ask your care team if ongoing lung cancer screenings are right for you.  For informational purposes only. Not to replace the advice of your health care provider. Copyright   2023 [employer]. All rights reserved. Clinically reviewed by the [facility] Transitions Program. [employer] 714078 - REV 01/24.    Preventive Care Advice   This is general advice given by our  system to help you stay healthy. However, your care team may have specific advice just for you. Please talk to your care team about your preventive care needs.  Nutrition  Eat 5 or more servings of fruits and vegetables each day.  Try wheat bread, brown rice and whole grain pasta (instead of white bread, rice, and pasta).  Get enough calcium and vitamin D. Check the label on foods and aim for 100% of the RDA (recommended daily allowance).  Lifestyle  Exercise at least 150 minutes each week   (30 minutes a day, 5 days a week).  Do muscle strengthening activities 2 days a week. These help control your weight and prevent disease.  No smoking.  Wear sunscreen to prevent skin cancer.  Have a dental exam and cleaning every 6 months.  Yearly exams  See your health care team every year to talk about:  Any changes in your health.  Any medicines your care team has prescribed.  Preventive care, family planning, and ways to prevent chronic diseases.  Shots (vaccines)   HPV shots (up to age 26), if you've never had them before.  Hepatitis B shots (up to age 59), if you've never had them before.  COVID-19 shot: Get this shot when it's due.  Flu shot: Get a flu shot every year.  Tetanus shot: Get a tetanus shot every 10 years.  Pneumococcal, hepatitis A, and RSV shots: Ask your care team if you need these based on your risk.  Shingles shot (for age 50 and up).  General health tests  Diabetes screening:  Starting at age 35, Get screened for diabetes at least every 3 years.  If you are younger than age 35, ask your care team if you should be screened for diabetes.  Cholesterol test: At age 39, start having a cholesterol test every 5 years, or more often if advised.  Bone density scan (DEXA): At age 50, ask your care team if you should have this scan for osteoporosis (brittle bones).  Hepatitis C: Get tested at least once in your life.  STIs (sexually transmitted infections)  Before age 24: Ask your care team if you should be  screened for STIs.  After age 24: Get screened for STIs if you're at risk. You are at risk for STIs (including HIV) if:  You are sexually active with more than one person.  You don't use condoms every time.  You or a partner was diagnosed with a sexually transmitted infection.  If you are at risk for HIV, ask about PrEP medicine to prevent HIV.  Get tested for HIV at least once in your life, whether you are at risk for HIV or not.  Cancer screening tests  Cervical cancer screening: If you have a cervix, begin getting regular cervical cancer screening tests at age 21. Most people who have regular screenings with normal results can stop after age 65. Talk about this with your provider.  Breast cancer scan (mammogram): If you've ever had breasts, begin having regular mammograms starting at age 40. This is a scan to check for breast cancer.  Colon cancer screening: It is important to start screening for colon cancer at age 45.  Have a colonoscopy test every 10 years (or more often if you're at risk) Or, ask your provider about stool tests like a FIT test every year or Cologuard test every 3 years.  To learn more about your testing options, visit: https://www.Domain[employer].com/407675.pdf.  For help making a decision, visit: https://bit.ly/hj75937.  Prostate cancer screening test: If you have a prostate and are age 55 to 69, ask your provider if you would benefit from a yearly prostate cancer screening test.  Lung cancer screening: If you are a current or former smoker age 50 to 80, ask your care team if ongoing lung cancer screenings are right for you.  For informational purposes only. Not to replace the advice of your health care provider. Copyright   2023 [employer]. All rights reserved. Clinically reviewed by the [facility] Transitions Program. [employer] 713451 - REV 01/24.    Learning About Stress  What is stress?     Stress is your body's response to a hard situation. Your body can have a physical,  emotional, or mental response. Stress is a fact of life for most people, and it affects everyone differently. What causes stress for you may not be stressful for someone else.  A lot of things can cause stress. You may feel stress when you go on a job interview, take a test, or run a race. This kind of short-term stress is normal and even useful. It can help you if you need to work hard or react quickly. For example, stress can help you finish an important job on time.  Long-term stress is caused by ongoing stressful situations or events. Examples of long-term stress include long-term health problems, ongoing problems at work, or conflicts in your family. Long-term stress can harm your health.  How does stress affect your health?  When you are stressed, your body responds as though you are in danger. It makes hormones that speed up your heart, make you breathe faster, and give you a burst of energy. This is called the fight-or-flight stress response. If the stress is over quickly, your body goes back to normal and no harm is done.  But if stress happens too often or lasts too long, it can have bad effects. Long-term stress can make you more likely to get sick, and it can make symptoms of some diseases worse. If you tense up when you are stressed, you may develop neck, shoulder, or low back pain. Stress is linked to high blood pressure and heart disease.  Stress also harms your emotional health. It can make you eden, tense, or depressed. Your relationships may suffer, and you may not do well at work or school.  What can you do to manage stress?  You can try these things to help manage stress:   Do something active. Exercise or activity can help reduce stress. Walking is a great way to get started. Even everyday activities such as housecleaning or yard work can help.  Try yoga or jourdan chi. These techniques combine exercise and meditation. You may need some training at first to learn them.  Do something you enjoy. For  "example, listen to music or go to a movie. Practice your hobby or do volunteer work.  Meditate. This can help you relax, because you are not worrying about what happened before or what may happen in the future.  Do guided imagery. Imagine yourself in any setting that helps you feel calm. You can use online videos, books, or a teacher to guide you.  Do breathing exercises. For example:  From a standing position, bend forward from the waist with your knees slightly bent. Let your arms dangle close to the floor.  Breathe in slowly and deeply as you return to a standing position. Roll up slowly and lift your head last.  Hold your breath for just a few seconds in the standing position.  Breathe out slowly and bend forward from the waist.  Let your feelings out. Talk, laugh, cry, and express anger when you need to. Talking with supportive friends or family, a counselor, or a ginette leader about your feelings is a healthy way to relieve stress. Avoid discussing your feelings with people who make you feel worse.  Write. It may help to write about things that are bothering you. This helps you find out how much stress you feel and what is causing it. When you know this, you can find better ways to cope.  What can you do to prevent stress?  You might try some of these things to help prevent stress:  Manage your time. This helps you find time to do the things you want and need to do.  Get enough sleep. Your body recovers from the stresses of the day while you are sleeping.  Get support. Your family, friends, and community can make a difference in how you experience stress.  Limit your news feed. Avoid or limit time on social media or news that may make you feel stressed.  Do something active. Exercise or activity can help reduce stress. Walking is a great way to get started.  Where can you learn more?  Go to https://www.healthwise.net/patiented  Enter N032 in the search box to learn more about \"Learning About Stress.\"  Current " as of: October 24, 2023               Content Version: 14.0    2092-7499 [employer].   Care instructions adapted under license by your healthcare professional. If you have questions about a medical condition or this instruction, always ask your healthcare professional. [employer] disclaims any warranty or liability for your use of this information.

## 2024-05-06 ENCOUNTER — OFFICE VISIT (OUTPATIENT)
Dept: FAMILY MEDICINE | Facility: OTHER | Age: 24
End: 2024-05-06
Attending: NURSE PRACTITIONER
Payer: COMMERCIAL

## 2024-05-06 VITALS
SYSTOLIC BLOOD PRESSURE: 100 MMHG | BODY MASS INDEX: 23.18 KG/M2 | OXYGEN SATURATION: 98 % | HEIGHT: 59 IN | WEIGHT: 115 LBS | TEMPERATURE: 97.8 F | HEART RATE: 70 BPM | DIASTOLIC BLOOD PRESSURE: 64 MMHG

## 2024-05-06 DIAGNOSIS — F41.9 ANXIETY: ICD-10-CM

## 2024-05-06 DIAGNOSIS — Z00.00 ROUTINE GENERAL MEDICAL EXAMINATION AT A HEALTH CARE FACILITY: Primary | ICD-10-CM

## 2024-05-06 PROCEDURE — 99395 PREV VISIT EST AGE 18-39: CPT | Performed by: NURSE PRACTITIONER

## 2024-05-06 SDOH — HEALTH STABILITY: PHYSICAL HEALTH: ON AVERAGE, HOW MANY MINUTES DO YOU ENGAGE IN EXERCISE AT THIS LEVEL?: 60 MIN

## 2024-05-06 SDOH — HEALTH STABILITY: PHYSICAL HEALTH: ON AVERAGE, HOW MANY DAYS PER WEEK DO YOU ENGAGE IN MODERATE TO STRENUOUS EXERCISE (LIKE A BRISK WALK)?: 3 DAYS

## 2024-05-06 ASSESSMENT — PAIN SCALES - GENERAL: PAINLEVEL: NO PAIN (0)

## 2024-05-06 ASSESSMENT — SOCIAL DETERMINANTS OF HEALTH (SDOH): HOW OFTEN DO YOU GET TOGETHER WITH FRIENDS OR RELATIVES?: TWICE A WEEK

## 2024-05-06 ASSESSMENT — ANXIETY QUESTIONNAIRES
4. TROUBLE RELAXING: NOT AT ALL
2. NOT BEING ABLE TO STOP OR CONTROL WORRYING: NOT AT ALL
GAD7 TOTAL SCORE: 3
6. BECOMING EASILY ANNOYED OR IRRITABLE: SEVERAL DAYS
IF YOU CHECKED OFF ANY PROBLEMS ON THIS QUESTIONNAIRE, HOW DIFFICULT HAVE THESE PROBLEMS MADE IT FOR YOU TO DO YOUR WORK, TAKE CARE OF THINGS AT HOME, OR GET ALONG WITH OTHER PEOPLE: SOMEWHAT DIFFICULT
3. WORRYING TOO MUCH ABOUT DIFFERENT THINGS: NOT AT ALL
GAD7 TOTAL SCORE: 3
8. IF YOU CHECKED OFF ANY PROBLEMS, HOW DIFFICULT HAVE THESE MADE IT FOR YOU TO DO YOUR WORK, TAKE CARE OF THINGS AT HOME, OR GET ALONG WITH OTHER PEOPLE?: SOMEWHAT DIFFICULT
GAD7 TOTAL SCORE: 3
7. FEELING AFRAID AS IF SOMETHING AWFUL MIGHT HAPPEN: SEVERAL DAYS
1. FEELING NERVOUS, ANXIOUS, OR ON EDGE: SEVERAL DAYS
7. FEELING AFRAID AS IF SOMETHING AWFUL MIGHT HAPPEN: SEVERAL DAYS
5. BEING SO RESTLESS THAT IT IS HARD TO SIT STILL: NOT AT ALL

## 2024-05-06 ASSESSMENT — PATIENT HEALTH QUESTIONNAIRE - PHQ9
10. IF YOU CHECKED OFF ANY PROBLEMS, HOW DIFFICULT HAVE THESE PROBLEMS MADE IT FOR YOU TO DO YOUR WORK, TAKE CARE OF THINGS AT HOME, OR GET ALONG WITH OTHER PEOPLE: NOT DIFFICULT AT ALL
SUM OF ALL RESPONSES TO PHQ QUESTIONS 1-9: 3
SUM OF ALL RESPONSES TO PHQ QUESTIONS 1-9: 3

## 2024-05-06 NOTE — PROGRESS NOTES
Answers submitted by the patient for this visit:  Patient Health Questionnaire (Submitted on 5/6/2024)  If you checked off any problems, how difficult have these problems made it for you to do your work, take care of things at home, or get along with other people?: Not difficult at all  PHQ9 TOTAL SCORE: 3  GLORIA-7 (Submitted on 5/6/2024)  GLORIA 7 TOTAL SCORE: 3  Preventive Care Visit  [facility]  RONEL Garcia CNP, Family Medicine  May 6, 2024      Assessment & Plan     Routine general medical examination at a health care facility  Normal exam continue yearly screening     Anxiety  Trinh does have some anxiety, but not as severe and limited panic attacks over the past year. She continues with Lexapro 10 mg daily - can consider increasing.  She would like to stop with giving her daughter breast milk before any increases.  She can schedule phone or in clinic visit in the future if she would like to increase dose     Patient has been advised of split billing requirements and indicates understanding: No  Reviewed previous labs       Counseling  Appropriate preventive services were discussed with this patient, including applicable screening as appropriate for fall prevention, nutrition, physical activity, Tobacco-use cessation, weight loss and cognition.  Checklist reviewing preventive services available has been given to the patient.  Reviewed patient's diet, addressing concerns and/or questions.   She is at risk for lack of exercise and has been provided with information to increase physical activity for the benefit of her well-being.   The patient was instructed to see the dentist every 6 months.   She is at risk for psychosocial distress and has been provided with information to reduce risk.       See Patient Instructions    Return in about 53 weeks (around 5/12/2025) for Annual Wellness Visit.    Subjective   Trinh is a 23 year old, presenting for the following:  Physical         No data to  display                 Health Care Directive  Patient does not have a Health Care Directive or Living Will: Discussed advance care planning with patient; however, patient declined at this time.    HPI    Depression and Anxiety   How are you doing with your depression since your last visit? No change  How are you doing with your anxiety since your last visit?  Improved   Are you having other symptoms that might be associated with depression or anxiety? No  Have you had a significant life event? No   Do you have any concerns with your use of alcohol or other drugs? No  Continues to have some anxiety - consider increasing in the future   Started on metformin for PCOS - regulated her menstrual cycle and she was able to get pregnant.      Social History     Tobacco Use    Smoking status: Never    Smokeless tobacco: Never   Vaping Use    Vaping status: Never Used   Substance Use Topics    Alcohol use: No    Drug use: No         7/20/2023     9:57 AM 8/17/2023     9:59 AM 5/6/2024     5:33 AM   PHQ   PHQ-9 Total Score 2 2 3   Q9: Thoughts of better off dead/self-harm past 2 weeks Not at all Not at all Not at all         7/20/2023     9:57 AM 8/17/2023     9:59 AM 5/6/2024     5:34 AM   GLORIA-7 SCORE   Total Score   3 (minimal anxiety)   Total Score 0 0 3         5/6/2024     5:33 AM   Last PHQ-9   1.  Little interest or pleasure in doing things 0   2.  Feeling down, depressed, or hopeless 0   3.  Trouble falling or staying asleep, or sleeping too much 0   4.  Feeling tired or having little energy 3   5.  Poor appetite or overeating 0   6.  Feeling bad about yourself 0   7.  Trouble concentrating 0   8.  Moving slowly or restless 0   Q9: Thoughts of better off dead/self-harm past 2 weeks 0   PHQ-9 Total Score 3         5/6/2024     5:34 AM   GLORIA-7    1. Feeling nervous, anxious, or on edge 1   2. Not being able to stop or control worrying 0   3. Worrying too much about different things 0   4. Trouble relaxing 0   5. Being so  restless that it is hard to sit still 0   6. Becoming easily annoyed or irritable 1   7. Feeling afraid, as if something awful might happen 1   GLORIA-7 Total Score 3   If you checked any problems, how difficult have they made it for you to do your work, take care of things at home, or get along with other people? Somewhat difficult       Suicide Assessment Five-step Evaluation and Treatment (SAFE-T)          5/6/2024   General Health   How would you rate your overall physical health? Good   Feel stress (tense, anxious, or unable to sleep) Only a little   (!) STRESS CONCERN      5/6/2024   Nutrition   Three or more servings of calcium each day? Yes   Diet: Regular (no restrictions)   How many servings of fruit and vegetables per day? (!) 0-1   How many sweetened beverages each day? 0-1         5/6/2024   Exercise   Days per week of moderate/strenous exercise 3 days   Average minutes spent exercising at this level 60 min         5/6/2024   Social Factors   Frequency of gathering with friends or relatives Twice a week   Worry food won't last until get money to buy more No   Food not last or not have enough money for food? No   Do you have housing?  Yes   Are you worried about losing your housing? No   Lack of transportation? No   Unable to get utilities (heat,electricity)? No         5/6/2024   Dental   Dentist two times every year? (!) NO         4/27/2024   TB Screening   Were you born outside of the [place]? No       Today's PHQ-9 Score:       5/6/2024     5:33 AM   PHQ-9 SCORE   PHQ-9 Total Score MyChart 3 (Minimal depression)   PHQ-9 Total Score 3         5/6/2024   Substance Use   Alcohol more than 3/day or more than 7/wk No   Do you use any other substances recreationally? No     Social History     Tobacco Use    Smoking status: Never    Smokeless tobacco: Never   Vaping Use    Vaping status: Never Used   Substance Use Topics    Alcohol use: No    Drug use: No           5/6/2024   STI Screening   New sexual partner(s)  since last STI/HIV test? No     History of abnormal Pap smear:         2023     9:05 AM 5/3/2021    11:43 AM   PAP / HPV   PAP Negative for Intraepithelial Lesion or Malignancy (NILM)     PAP (Historical)  NIL            2024   Contraception/Family Planning   Questions about contraception or family planning No        Reviewed and updated as needed this visit by Provider                    Labs reviewed in EPIC  BP Readings from Last 3 Encounters:   24 100/64   23 90/60   23 92/62    Wt Readings from Last 3 Encounters:   24 52.2 kg (115 lb)   23 49 kg (108 lb)   23 49 kg (108 lb)                  Patient Active Problem List   Diagnosis    Adjustment disorder with depressed mood    PTSD (post-traumatic stress disorder)    Anxiety    Astigmatism    PCOS (polycystic ovarian syndrome)     Past Surgical History:   Procedure Laterality Date     SECTION N/A 2023    Procedure:  SECTION girl at 0758;  Surgeon: Tai Hoang MD;  Location: [facility] OR    COMBINED CYSTOSCOPY, URETEROSCOPY, LASER HOLMIUM LITHOTRIPSY URETER(S) Left 2023    Procedure: Left ureteroscopy with laser lithotripsy and stent placement;  Surgeon: James Hanley MD;  Location: [facility] OR    COMBINED CYSTOSCOPY, URETEROSCOPY, LASER HOLMIUM LITHOTRIPSY URETER(S) Left 3/9/2023    Procedure: Left ureteroscopy with laser lithotripsy and stent placement;  Surgeon: James Hanley MD;  Location: [facility] OR    WISDOM TOOTH EXTRACTION Bilateral        Social History     Tobacco Use    Smoking status: Never    Smokeless tobacco: Never   Substance Use Topics    Alcohol use: No     Family History   Problem Relation Age of Onset    Mental Illness Mother     Mental Illness Father          Current Outpatient Medications   Medication Sig Dispense Refill    albuterol (PROAIR HFA/PROVENTIL HFA/VENTOLIN HFA) 108 (90 Base) MCG/ACT inhaler Inhale 2 puffs into the lungs every 4 hours as needed      escitalopram (LEXAPRO)  "10 MG tablet TAKE 1 TABLET(10 MG) BY MOUTH DAILY 90 tablet 0    ferrous gluconate (FERGON) 324 (38 Fe) MG tablet Take 1 tablet (324 mg) by mouth daily (with breakfast) 30 tablet 0    metFORMIN (GLUCOPHAGE) 500 MG tablet TAKE 1 TABLET(500 MG) BY MOUTH TWICE DAILY WITH MEALS 60 tablet 7    paragard intrauterine copper device 1 each by Intrauterine route once      Prenatal MV & Min w/FA-DHA (PRENATAL GUMMIES) 0.18-25 MG CHEW        Allergies   Allergen Reactions    Apple Juice      Gums/mouth gets itchy  Regular apples  Ok to have apple juice-just allergic to \"raw apples\" per patient    Lazar      Gums/mouth gets itchy    Pistachios [Nuts]          Review of Systems  CONSTITUTIONAL: NEGATIVE for fever, chills, change in weight  INTEGUMENTARY/SKIN: NEGATIVE for worrisome rashes, moles or lesions  EYES: change in vision and light sensitive   ENT/MOUTH: NEGATIVE for ear, mouth and throat problems  RESP: NEGATIVE for significant cough or SOB  CV: NEGATIVE for chest pain, palpitations or peripheral edema  GI: NEGATIVE for nausea, abdominal pain, heartburn, or change in bowel habits  : denies dysuria - menstrual cycle has not returned to regular, but she does have some   MUSCULOSKELETAL: NEGATIVE for significant arthralgias or myalgia  NEURO: light sensitive migraines   ENDOCRINE: NEGATIVE for temperature intolerance, skin/hair changes  PSYCHIATRIC: HX anxiety     Objective    Exam  /64 (BP Location: Right arm, Patient Position: Sitting, Cuff Size: Adult Regular)   Pulse 70   Temp 97.8  F (36.6  C) (Tympanic)   Ht 1.499 m (4' 11\")   Wt 52.2 kg (115 lb)   LMP 04/29/2024   SpO2 98%   BMI 23.23 kg/m     Estimated body mass index is 23.23 kg/m  as calculated from the following:    Height as of this encounter: 1.499 m (4' 11\").    Weight as of this encounter: 52.2 kg (115 lb).    Physical Exam  GENERAL: alert and no distress  HENT: ear canals and TM's normal, nose and mouth without ulcers or lesions  NECK: no " adenopathy, no asymmetry, masses, or scars  RESP: lungs clear to auscultation - no rales, rhonchi or wheezes  CV: regular rate and rhythm, normal S1 S2, no S3 or S4, no murmur, click or rub, no peripheral edema  ABDOMEN: soft, nontender, no hepatosplenomegaly, no masses and bowel sounds normal  MS: no gross musculoskeletal defects noted, no edema  SKIN: no suspicious lesions or rashes  NEURO: Normal strength and tone, mentation intact and speech normal  PSYCH: mentation appears normal, affect normal/bright  LYMPH: normal ant/post cervical, supraclavicular nodes        Signed Electronically by: RONEL Garcia CNP

## 2024-08-12 ENCOUNTER — MYC REFILL (OUTPATIENT)
Dept: FAMILY MEDICINE | Facility: OTHER | Age: 24
End: 2024-08-12

## 2024-08-12 DIAGNOSIS — F41.9 ANXIETY: ICD-10-CM

## 2024-08-13 ENCOUNTER — OFFICE VISIT (OUTPATIENT)
Dept: FAMILY MEDICINE | Facility: OTHER | Age: 24
End: 2024-08-13
Attending: NURSE PRACTITIONER
Payer: COMMERCIAL

## 2024-08-13 VITALS
OXYGEN SATURATION: 100 % | HEART RATE: 95 BPM | HEIGHT: 59 IN | TEMPERATURE: 97.5 F | WEIGHT: 123.9 LBS | SYSTOLIC BLOOD PRESSURE: 100 MMHG | DIASTOLIC BLOOD PRESSURE: 70 MMHG | BODY MASS INDEX: 24.98 KG/M2

## 2024-08-13 DIAGNOSIS — R00.2 PALPITATIONS: ICD-10-CM

## 2024-08-13 DIAGNOSIS — Z86.2 HISTORY OF ANEMIA: ICD-10-CM

## 2024-08-13 DIAGNOSIS — E28.2 PCOS (POLYCYSTIC OVARIAN SYNDROME): Primary | ICD-10-CM

## 2024-08-13 DIAGNOSIS — R41.3 MEMORY PROBLEM: ICD-10-CM

## 2024-08-13 LAB
ALBUMIN SERPL BCG-MCNC: 4.6 G/DL (ref 3.5–5.2)
ALP SERPL-CCNC: 95 U/L (ref 40–150)
ALT SERPL W P-5'-P-CCNC: 13 U/L (ref 0–50)
ANION GAP SERPL CALCULATED.3IONS-SCNC: 12 MMOL/L (ref 7–15)
AST SERPL W P-5'-P-CCNC: 20 U/L (ref 0–45)
BASOPHILS # BLD AUTO: 0 10E3/UL (ref 0–0.2)
BASOPHILS NFR BLD AUTO: 1 %
BILIRUB SERPL-MCNC: 0.5 MG/DL
BUN SERPL-MCNC: 11.8 MG/DL (ref 6–20)
CALCIUM SERPL-MCNC: 9.8 MG/DL (ref 8.8–10.4)
CHLORIDE SERPL-SCNC: 105 MMOL/L (ref 98–107)
CREAT SERPL-MCNC: 0.64 MG/DL (ref 0.51–0.95)
EGFRCR SERPLBLD CKD-EPI 2021: >90 ML/MIN/1.73M2
EOSINOPHIL # BLD AUTO: 0.2 10E3/UL (ref 0–0.7)
EOSINOPHIL NFR BLD AUTO: 3 %
ERYTHROCYTE [DISTWIDTH] IN BLOOD BY AUTOMATED COUNT: 12.9 % (ref 10–15)
FERRITIN SERPL-MCNC: 8 NG/ML (ref 6–175)
GLUCOSE SERPL-MCNC: 90 MG/DL (ref 70–99)
HCO3 SERPL-SCNC: 24 MMOL/L (ref 22–29)
HCT VFR BLD AUTO: 39.7 % (ref 35–47)
HGB BLD-MCNC: 12.6 G/DL (ref 11.7–15.7)
IMM GRANULOCYTES # BLD: 0 10E3/UL
IMM GRANULOCYTES NFR BLD: 0 %
IRON BINDING CAPACITY (ROCHE): 430 UG/DL (ref 240–430)
IRON SATN MFR SERPL: 7 % (ref 15–46)
IRON SERPL-MCNC: 30 UG/DL (ref 37–145)
LYMPHOCYTES # BLD AUTO: 2 10E3/UL (ref 0.8–5.3)
LYMPHOCYTES NFR BLD AUTO: 33 %
MCH RBC QN AUTO: 28.2 PG (ref 26.5–33)
MCHC RBC AUTO-ENTMCNC: 31.7 G/DL (ref 31.5–36.5)
MCV RBC AUTO: 89 FL (ref 78–100)
MONOCYTES # BLD AUTO: 0.4 10E3/UL (ref 0–1.3)
MONOCYTES NFR BLD AUTO: 6 %
NEUTROPHILS # BLD AUTO: 3.6 10E3/UL (ref 1.6–8.3)
NEUTROPHILS NFR BLD AUTO: 58 %
NRBC # BLD AUTO: 0 10E3/UL
NRBC BLD AUTO-RTO: 0 /100
PLATELET # BLD AUTO: 339 10E3/UL (ref 150–450)
POTASSIUM SERPL-SCNC: 3.9 MMOL/L (ref 3.4–5.3)
PROT SERPL-MCNC: 7.6 G/DL (ref 6.4–8.3)
RBC # BLD AUTO: 4.47 10E6/UL (ref 3.8–5.2)
SODIUM SERPL-SCNC: 141 MMOL/L (ref 135–145)
TSH SERPL DL<=0.005 MIU/L-ACNC: 1.69 UIU/ML (ref 0.3–4.2)
WBC # BLD AUTO: 6.2 10E3/UL (ref 4–11)

## 2024-08-13 PROCEDURE — 36415 COLL VENOUS BLD VENIPUNCTURE: CPT | Performed by: NURSE PRACTITIONER

## 2024-08-13 PROCEDURE — 82728 ASSAY OF FERRITIN: CPT | Performed by: NURSE PRACTITIONER

## 2024-08-13 PROCEDURE — G2211 COMPLEX E/M VISIT ADD ON: HCPCS | Performed by: NURSE PRACTITIONER

## 2024-08-13 PROCEDURE — 99213 OFFICE O/P EST LOW 20 MIN: CPT | Performed by: NURSE PRACTITIONER

## 2024-08-13 PROCEDURE — 80050 GENERAL HEALTH PANEL: CPT | Performed by: NURSE PRACTITIONER

## 2024-08-13 PROCEDURE — 83540 ASSAY OF IRON: CPT | Performed by: NURSE PRACTITIONER

## 2024-08-13 PROCEDURE — 82607 VITAMIN B-12: CPT | Performed by: NURSE PRACTITIONER

## 2024-08-13 PROCEDURE — 83550 IRON BINDING TEST: CPT | Performed by: NURSE PRACTITIONER

## 2024-08-13 RX ORDER — ESCITALOPRAM OXALATE 10 MG/1
10 TABLET ORAL DAILY
Qty: 90 TABLET | Refills: 1 | Status: SHIPPED | OUTPATIENT
Start: 2024-08-13

## 2024-08-13 ASSESSMENT — PAIN SCALES - GENERAL: PAINLEVEL: NO PAIN (0)

## 2024-08-13 NOTE — PATIENT INSTRUCTIONS
Will notify you of your results once available     Keep appointment with derm     Follow up as needed

## 2024-08-13 NOTE — TELEPHONE ENCOUNTER
Escitalopram (Lexapro) 10 MG tablet    Last Written Prescription Date:  04/17/2024  Last Fill Quantity: 90,   # refills: 0  Last Office Visit: 05/06/2024

## 2024-08-13 NOTE — PROGRESS NOTES
Assessment & Plan     PCOS (polycystic ovarian syndrome)  Concerns for some B12 deficiencies due to numbness in arms and legs.  She was noted to have low iron again.  Plan to restart iron supplement either every other day or 3 days a week and repeat labs in 3 months   - Vitamin B12; Future  - TSH with free T4 reflex; Future  - CBC with platelets and differential; Future  - Ferritin; Future  - Iron and iron binding capacity; Future  - Comprehensive metabolic panel (BMP + Alb, Alk Phos, ALT, AST, Total. Bili, TP); Future  - Comprehensive metabolic panel (BMP + Alb, Alk Phos, ALT, AST, Total. Bili, TP)  - Iron and iron binding capacity  - Ferritin  - CBC with platelets and differential  - TSH with free T4 reflex  - Vitamin B12    Memory problem  Waiting on B12 level  Continue to work on anxiety   Plan to restart iron supplement as above   - Vitamin B12; Future  - TSH with free T4 reflex; Future  - CBC with platelets and differential; Future  - Ferritin; Future  - Iron and iron binding capacity; Future  - Iron and iron binding capacity  - Ferritin  - CBC with platelets and differential  - TSH with free T4 reflex  - Vitamin B12    History of anemia  See above  - Vitamin B12; Future  - TSH with free T4 reflex; Future  - CBC with platelets and differential; Future  - Ferritin; Future  - Iron and iron binding capacity; Future  - Iron and iron binding capacity  - Ferritin  - CBC with platelets and differential  - TSH with free T4 reflex  - Vitamin B12    Palpitations  History of abnormal chest feeling or palpitations.  Plan to check ziopatch.  Possible related to slightly low iron vs anxiety.  Heart rate is normal, but would like to make sure not missing some abnormal rhythm   May want to consider ECHO   - ZIO PATCH 3-7 DAYS (additional cost to patient); Future  - ZIO PATCH 3-7 DAYS APPLICATION; Future    The longitudinal plan of care for the diagnosis(es)/condition(s) as documented were addressed during this visit. Due to  "the added complexity in care, I will continue to support Trinh in the subsequent management and with ongoing continuity of care.    Ordering of each unique test        BMI  Estimated body mass index is 25.02 kg/m  as calculated from the following:    Height as of this encounter: 1.499 m (4' 11\").    Weight as of this encounter: 56.2 kg (123 lb 14.4 oz).         See Patient Instructions    No follow-ups on file.    Subjective   Trinh is a 23 year old, presenting for the following health issues:  Patient is requesting all vitamin levels and thyroid levels checked        8/13/2024     2:30 PM   Additional Questions   Roomed by balta padilla   Accompanied by self         8/13/2024     2:30 PM   Patient Reported Additional Medications   Patient reports taking the following new medications none     History of Present Illness       Reason for visit:  Possible b12/low iron deficiency  Symptom onset:  3-4 weeks ago  Symptoms include:  Memory problems, tingly chest and legs, craving ice etc  Symptom intensity:  Mild  Symptom progression:  Staying the same  Had these symptoms before:  Yes  Has tried/received treatment for these symptoms:  No    She eats 0-1 servings of fruits and vegetables daily.She consumes 0 sweetened beverage(s) daily.She exercises with enough effort to increase her heart rate 20 to 29 minutes per day.  She exercises with enough effort to increase her heart rate 3 or less days per week. She is missing 1 dose(s) of medications per week.  She is not taking prescribed medications regularly due to remembering to take.       Patient is requesting all vitamin levels and thyroid levels checked  Feels like she is having memory problems  Light sensitive  Craving ice -          Migraine   Since your last clinic visit, how have your headaches changed?  No change  How often are you getting headaches or migraines? Every other day    Are you able to do normal daily activities when you have a migraine? Yes,  Are you " "taking rescue/relief medications? (Select all that apply) ibuprofen (Advil, Motrin) and Tylenol  How helpful is your rescue/relief medication?  I get some relief  Are you taking any medications to prevent migraines? (Select all that apply)  No  In the past 4 weeks, how often have you gone to urgent care or the emergency room because of your headaches?  0        Review of Systems  CONSTITUTIONAL: NEGATIVE for fever, chills, change in weight  INTEGUMENTARY/SKIN: multiple moles   RESP: NEGATIVE for significant cough or SOB  CV: NEGATIVE for chest pain, palpitations or peripheral edema  NEURO: feels like she is having some numbness and memory problems       Objective    /70 (BP Location: Right arm, Patient Position: Sitting, Cuff Size: Adult Regular)   Pulse 95   Temp 97.5  F (36.4  C) (Tympanic)   Ht 1.499 m (4' 11\")   Wt 56.2 kg (123 lb 14.4 oz)   LMP 08/01/2024   SpO2 100%   BMI 25.02 kg/m    Body mass index is 25.02 kg/m .  Physical Exam   GENERAL: alert and no distress  NECK: no adenopathy, no asymmetry, masses, or scars  RESP: lungs clear to auscultation - no rales, rhonchi or wheezes  CV: regular rate and rhythm, normal S1 S2, no S3 or S4, no murmur, click or rub, no peripheral edema  ABDOMEN: soft, nontender, no hepatosplenomegaly, no masses and bowel sounds normal  PSYCH: mentation appears normal, anxious    Results for orders placed or performed in visit on 08/13/24   Comprehensive metabolic panel (BMP + Alb, Alk Phos, ALT, AST, Total. Bili, TP)     Status: Normal   Result Value Ref Range    Sodium 141 135 - 145 mmol/L    Potassium 3.9 3.4 - 5.3 mmol/L    Carbon Dioxide (CO2) 24 22 - 29 mmol/L    Anion Gap 12 7 - 15 mmol/L    Urea Nitrogen 11.8 6.0 - 20.0 mg/dL    Creatinine 0.64 0.51 - 0.95 mg/dL    GFR Estimate >90 >60 mL/min/1.73m2    Calcium 9.8 8.8 - 10.4 mg/dL    Chloride 105 98 - 107 mmol/L    Glucose 90 70 - 99 mg/dL    Alkaline Phosphatase 95 40 - 150 U/L    AST 20 0 - 45 U/L    ALT 13 0 " - 50 U/L    Protein Total 7.6 6.4 - 8.3 g/dL    Albumin 4.6 3.5 - 5.2 g/dL    Bilirubin Total 0.5 <=1.2 mg/dL   Iron and iron binding capacity     Status: Abnormal   Result Value Ref Range    Iron 30 (L) 37 - 145 ug/dL    Iron Binding Capacity 430 240 - 430 ug/dL    Iron Sat Index 7 (L) 15 - 46 %   Ferritin     Status: Normal   Result Value Ref Range    Ferritin 8 6 - 175 ng/mL   TSH with free T4 reflex     Status: Normal   Result Value Ref Range    TSH 1.69 0.30 - 4.20 uIU/mL   CBC with platelets and differential     Status: None   Result Value Ref Range    WBC Count 6.2 4.0 - 11.0 10e3/uL    RBC Count 4.47 3.80 - 5.20 10e6/uL    Hemoglobin 12.6 11.7 - 15.7 g/dL    Hematocrit 39.7 35.0 - 47.0 %    MCV 89 78 - 100 fL    MCH 28.2 26.5 - 33.0 pg    MCHC 31.7 31.5 - 36.5 g/dL    RDW 12.9 10.0 - 15.0 %    Platelet Count 339 150 - 450 10e3/uL    % Neutrophils 58 %    % Lymphocytes 33 %    % Monocytes 6 %    % Eosinophils 3 %    % Basophils 1 %    % Immature Granulocytes 0 %    NRBCs per 100 WBC 0 <1 /100    Absolute Neutrophils 3.6 1.6 - 8.3 10e3/uL    Absolute Lymphocytes 2.0 0.8 - 5.3 10e3/uL    Absolute Monocytes 0.4 0.0 - 1.3 10e3/uL    Absolute Eosinophils 0.2 0.0 - 0.7 10e3/uL    Absolute Basophils 0.0 0.0 - 0.2 10e3/uL    Absolute Immature Granulocytes 0.0 <=0.4 10e3/uL    Absolute NRBCs 0.0 10e3/uL   CBC with platelets and differential     Status: None    Narrative    The following orders were created for panel order CBC with platelets and differential.  Procedure                               Abnormality         Status                     ---------                               -----------         ------                     CBC with platelets and d...[[id number]]                      Final result                 Please view results for these tests on the individual orders.           Signed Electronically by: RONEL Garcia CNP

## 2024-08-14 LAB — VIT B12 SERPL-MCNC: 766 PG/ML (ref 232–1245)

## 2024-08-15 ENCOUNTER — ALLIED HEALTH/NURSE VISIT (OUTPATIENT)
Dept: FAMILY MEDICINE | Facility: OTHER | Age: 24
End: 2024-08-15
Attending: NURSE PRACTITIONER
Payer: COMMERCIAL

## 2024-08-15 ENCOUNTER — MYC MEDICAL ADVICE (OUTPATIENT)
Dept: FAMILY MEDICINE | Facility: OTHER | Age: 24
End: 2024-08-15

## 2024-08-15 DIAGNOSIS — D50.9 IRON DEFICIENCY ANEMIA, UNSPECIFIED IRON DEFICIENCY ANEMIA TYPE: Primary | ICD-10-CM

## 2024-08-15 DIAGNOSIS — R00.2 PALPITATIONS: ICD-10-CM

## 2024-08-15 PROCEDURE — 93242 EXT ECG>48HR<7D RECORDING: CPT

## 2024-08-15 RX ORDER — FERROUS GLUCONATE 324(38)MG
324 TABLET ORAL
Qty: 90 TABLET | Refills: 1 | Status: SHIPPED | OUTPATIENT
Start: 2024-08-15

## 2024-08-15 NOTE — PROGRESS NOTES
Trinh Gonzales arrived here on 8/15/2024 1:21 PM for 3-7 Days  Zio monitor placement per ordering provider Brandi Nolan for the diagnosis Palpitations.  Patient s skin was prepped per protocol. Dr. Nam is the supervising MD.  Zio monitor was placed.  Instructions were reviewed with and given to the patient.  Patient verbalized understanding of wear, troubleshooting and monitor return instructions.

## 2024-08-15 NOTE — TELEPHONE ENCOUNTER
ferrous gluconate (FERGON) 324 (38 Fe) MG tablet (Discontinued)       Last Written Prescription Date:  7/9/23-7/10/23  Last Fill Quantity: 324 mg,   # refills:   Last Office Visit: 8/13/24  Future Office visit:    Next 5 appointments (look out 90 days)      Aug 15, 2024 1:15 PM  MA Visit with  JANELLE  [facility] [place] ([facility] [place] ) [address]  [place] [address]  [phone]             Routing refill request to provider for review/approval because:

## 2024-08-28 PROCEDURE — 93244 EXT ECG>48HR<7D REV&INTERPJ: CPT | Performed by: INTERNAL MEDICINE

## 2024-09-01 ENCOUNTER — MYC MEDICAL ADVICE (OUTPATIENT)
Dept: FAMILY MEDICINE | Facility: OTHER | Age: 24
End: 2024-09-01

## 2024-09-01 DIAGNOSIS — Z30.015 ENCOUNTER FOR INITIAL PRESCRIPTION OF VAGINAL RING HORMONAL CONTRACEPTIVE: Primary | ICD-10-CM

## 2024-09-03 DIAGNOSIS — Z30.015 ENCOUNTER FOR INITIAL PRESCRIPTION OF VAGINAL RING HORMONAL CONTRACEPTIVE: Primary | ICD-10-CM

## 2024-09-03 RX ORDER — ETONOGESTREL AND ETHINYL ESTRADIOL VAGINAL RING .015; .12 MG/D; MG/D
RING VAGINAL
Qty: 3 EACH | Refills: 1 | Status: SHIPPED | OUTPATIENT
Start: 2024-09-03

## 2024-09-04 RX ORDER — ETONOGESTREL AND ETHINYL ESTRADIOL .12; .015 MG/D; MG/D
RING VAGINAL
Qty: 3 EACH | Refills: 1 | Status: SHIPPED | OUTPATIENT
Start: 2024-09-04

## 2024-09-04 NOTE — TELEPHONE ENCOUNTER
Nuvaring is not covered.  Preferred is Eluryng.  This is pended.  Please review and sign if appropriate.

## 2024-09-18 DIAGNOSIS — E28.2 PCOS (POLYCYSTIC OVARIAN SYNDROME): ICD-10-CM

## 2024-09-18 NOTE — TELEPHONE ENCOUNTER
Metformin       Last Written Prescription Date:  12/5/2023  Last Fill Quantity: 60,   # refills: 7  Last Office Visit: 8/13/2024  Future Office visit:

## 2024-09-23 DIAGNOSIS — E28.2 PCOS (POLYCYSTIC OVARIAN SYNDROME): ICD-10-CM

## 2024-09-23 NOTE — TELEPHONE ENCOUNTER
Patient requests a 90 day supply    Metformin      Last Written Prescription Date:  9/23/24  Last Fill Quantity: 60,   # refills: 0  Last Office Visit: 8/13/24  Future Office visit:       Routing refill request to provider for review/approval because:

## 2024-09-25 ENCOUNTER — MYC MEDICAL ADVICE (OUTPATIENT)
Dept: FAMILY MEDICINE | Facility: OTHER | Age: 24
End: 2024-09-25

## 2024-10-15 ENCOUNTER — OFFICE VISIT (OUTPATIENT)
Dept: FAMILY MEDICINE | Facility: OTHER | Age: 24
End: 2024-10-15
Attending: NURSE PRACTITIONER
Payer: COMMERCIAL

## 2024-10-15 VITALS
DIASTOLIC BLOOD PRESSURE: 58 MMHG | SYSTOLIC BLOOD PRESSURE: 100 MMHG | RESPIRATION RATE: 16 BRPM | WEIGHT: 122.6 LBS | HEART RATE: 87 BPM | OXYGEN SATURATION: 99 % | BODY MASS INDEX: 24.76 KG/M2 | TEMPERATURE: 98.8 F

## 2024-10-15 DIAGNOSIS — R10.13 EPIGASTRIC PAIN: ICD-10-CM

## 2024-10-15 DIAGNOSIS — K21.00 GASTROESOPHAGEAL REFLUX DISEASE WITH ESOPHAGITIS WITHOUT HEMORRHAGE: Primary | ICD-10-CM

## 2024-10-15 PROCEDURE — 99213 OFFICE O/P EST LOW 20 MIN: CPT | Performed by: NURSE PRACTITIONER

## 2024-10-15 RX ORDER — SCOLOPAMINE TRANSDERMAL SYSTEM 1 MG/1
PATCH, EXTENDED RELEASE TRANSDERMAL
COMMUNITY
Start: 2024-08-20

## 2024-10-15 ASSESSMENT — PAIN SCALES - GENERAL: PAINLEVEL: NO PAIN (0)

## 2024-10-15 NOTE — PATIENT INSTRUCTIONS
Take Prilosec 20 mg daily for 2-4 weeks and ok to use pepcid up to 2 times daily for breakthrough heartburn     If you do Prilosec for 4 weeks we will discuss weaning or referral for EGD     Try an antiinflammatory diet for 2 weeks      LIFESTYLE CHANGES  Mediterranean style eating/plant based diet     Increase fiber intake 20-25  Protein goal (weight/2.2)  X  0.8-1.2     Eat a Healthy diet  Eat more vegetables/plants in your diet (1/2 your food should be vegetables)  Eat health fats  [place] oil  avocados  Eat healthy proteins  Poultry without the skin  Fish  Limit red meat  Nuts - limit to 1/4 cup per day   Increase physical activity  Slowly work up to 30 minutes of physical activity most days of the week  Aerobic activity 150 minute a week  2 days of resistance exercising   Move every 30-60 minutes         Try daily yoga and meditation and relaxation breathing

## 2024-10-15 NOTE — PROGRESS NOTES
"  Assessment & Plan     Gastroesophageal reflux disease with esophagitis without hemorrhage  Epigastric pain  DDX: gastritis, GERD, gallbladder disease  Discussed medication trial of PPI and back up with [id number]  If no improvement consider referral to hida scan or referral for EGD  Will wait for results of US  - US Abdomen Limited; Future    We had a long discussion about trying diet changes for symptomatic management       BMI  Estimated body mass index is 24.76 kg/m  as calculated from the following:    Height as of 8/13/24: 1.499 m (4' 11\").    Weight as of this encounter: 55.6 kg (122 lb 9.6 oz).     Ordering of each unique test  I spent a total of 25 minutes on the day of the visit.   Time spent by me doing chart review, history and exam, documentation and further activities per the note    See Patient Instructions    No follow-ups on file.    Francis Tsang is a 24 year old, presenting for the following health issues:  Gastrointestinal Problem        10/15/2024     1:15 PM   Additional Questions   Accompanied by daughter     History of Present Illness       Reason for visit:  Acid reflux and heartburn  Symptom onset:  More than a month  Symptom intensity:  Moderate  Symptom progression:  Staying the same  Had these symptoms before:  Yes  Has tried/received treatment for these symptoms:  No  What makes it worse:  No  What makes it better:  Antacids sometimes   She is taking medications regularly.       GERD/Heartburn  Onset/Duration: beginning of September  Description: every day heartburn and currently taking Pepcid and is not helping with symptoms  Intensity: moderate  Progression of Symptoms: same  Accompanying Signs & Symptoms:  Does it feel like food gets stuck or trouble swallowing: No  Nausea: No  Vomiting (bloody?): No  Abdominal Pain: No  Black-Tarry stools: No  Bloody stools: No  History:  Previous similar episodes: YES  Previous ulcers: No  Precipitating factors:   Caffeine use: No  Alcohol " use: No  NSAID/Aspirin use: YES- not daily   Tobacco use: No  Worse with no particular food or drink.  Alleviating factors: pepcid takes a long time to work but doesn't last   Therapies tried and outcome:             Lifestyle changes: has tried modifying diet and nothing has helped            Medications: Pepcid (famotidine)      Review of Systems  CONSTITUTIONAL: NEGATIVE for fever, chills, change in weight  INTEGUMENTARY/SKIN: NEGATIVE for worrisome rashes, moles or lesions  ENT/MOUTH: feels acid in back of throat   RESP: NEGATIVE for significant cough or SOB  CV: NEGATIVE for chest pain, palpitations or peripheral edema  GI: gas or bloating and heartburn or reflux  : normal menstrual cycles and denies dysuria       Objective    /58 (BP Location: Left arm, Patient Position: Sitting, Cuff Size: Adult Regular)   Pulse 87   Temp 98.8  F (37.1  C) (Tympanic)   Resp 16   Wt 55.6 kg (122 lb 9.6 oz)   LMP 09/29/2024   SpO2 99%   Breastfeeding No   BMI 24.76 kg/m    Body mass index is 24.76 kg/m .  Physical Exam   GENERAL: alert and no distress  HENT: ear canals and TM's normal, nose and mouth without ulcers or lesions  NECK: no adenopathy, no asymmetry, masses, or scars  RESP: lungs clear to auscultation - no rales, rhonchi or wheezes  CV: regular rate and rhythm, normal S1 S2, no S3 or S4, no murmur, click or rub, no peripheral edema  ABDOMEN: tenderness epigastric and bowel sounds normal  PSYCH: mentation appears normal, affect normal/bright    Reviewed labs in EPIC         Signed Electronically by: RONEL Garcia CNP

## 2024-10-18 ENCOUNTER — HOSPITAL ENCOUNTER (OUTPATIENT)
Dept: ULTRASOUND IMAGING | Facility: HOSPITAL | Age: 24
Discharge: HOME | End: 2024-10-18
Attending: NURSE PRACTITIONER | Admitting: NURSE PRACTITIONER
Payer: COMMERCIAL

## 2024-10-18 DIAGNOSIS — K21.00 GASTROESOPHAGEAL REFLUX DISEASE WITH ESOPHAGITIS WITHOUT HEMORRHAGE: ICD-10-CM

## 2024-10-18 DIAGNOSIS — R10.13 EPIGASTRIC PAIN: ICD-10-CM

## 2024-10-18 PROCEDURE — 76705 ECHO EXAM OF ABDOMEN: CPT

## 2024-10-26 DIAGNOSIS — E28.2 PCOS (POLYCYSTIC OVARIAN SYNDROME): ICD-10-CM

## 2024-10-28 NOTE — TELEPHONE ENCOUNTER
Metformin 500 MG      Last Written Prescription Date:  09/23/24  Last Fill Quantity: 60,   # refills: 0  Last Office Visit: 10/15/24  Future Office visit:    Next 5 appointments (look out 90 days)      Nov 18, 2024 3:00 PM  (Arrive by 2:45 PM)  Provider Visit with RONEL Savage CNP  [facility] ([facility] [place] ) [address]  [place] [address]  [phone]             Routing refill request to provider for review/approval because:    Biguanide Agents Wjffen91/26/2024 03:14 AM   Protocol Details Recent (12 mo) or future (30 days) visit within the authorizing provider's specialty

## 2024-11-13 ENCOUNTER — MYC MEDICAL ADVICE (OUTPATIENT)
Dept: FAMILY MEDICINE | Facility: OTHER | Age: 24
End: 2024-11-13

## 2024-11-15 NOTE — ANESTHESIA CARE TRANSFER NOTE
Patient: Trinh Gonzales    Procedure: Procedure(s):  Left ureteroscopy with laser lithotripsy and stent placement       Diagnosis: Left ureteral stone [N20.1]  Diagnosis Additional Information: No value filed.    Anesthesia Type:   General     Note:    Oropharynx: oropharynx clear of all foreign objects and spontaneously breathing  Level of Consciousness: drowsy  Oxygen Supplementation: face mask  Level of Supplemental Oxygen (L/min / FiO2): 6  Independent Airway: airway patency satisfactory and stable  Dentition: dentition unchanged  Vital Signs Stable: post-procedure vital signs reviewed and stable  Report to RN Given: handoff report given  Patient transferred to: PACU    Handoff Report: Identifed the Patient, Identified the Reponsible Provider, Reviewed the pertinent medical history, Discussed the surgical course, Reviewed Intra-OP anesthesia mangement and issues during anesthesia, Set expectations for post-procedure period and Allowed opportunity for questions and acknowledgement of understanding      Vitals:  Vitals Value Taken Time   BP 91/60 03/09/23 1254   Temp     Pulse 74 03/09/23 1257   Resp 20 03/09/23 1257   SpO2 100 % 03/09/23 1257   Vitals shown include unvalidated device data.    Electronically Signed By: RONEL Nicole CRNA  March 9, 2023  12:58 PM  
fair balance

## 2024-11-18 ENCOUNTER — OFFICE VISIT (OUTPATIENT)
Dept: FAMILY MEDICINE | Facility: OTHER | Age: 24
End: 2024-11-18
Attending: NURSE PRACTITIONER
Payer: COMMERCIAL

## 2024-11-18 VITALS
SYSTOLIC BLOOD PRESSURE: 106 MMHG | RESPIRATION RATE: 16 BRPM | WEIGHT: 122.8 LBS | TEMPERATURE: 98.4 F | HEIGHT: 59 IN | DIASTOLIC BLOOD PRESSURE: 74 MMHG | OXYGEN SATURATION: 99 % | BODY MASS INDEX: 24.76 KG/M2 | HEART RATE: 94 BPM

## 2024-11-18 DIAGNOSIS — Z30.015 ENCOUNTER FOR INITIAL PRESCRIPTION OF VAGINAL RING HORMONAL CONTRACEPTIVE: ICD-10-CM

## 2024-11-18 DIAGNOSIS — K21.00 GASTROESOPHAGEAL REFLUX DISEASE WITH ESOPHAGITIS WITHOUT HEMORRHAGE: Primary | ICD-10-CM

## 2024-11-18 RX ORDER — ETONOGESTREL AND ETHINYL ESTRADIOL VAGINAL RING .015; .12 MG/D; MG/D
RING VAGINAL
Qty: 9 EACH | Refills: 3 | Status: SHIPPED | OUTPATIENT
Start: 2024-11-18

## 2024-11-18 ASSESSMENT — PAIN SCALES - GENERAL: PAINLEVEL_OUTOF10: NO PAIN (0)

## 2024-11-18 NOTE — PATIENT INSTRUCTIONS
Try pepcid before eating spice or high fat foods  Can also use pepcid if develop heart burn    Try eating small frequent meals and limit liquid intake while     Try not eating 3-4 hours before bed

## 2024-11-20 LAB
TTG IGA SER-ACNC: 0.6 U/ML
TTG IGG SER-ACNC: 0.7 U/ML

## 2024-11-21 LAB
APPLE IGE QN: 5.13 KU(A)/L
CORN IGE QN: <0.1 KU(A)/L
COW MILK IGE QN: 0.16 KU(A)/L
EGG WHITE IGE QN: <0.1 KU(A)/L
OAT IGE QN: 0.11 KU(A)/L
ORANGE IGE QN: <0.1 KU(A)/L
PEANUT IGE QN: 1.82 KU(A)/L
SOYBEAN IGE QN: <0.1 KU(A)/L
TOMATO IGE QN: <0.1 KU(A)/L
WHEAT IGE QN: <0.1 KU(A)/L

## 2024-11-25 ENCOUNTER — MYC REFILL (OUTPATIENT)
Dept: FAMILY MEDICINE | Facility: OTHER | Age: 24
End: 2024-11-25

## 2024-11-25 DIAGNOSIS — E28.2 PCOS (POLYCYSTIC OVARIAN SYNDROME): ICD-10-CM

## 2024-11-26 ENCOUNTER — MYC MEDICAL ADVICE (OUTPATIENT)
Dept: FAMILY MEDICINE | Facility: OTHER | Age: 24
End: 2024-11-26

## 2024-11-26 DIAGNOSIS — E28.2 PCOS (POLYCYSTIC OVARIAN SYNDROME): ICD-10-CM

## 2024-11-26 NOTE — TELEPHONE ENCOUNTER
Metformin 500mg  Last Written Prescription Date:  10/28/2024  Last Fill Quantity: 180,   # refills: 3  Last Office Visit: 11/18/2024  Future Office visit:       Routing refill request to provider for review/approval because:  Drug not on the [facility], P or [facility] refill protocol or controlled substance

## 2024-12-09 ENCOUNTER — NURSE TRIAGE (OUTPATIENT)
Dept: CARE COORDINATION | Facility: OTHER | Age: 24
End: 2024-12-09

## 2024-12-09 ENCOUNTER — MYC MEDICAL ADVICE (OUTPATIENT)
Dept: FAMILY MEDICINE | Facility: OTHER | Age: 24
End: 2024-12-09

## 2024-12-09 NOTE — TELEPHONE ENCOUNTER
"Disposition: see in office today    Patient called with painful, frequent urination that started 2-3 days ago. Patient denies fever, back pain or blood in urine. Offered to look for appointment with covering provider today. Patient declined asking to be seen tomorrow. Patient scheduled to be seen by covering provider tomorrow. Patient advised to be seen in UC with any new, worsening or concerning symptoms. Patient verbalized understanding.    Reason for Disposition   Painful urination AND EITHER frequency or urgency    Additional Information   Negative: Shock suspected (e.g., cold/pale/clammy skin, too weak to stand, low BP, rapid pulse)   Negative: Sounds like a life-threatening emergency to the triager   Negative: Followed a genital area injury (e.g., vagina, vulva)   Negative: Taking antibiotic for urinary tract infection (UTI)   Negative: Pregnant   Negative: Unable to urinate (or only a few drops) and bladder feels very full   Negative: Vomiting   Negative: Patient sounds very sick or weak to the triager   Negative: Severe pain with urination   Negative: Fever > 100.4 F (38.0 C)   Negative: Side (flank) or lower back pain present   Negative: Unusual vaginal discharge   Negative: > 2 UTIs in last year   Negative: Patient is worried they have a sexually transmitted infection (STI)   Negative: Age > 50 years   Negative: Possibility of pregnancy    Answer Assessment - Initial Assessment Questions  1. SEVERITY: \"How bad is the pain?\"  (e.g., Scale 1-10; mild, moderate, or severe)      mild  2. FREQUENCY: \"How many times have you had painful urination today?\"       twice  3. PATTERN: \"Is pain present every time you urinate or just sometimes?\"       intermittent  4. ONSET: \"When did the painful urination start?\"       Couple days  5. FEVER: \"Do you have a fever?\" If Yes, ask: \"What is your temperature, how was it measured, and when did it start?\"      No fever  6. PAST UTI: \"Have you had a urine infection before?\" If " "Yes, ask: \"When was the last time?\" and \"What happened that time?\"       Yes, unsure when.  7. CAUSE: \"What do you think is causing the painful urination?\"  (e.g., UTI, scratch, Herpes sore)      UTI  8. OTHER SYMPTOMS: \"Do you have any other symptoms?\" (e.g., blood in urine, flank pain, genital sores, urgency, vaginal discharge)      Urgency and odorous urine  9. PREGNANCY: \"Is there any chance you are pregnant?\" \"When was your last menstrual period?\"      no    Protocols used: Urination Pain - Female-A-[place]    "

## 2025-01-14 ENCOUNTER — MYC MEDICAL ADVICE (OUTPATIENT)
Dept: FAMILY MEDICINE | Facility: OTHER | Age: 25
End: 2025-01-14

## 2025-01-23 RX ORDER — OSELTAMIVIR PHOSPHATE 75 MG/1
75 CAPSULE ORAL 2 TIMES DAILY
Qty: 10 CAPSULE | Refills: 0 | OUTPATIENT
Start: 2025-01-23 | End: 2025-01-28

## 2025-03-25 ENCOUNTER — MYC MEDICAL ADVICE (OUTPATIENT)
Dept: FAMILY MEDICINE | Facility: OTHER | Age: 25
End: 2025-03-25

## 2025-03-31 ENCOUNTER — MYC MEDICAL ADVICE (OUTPATIENT)
Dept: FAMILY MEDICINE | Facility: OTHER | Age: 25
End: 2025-03-31

## 2025-04-07 ENCOUNTER — PATIENT OUTREACH (OUTPATIENT)
Dept: CARE COORDINATION | Facility: CLINIC | Age: 25
End: 2025-04-07
Payer: COMMERCIAL

## 2025-04-08 ENCOUNTER — OFFICE VISIT (OUTPATIENT)
Dept: FAMILY MEDICINE | Facility: OTHER | Age: 25
End: 2025-04-08
Attending: NURSE PRACTITIONER
Payer: COMMERCIAL

## 2025-04-08 ENCOUNTER — MYC MEDICAL ADVICE (OUTPATIENT)
Dept: FAMILY MEDICINE | Facility: OTHER | Age: 25
End: 2025-04-08

## 2025-04-08 VITALS
RESPIRATION RATE: 18 BRPM | BODY MASS INDEX: 25 KG/M2 | SYSTOLIC BLOOD PRESSURE: 112 MMHG | DIASTOLIC BLOOD PRESSURE: 70 MMHG | HEART RATE: 86 BPM | WEIGHT: 124 LBS | OXYGEN SATURATION: 100 % | HEIGHT: 59 IN | TEMPERATURE: 98.2 F

## 2025-04-08 DIAGNOSIS — K21.00 GASTROESOPHAGEAL REFLUX DISEASE WITH ESOPHAGITIS WITHOUT HEMORRHAGE: ICD-10-CM

## 2025-04-08 DIAGNOSIS — R10.2 PELVIC PAIN IN FEMALE: ICD-10-CM

## 2025-04-08 DIAGNOSIS — F41.9 ANXIETY: ICD-10-CM

## 2025-04-08 DIAGNOSIS — R30.0 DYSURIA: ICD-10-CM

## 2025-04-08 DIAGNOSIS — D50.9 IRON DEFICIENCY ANEMIA, UNSPECIFIED IRON DEFICIENCY ANEMIA TYPE: ICD-10-CM

## 2025-04-08 DIAGNOSIS — E28.2 PCOS (POLYCYSTIC OVARIAN SYNDROME): ICD-10-CM

## 2025-04-08 DIAGNOSIS — Z11.51 SCREENING FOR HUMAN PAPILLOMAVIRUS: ICD-10-CM

## 2025-04-08 DIAGNOSIS — B96.89 BV (BACTERIAL VAGINOSIS): ICD-10-CM

## 2025-04-08 DIAGNOSIS — N76.0 BV (BACTERIAL VAGINOSIS): ICD-10-CM

## 2025-04-08 DIAGNOSIS — Z00.00 ROUTINE GENERAL MEDICAL EXAMINATION AT A HEALTH CARE FACILITY: Primary | ICD-10-CM

## 2025-04-08 LAB
ALBUMIN UR-MCNC: 10 MG/DL
ANION GAP SERPL CALCULATED.3IONS-SCNC: 10 MMOL/L (ref 7–15)
APPEARANCE UR: CLEAR
BACTERIAL VAGINOSIS VAG-IMP: POSITIVE
BASOPHILS # BLD AUTO: 0 10E3/UL (ref 0–0.2)
BASOPHILS NFR BLD AUTO: 1 %
BILIRUB UR QL STRIP: NEGATIVE
BUN SERPL-MCNC: 13.9 MG/DL (ref 6–20)
CALCIUM SERPL-MCNC: 9.2 MG/DL (ref 8.8–10.4)
CANDIDA DNA VAG QL NAA+PROBE: NOT DETECTED
CANDIDA GLABRATA / CANDIDA KRUSEI DNA: NOT DETECTED
CHLORIDE SERPL-SCNC: 106 MMOL/L (ref 98–107)
COLOR UR AUTO: YELLOW
CREAT SERPL-MCNC: 0.75 MG/DL (ref 0.51–0.95)
EGFRCR SERPLBLD CKD-EPI 2021: >90 ML/MIN/1.73M2
EOSINOPHIL # BLD AUTO: 0.3 10E3/UL (ref 0–0.7)
EOSINOPHIL NFR BLD AUTO: 4 %
ERYTHROCYTE [DISTWIDTH] IN BLOOD BY AUTOMATED COUNT: 14.3 % (ref 10–15)
FERRITIN SERPL-MCNC: 8 NG/ML (ref 6–175)
GLUCOSE SERPL-MCNC: 88 MG/DL (ref 70–99)
GLUCOSE UR STRIP-MCNC: NEGATIVE MG/DL
HCO3 SERPL-SCNC: 24 MMOL/L (ref 22–29)
HCT VFR BLD AUTO: 36.3 % (ref 35–47)
HGB BLD-MCNC: 11.7 G/DL (ref 11.7–15.7)
HGB UR QL STRIP: NEGATIVE
HYALINE CASTS: 2 /LPF
IMM GRANULOCYTES # BLD: 0 10E3/UL
IMM GRANULOCYTES NFR BLD: 0 %
IRON BINDING CAPACITY (ROCHE): 512 UG/DL (ref 240–430)
IRON SATN MFR SERPL: 14 % (ref 15–46)
IRON SERPL-MCNC: 71 UG/DL (ref 37–145)
KETONES UR STRIP-MCNC: NEGATIVE MG/DL
LEUKOCYTE ESTERASE UR QL STRIP: NEGATIVE
LYMPHOCYTES # BLD AUTO: 2 10E3/UL (ref 0.8–5.3)
LYMPHOCYTES NFR BLD AUTO: 30 %
MCH RBC QN AUTO: 26.1 PG (ref 26.5–33)
MCHC RBC AUTO-ENTMCNC: 32.2 G/DL (ref 31.5–36.5)
MCV RBC AUTO: 81 FL (ref 78–100)
MONOCYTES # BLD AUTO: 0.5 10E3/UL (ref 0–1.3)
MONOCYTES NFR BLD AUTO: 7 %
MUCOUS THREADS #/AREA URNS LPF: PRESENT /LPF
NEUTROPHILS # BLD AUTO: 4 10E3/UL (ref 1.6–8.3)
NEUTROPHILS NFR BLD AUTO: 59 %
NITRATE UR QL: NEGATIVE
NRBC # BLD AUTO: 0 10E3/UL
NRBC BLD AUTO-RTO: 0 /100
PH UR STRIP: 5.5 [PH] (ref 4.7–8)
PLATELET # BLD AUTO: 360 10E3/UL (ref 150–450)
POTASSIUM SERPL-SCNC: 3.9 MMOL/L (ref 3.4–5.3)
RBC # BLD AUTO: 4.49 10E6/UL (ref 3.8–5.2)
RBC URINE: 0 /HPF
SODIUM SERPL-SCNC: 140 MMOL/L (ref 135–145)
SP GR UR STRIP: 1.03 (ref 1–1.03)
SQUAMOUS EPITHELIAL: 1 /HPF
T VAGINALIS DNA VAG QL NAA+PROBE: NOT DETECTED
UROBILINOGEN UR STRIP-MCNC: NORMAL MG/DL
WBC # BLD AUTO: 6.7 10E3/UL (ref 4–11)
WBC URINE: <1 /HPF

## 2025-04-08 RX ORDER — ESCITALOPRAM OXALATE 10 MG/1
10 TABLET ORAL DAILY
Qty: 90 TABLET | Refills: 1 | Status: SHIPPED | OUTPATIENT
Start: 2025-04-08

## 2025-04-08 SDOH — HEALTH STABILITY: PHYSICAL HEALTH: ON AVERAGE, HOW MANY DAYS PER WEEK DO YOU ENGAGE IN MODERATE TO STRENUOUS EXERCISE (LIKE A BRISK WALK)?: 1 DAY

## 2025-04-08 ASSESSMENT — PATIENT HEALTH QUESTIONNAIRE - PHQ9
SUM OF ALL RESPONSES TO PHQ QUESTIONS 1-9: 10
10. IF YOU CHECKED OFF ANY PROBLEMS, HOW DIFFICULT HAVE THESE PROBLEMS MADE IT FOR YOU TO DO YOUR WORK, TAKE CARE OF THINGS AT HOME, OR GET ALONG WITH OTHER PEOPLE: NOT DIFFICULT AT ALL
SUM OF ALL RESPONSES TO PHQ QUESTIONS 1-9: 10

## 2025-04-08 ASSESSMENT — PAIN SCALES - GENERAL: PAINLEVEL_OUTOF10: MILD PAIN (3)

## 2025-04-08 ASSESSMENT — SOCIAL DETERMINANTS OF HEALTH (SDOH): HOW OFTEN DO YOU GET TOGETHER WITH FRIENDS OR RELATIVES?: TWICE A WEEK

## 2025-04-08 NOTE — PATIENT INSTRUCTIONS
Patient Education   Preventive Care Advice   This is general advice given by our system to help you stay healthy. However, your care team may have specific advice just for you. Please talk to your care team about your preventive care needs.  Nutrition  Eat 5 or more servings of fruits and vegetables each day.  Try wheat bread, brown rice and whole grain pasta (instead of white bread, rice, and pasta).  Get enough calcium and vitamin D. Check the label on foods and aim for 100% of the RDA (recommended daily allowance).  Lifestyle  Exercise at least 150 minutes each week  (30 minutes a day, 5 days a week).  Do muscle strengthening activities 2 days a week. These help control your weight and prevent disease.  No smoking.  Wear sunscreen to prevent skin cancer.  Have a dental exam and cleaning every 6 months.  Yearly exams  See your health care team every year to talk about:  Any changes in your health.  Any medicines your care team has prescribed.  Preventive care, family planning, and ways to prevent chronic diseases.  Shots (vaccines)   HPV shots (up to age 26), if you've never had them before.  Hepatitis B shots (up to age 59), if you've never had them before.  COVID-19 shot: Get this shot when it's due.  Flu shot: Get a flu shot every year.  Tetanus shot: Get a tetanus shot every 10 years.  Pneumococcal, hepatitis A, and RSV shots: Ask your care team if you need these based on your risk.  Shingles shot (for age 50 and up)  General health tests  Diabetes screening:  Starting at age 35, Get screened for diabetes at least every 3 years.  If you are younger than age 35, ask your care team if you should be screened for diabetes.  Cholesterol test: At age 39, start having a cholesterol test every 5 years, or more often if advised.  Bone density scan (DEXA): At age 50, ask your care team if you should have this scan for osteoporosis (brittle bones).  Hepatitis C: Get tested at least once in your life.  STIs (sexually  transmitted infections)  Before age 24: Ask your care team if you should be screened for STIs.  After age 24: Get screened for STIs if you're at risk. You are at risk for STIs (including HIV) if:  You are sexually active with more than one person.  You don't use condoms every time.  You or a partner was diagnosed with a sexually transmitted infection.  If you are at risk for HIV, ask about PrEP medicine to prevent HIV.  Get tested for HIV at least once in your life, whether you are at risk for HIV or not.  Cancer screening tests  Cervical cancer screening: If you have a cervix, begin getting regular cervical cancer screening tests starting at age 21.  Breast cancer scan (mammogram): If you've ever had breasts, begin having regular mammograms starting at age 40. This is a scan to check for breast cancer.  Colon cancer screening: It is important to start screening for colon cancer at age 45.  Have a colonoscopy test every 10 years (or more often if you're at risk) Or, ask your provider about stool tests like a FIT test every year or Cologuard test every 3 years.  To learn more about your testing options, visit:   .  For help making a decision, visit:   https://bit.ly/qn66372.  Prostate cancer screening test: If you have a prostate, ask your care team if a prostate cancer screening test (PSA) at age 55 is right for you.  Lung cancer screening: If you are a current or former smoker ages 50 to 80, ask your care team if ongoing lung cancer screenings are right for you.  For informational purposes only. Not to replace the advice of your health care provider. Copyright   2023 [facility]. All rights reserved. Clinically reviewed by the [facility]. SafetyTat 248842 - REV 01/24.  Learning About Stress  What is stress?     Stress is your body's response to a hard situation. Your body can have a physical, emotional, or mental response. Stress is a fact of life for most people, and it  affects everyone differently. What causes stress for you may not be stressful for someone else.  A lot of things can cause stress. You may feel stress when you go on a job interview, take a test, or run a race. This kind of short-term stress is normal and even useful. It can help you if you need to work hard or react quickly. For example, stress can help you finish an important job on time.  Long-term stress is caused by ongoing stressful situations or events. Examples of long-term stress include long-term health problems, ongoing problems at work, or conflicts in your family. Long-term stress can harm your health.  How does stress affect your health?  When you are stressed, your body responds as though you are in danger. It makes hormones that speed up your heart, make you breathe faster, and give you a burst of energy. This is called the fight-or-flight stress response. If the stress is over quickly, your body goes back to normal and no harm is done.  But if stress happens too often or lasts too long, it can have bad effects. Long-term stress can make you more likely to get sick, and it can make symptoms of some diseases worse. If you tense up when you are stressed, you may develop neck, shoulder, or low back pain. Stress is linked to high blood pressure and heart disease.  Stress also harms your emotional health. It can make you eden, tense, or depressed. Your relationships may suffer, and you may not do well at work or school.  What can you do to manage stress?  You can try these things to help manage stress:   Do something active. Exercise or activity can help reduce stress. Walking is a great way to get started. Even everyday activities such as housecleaning or yard work can help.  Try yoga or jourdan chi. These techniques combine exercise and meditation. You may need some training at first to learn them.  Do something you enjoy. For example, listen to music or go to a movie. Practice your hobby or do volunteer  "work.  Meditate. This can help you relax, because you are not worrying about what happened before or what may happen in the future.  Do guided imagery. Imagine yourself in any setting that helps you feel calm. You can use online videos, books, or a teacher to guide you.  Do breathing exercises. For example:  From a standing position, bend forward from the waist with your knees slightly bent. Let your arms dangle close to the floor.  Breathe in slowly and deeply as you return to a standing position. Roll up slowly and lift your head last.  Hold your breath for just a few seconds in the standing position.  Breathe out slowly and bend forward from the waist.  Let your feelings out. Talk, laugh, cry, and express anger when you need to. Talking with supportive friends or family, a counselor, or a ginette leader about your feelings is a healthy way to relieve stress. Avoid discussing your feelings with people who make you feel worse.  Write. It may help to write about things that are bothering you. This helps you find out how much stress you feel and what is causing it. When you know this, you can find better ways to cope.  What can you do to prevent stress?  You might try some of these things to help prevent stress:  Manage your time. This helps you find time to do the things you want and need to do.  Get enough sleep. Your body recovers from the stresses of the day while you are sleeping.  Get support. Your family, friends, and community can make a difference in how you experience stress.  Limit your news feed. Avoid or limit time on social media or news that may make you feel stressed.  Do something active. Exercise or activity can help reduce stress. Walking is a great way to get started.  Where can you learn more?  Go to https://www.Modlar.net/patiented  Enter N032 in the search box to learn more about \"Learning About Stress.\"  Current as of: October 24, 2024  Content Version: 14.4 2024-2025 [employer].   Care instructions adapted under license by your healthcare professional. If you have questions about a medical condition or this instruction, always ask your healthcare professional. [employer] disclaims any warranty or liability for your use of this information.    Learning About Depression Screening  What is depression screening?  Depression screening is a way to see if you have depression symptoms. It may be done by a doctor or counselor. It's often part of a routine checkup. That's because your mental health is just as important as your physical health.  Depression is a mental health condition that affects how you feel, think, and act. You may:  Have less energy.  Lose interest in your daily activities.  Feel sad and grouchy for a long time.  Depression is very common. It affects people of all ages.  Many things can lead to depression. Some people become depressed after they have a stroke or find out they have a major illness like cancer or heart disease. The death of a loved one or a breakup may lead to depression. It can run in families. Most experts believe that a combination of inherited genes and stressful life events can cause it.  What happens during screening?  You may be asked to fill out a form about your depression symptoms. You and the doctor will discuss your answers. The doctor may ask you more questions to learn more about how you think, act, and feel.  What happens after screening?  If you have symptoms of depression, your doctor will talk to you about your options.  Doctors usually treat depression with medicines or counseling. Often, combining the two works best. Many people don't get help because they think that they'll get over the depression on their own. But people with depression may not get better unless they get treatment.  The cause of depression is not well understood. There may be many factors involved. But if you have depression, it's not your fault.  A serious  "symptom of depression is thinking about death or suicide. If you or someone you care about talks about this or about feeling hopeless, get help right away.  It's important to know that depression can be treated. Medicine, counseling, and self-care may help.  Where can you learn more?  Go to https://www.Nextcar.com.net/patiented  Enter T185 in the search box to learn more about \"Learning About Depression Screening.\"  Current as of: July 31, 2024  Content Version: 14.4    5204-7885 [employer].   Care instructions adapted under license by your healthcare professional. If you have questions about a medical condition or this instruction, always ask your healthcare professional. [employer] disclaims any warranty or liability for your use of this information.      Try taking 1/2 dose of lexapro and see if symptoms improve    For constipation  Take miralax no bowel movement during the day  Make sure you are staying hydrated     Ok to take omeprazole daily since you continue to have reflux      "

## 2025-04-08 NOTE — PROGRESS NOTES
Preventive Care Visit  [facility]  RONEL Garcia CNP, Family Medicine  Apr 8, 2025      Assessment & Plan     Routine general medical examination at a health care facility  Continue yearly physicals     Dysuria  Concerns for some mild dehydration   - UA with Microscopic reflex to Culture - [place]; Future  - UA with Microscopic reflex to Culture - [place]    Screening for human papillomavirus  Will notify when labs available   - Gynecologic Cytology (PAP)      BV (bacterial vaginosis)  Comment: will treat due to symptoms and positive for BV  - Multiplex Vaginal Panel by PCR,   -metroNIDAZOLE (METROGEL) 0.75 % vaginal gel              Iron deficiency anemia, unspecified iron deficiency anemia type  Ok to take iron supplement 2-3 times a week   Follow up if symptoms do not improve   - CBC with platelets and differential; Future  - Ferritin; Future  - Iron and iron binding capacity; Future  - Vitamin B12; Future  - CBC with platelets and differential  - Ferritin  - Iron and iron binding capacity  - Vitamin B12    Gastroesophageal reflux disease with esophagitis without hemorrhage  Reflux clears if taking omeprazole 20 mg daily.  Returns once she is off for 2 days   Encouraged to take regularly for 3-6 months and then consider weaning again       PCOS (polycystic ovarian syndrome)  Does have some pain in pelvic area and lower abdomen bilateral side - plan for pelvic US   Refilled metformin - continue use due to PCOS  Referral to OB/GYN - would like to consider getting pregnant again later this year.    - metFORMIN (GLUCOPHAGE) 500 MG tablet; Take 1 tablet (500 mg) by mouth 2 times daily (with meals).  - Ob/Gyn  Referral  - US Pelvic Complete with Transvaginal; Future    Anxiety  Reviewed labs - concerns for some anemia, treatment as discusse  Normal electrolytes and kidney fuction  Come concerns for some mild dehydration with protein in urine increase fluid intake   - Basic metabolic  "panel; Future  - escitalopram (LEXAPRO) 10 MG tablet; Take 1 tablet (10 mg) by mouth daily.  - Basic metabolic panel    Pelvic pain in female  As above   - US Pelvic Complete with Transvaginal; Future    Patient has been advised of split billing requirements and indicates understanding: Yes        BMI  Estimated body mass index is 25.04 kg/m  as calculated from the following:    Height as of this encounter: 1.499 m (4' 11\").    Weight as of this encounter: 56.2 kg (124 lb).       Counseling  Appropriate preventive services were addressed with this patient via screening, questionnaire, or discussion as appropriate for fall prevention, nutrition, physical activity, Tobacco-use cessation, social engagement, weight loss and cognition.  Checklist reviewing preventive services available has been given to the patient.  Reviewed patient's diet, addressing concerns and/or questions.   She is at risk for lack of exercise and has been provided with information to increase physical activity for the benefit of her well-being.   The patient was instructed to see the dentist every 6 months.   She is at risk for psychosocial distress and has been provided with information to reduce risk.   The patient's PHQ-9 score is consistent with moderate depression. She was provided with information regarding depression.       See Patient Instructions    Return in about 53 weeks (around 4/14/2026) for Annual Wellness Visit.    Francis Tsang is a 24 year old, presenting for the following:  Physical and Depression        4/8/2025     3:03 PM   Additional Questions   Roomed by KIM Servin CMA   Accompanied by Self         4/8/2025     3:03 PM   Patient Reported Additional Medications   Patient reports taking the following new medications None          HPI     Depression and Anxiety   How are you doing with your depression since your last visit? No change  How are you doing with your anxiety since your last visit?  No change  Are you " having other symptoms that might be associated with depression or anxiety? Yes:  no motivation to get out and do stuff  Have you had a significant life event? No   Do you have any concerns with your use of alcohol or other drugs? No    Social History     Tobacco Use    Smoking status: Never    Smokeless tobacco: Never   Vaping Use    Vaping status: Never Used   Substance Use Topics    Alcohol use: No    Drug use: No         8/17/2023     9:59 AM 5/6/2024     5:33 AM 4/8/2025     2:58 PM   PHQ   PHQ-9 Total Score 2 3 10    Q9: Thoughts of better off dead/self-harm past 2 weeks Not at all Not at all Not at all       Patient-reported         7/20/2023     9:57 AM 8/17/2023     9:59 AM 5/6/2024     5:34 AM   GLORIA-7 SCORE   Total Score   3 (minimal anxiety)   Total Score 0 0 3         4/8/2025     2:58 PM   Last PHQ-9   1.  Little interest or pleasure in doing things 3   2.  Feeling down, depressed, or hopeless 1   3.  Trouble falling or staying asleep, or sleeping too much 3   4.  Feeling tired or having little energy 3   5.  Poor appetite or overeating 0   6.  Feeling bad about yourself 0   7.  Trouble concentrating 0   8.  Moving slowly or restless 0   Q9: Thoughts of better off dead/self-harm past 2 weeks 0   PHQ-9 Total Score 10        Patient-reported         5/6/2024     5:34 AM   GLORIA-7    1. Feeling nervous, anxious, or on edge 1   2. Not being able to stop or control worrying 0   3. Worrying too much about different things 0   4. Trouble relaxing 0   5. Being so restless that it is hard to sit still 0   6. Becoming easily annoyed or irritable 1   7. Feeling afraid, as if something awful might happen 1   GLORIA-7 Total Score 3   If you checked any problems, how difficult have they made it for you to do your work, take care of things at home, or get along with other people? Somewhat difficult       Suicide Assessment Five-step Evaluation and Treatment (SAFE-T)      Advance Care Planning  Patient does not have a Health  Care Directive: Discussed advance care planning with patient; information given to patient to review.      4/8/2025   General Health   How would you rate your overall physical health? Good   Feel stress (tense, anxious, or unable to sleep) To some extent   (!) STRESS CONCERN      4/8/2025   Nutrition   Three or more servings of calcium each day? Yes   Diet: Regular (no restrictions)   How many servings of fruit and vegetables per day? (!) 0-1   How many sweetened beverages each day? 0-1         4/8/2025   Exercise   Days per week of moderate/strenous exercise 1 day   (!) EXERCISE CONCERN      4/8/2025   Social Factors   Frequency of gathering with friends or relatives Twice a week   Worry food won't last until get money to buy more No   Food not last or not have enough money for food? No   Do you have housing? (Housing is defined as stable permanent housing and does not include staying ouside in a car, in a tent, in an abandoned building, in an overnight shelter, or couch-surfing.) Yes   Are you worried about losing your housing? No   Lack of transportation? No   Unable to get utilities (heat,electricity)? No         4/8/2025   Dental   Dentist two times every year? (!) NO           4/27/2024   TB Screening   Were you born outside of the [place]? No         Today's PHQ-9 Score:       4/8/2025     2:58 PM   PHQ-9 SCORE   PHQ-9 Total Score MyChart 10 (Moderate depression)   PHQ-9 Total Score 10        Patient-reported         4/8/2025   Substance Use   Alcohol more than 3/day or more than 7/wk No   Do you use any other substances recreationally? No     Social History     Tobacco Use    Smoking status: Never    Smokeless tobacco: Never   Vaping Use    Vaping status: Never Used   Substance Use Topics    Alcohol use: No    Drug use: No          Mammogram Screening - Patient under 40 years of age: Routine Mammogram Screening not recommended.         4/8/2025   STI Screening   New sexual partner(s) since last STI/HIV test? No      History of abnormal Pap smear:         2023     9:05 AM 5/3/2021    11:43 AM   PAP / HPV   PAP Negative for Intraepithelial Lesion or Malignancy (NILM)     PAP (Historical)  NIL            2025   Contraception/Family Planning   Questions about contraception or family planning (!) YES wants to try and get pregnant again later this year         Reviewed and updated as needed this visit by Provider                    Lab work is in process  BP Readings from Last 3 Encounters:   25 112/70   24 106/74   10/15/24 100/58    Wt Readings from Last 3 Encounters:   25 56.2 kg (124 lb)   24 55.7 kg (122 lb 12.8 oz)   10/15/24 55.6 kg (122 lb 9.6 oz)                  Patient Active Problem List   Diagnosis    Adjustment disorder with depressed mood    PTSD (post-traumatic stress disorder)    Anxiety    Astigmatism    PCOS (polycystic ovarian syndrome)     Past Surgical History:   Procedure Laterality Date     SECTION N/A 2023    Procedure:  SECTION girl at 0758;  Surgeon: Tai Hoang MD;  Location: [facility] OR    COMBINED CYSTOSCOPY, URETEROSCOPY, LASER HOLMIUM LITHOTRIPSY URETER(S) Left 2023    Procedure: Left ureteroscopy with laser lithotripsy and stent placement;  Surgeon: James Hanley MD;  Location: [facility] OR    COMBINED CYSTOSCOPY, URETEROSCOPY, LASER HOLMIUM LITHOTRIPSY URETER(S) Left 3/9/2023    Procedure: Left ureteroscopy with laser lithotripsy and stent placement;  Surgeon: James Hanley MD;  Location: [facility] OR    WISDOM TOOTH EXTRACTION Bilateral        Social History     Tobacco Use    Smoking status: Never    Smokeless tobacco: Never   Substance Use Topics    Alcohol use: No     Family History   Problem Relation Age of Onset    Mental Illness Mother     Mental Illness Father          Current Outpatient Medications   Medication Sig Dispense Refill    albuterol (PROAIR HFA/PROVENTIL HFA/VENTOLIN HFA) 108 (90 Base) MCG/ACT inhaler Inhale 2 puffs into the lungs  "every 4 hours as needed      escitalopram (LEXAPRO) 10 MG tablet TAKE 1 TABLET(10 MG) BY MOUTH DAILY 90 tablet 1    etonogestrel-ethinyl estradiol (HALOETTE) 0.12-0.015 MG/24HR vaginal ring Insert one (1) ring vaginally and leave in place for 3 consecutive weeks (21 days), then remove for 1 week. 9 each 3    ferrous gluconate (FERGON) 324 (38 Fe) MG tablet Take 1 tablet (324 mg) by mouth daily (with breakfast) 90 tablet 1    metFORMIN (GLUCOPHAGE) 500 MG tablet Take 1 tablet (500 mg) by mouth 2 times daily (with meals). 180 tablet 3    scopolamine (TRANSDERM) 1 MG/3DAYS 72 hr patch APPLY 1 PATCH EVERY 3 DAYS TO CLEAN DRY SKIN BEHIND THE EAR AS NEEDED. APPLY AT LEAST 4 HOURS BEFORE EFFECTS NEEDED (Patient not taking: Reported on 4/8/2025)       Allergies   Allergen Reactions    Apple Juice      Gums/mouth gets itchy  Regular apples  Ok to have apple juice-just allergic to \"raw apples\" per patient    Lazar      Gums/mouth gets itchy    Pistachios [Nuts]          Review of Systems  CONSTITUTIONAL: NEGATIVE for fever, chills, change in weight  INTEGUMENTARY/SKIN: hx eczema   EYES: light sensitive   ENT/MOUTH: NEGATIVE for ear, mouth and throat problems  RESP:NEGATIVE for significant cough or SOB  CV: NEGATIVE for chest pain, palpitations or peripheral edema  GI: constipation, gas or bloating, and heartburn or reflux  : normal menstrual cycles and denies dysuria but does have some irritation around urethra   MUSCULOSKELETAL: NEGATIVE for significant arthralgias or myalgia  NEURO: mild dizziness   ENDOCRINE: NEGATIVE for temperature intolerance, skin/hair changes  PSYCHIATRIC: HX anxiety and HX depression     Objective    Exam  /70   Pulse 86   Temp 98.2  F (36.8  C) (Tympanic)   Resp 18   Ht 1.499 m (4' 11\")   Wt 56.2 kg (124 lb)   SpO2 100%   BMI 25.04 kg/m     Estimated body mass index is 25.04 kg/m  as calculated from the following:    Height as of this encounter: 1.499 m (4' 11\").    Weight as of this " encounter: 56.2 kg (124 lb).    Physical Exam  GENERAL: alert and no distress  EYES: Eyes grossly normal to inspection, PERRL and conjunctivae and sclerae normal  HENT: ear canals and TM's normal, nose and mouth without ulcers or lesions  NECK: no adenopathy, no asymmetry, masses, or scars  RESP: lungs clear to auscultation - no rales, rhonchi or wheezes  CV: regular rate and rhythm, normal S1 S2, no S3 or S4, no murmur, click or rub, no peripheral edema  ABDOMEN: soft, nontender, no hepatosplenomegaly, no masses and bowel sounds normal   (female): normal female external genitalia, normal urethral meatus , normal cervix, adnexae, and uterus without masses., and vaginal swelling and tender   MS: no gross musculoskeletal defects noted, no edema  SKIN: no suspicious lesions or rashes  NEURO: Normal strength and tone, mentation intact and speech normal  PSYCH: mentation appears normal, affect normal/bright  LYMPH: no cervical, supraclavicular, axillary, or inguinal adenopathy        Signed Electronically by: RONEL Garcia CNP    Answers submitted by the patient for this visit:  Patient Health Questionnaire (Submitted on 4/8/2025)  If you checked off any problems, how difficult have these problems made it for you to do your work, take care of things at home, or get along with other people?: Not difficult at all  PHQ9 TOTAL SCORE: 10

## 2025-04-09 ENCOUNTER — HOSPITAL ENCOUNTER (OUTPATIENT)
Dept: ULTRASOUND IMAGING | Facility: HOSPITAL | Age: 25
Discharge: HOME | End: 2025-04-09
Attending: NURSE PRACTITIONER
Payer: COMMERCIAL

## 2025-04-09 DIAGNOSIS — R10.2 PELVIC PAIN IN FEMALE: ICD-10-CM

## 2025-04-09 DIAGNOSIS — E28.2 PCOS (POLYCYSTIC OVARIAN SYNDROME): ICD-10-CM

## 2025-04-09 LAB — VIT B12 SERPL-MCNC: 544 PG/ML (ref 232–1245)

## 2025-04-09 PROCEDURE — 76856 US EXAM PELVIC COMPLETE: CPT

## 2025-04-09 RX ORDER — METRONIDAZOLE 7.5 MG/G
1 GEL VAGINAL DAILY
Qty: 70 G | Refills: 0 | Status: SHIPPED | OUTPATIENT
Start: 2025-04-09

## 2025-04-16 LAB
BKR LAB AP GYN ADEQUACY: NORMAL
BKR LAB AP GYN INTERPRETATION: NORMAL
BKR LAB AP HPV REFLEX: NORMAL
BKR LAB AP PREVIOUS ABNORMAL: NORMAL
PATH REPORT.COMMENTS IMP SPEC: NORMAL
PATH REPORT.COMMENTS IMP SPEC: NORMAL
PATH REPORT.RELEVANT HX SPEC: NORMAL

## 2025-04-20 ENCOUNTER — MYC MEDICAL ADVICE (OUTPATIENT)
Dept: FAMILY MEDICINE | Facility: OTHER | Age: 25
End: 2025-04-20

## 2025-04-20 DIAGNOSIS — N76.0 BV (BACTERIAL VAGINOSIS): Primary | ICD-10-CM

## 2025-04-20 DIAGNOSIS — B96.89 BV (BACTERIAL VAGINOSIS): Primary | ICD-10-CM

## 2025-04-21 ENCOUNTER — LAB (OUTPATIENT)
Dept: LAB | Facility: OTHER | Age: 25
End: 2025-04-21
Payer: COMMERCIAL

## 2025-04-21 DIAGNOSIS — K21.00 GASTROESOPHAGEAL REFLUX DISEASE WITH ESOPHAGITIS WITHOUT HEMORRHAGE: Primary | ICD-10-CM

## 2025-04-21 DIAGNOSIS — B96.89 BV (BACTERIAL VAGINOSIS): ICD-10-CM

## 2025-04-21 DIAGNOSIS — N76.0 BV (BACTERIAL VAGINOSIS): ICD-10-CM

## 2025-04-21 LAB
BACTERIAL VAGINOSIS VAG-IMP: NEGATIVE
CANDIDA DNA VAG QL NAA+PROBE: NOT DETECTED
CANDIDA GLABRATA / CANDIDA KRUSEI DNA: NOT DETECTED
T VAGINALIS DNA VAG QL NAA+PROBE: NOT DETECTED

## 2025-04-21 PROCEDURE — 81515 NFCT DS BV&VAGINITIS DNA ALG: CPT

## 2025-04-21 RX ORDER — OMEPRAZOLE 20 MG/1
20 CAPSULE, DELAYED RELEASE ORAL DAILY
Qty: 90 CAPSULE | Refills: 1 | Status: SHIPPED | OUTPATIENT
Start: 2025-04-21

## 2025-04-21 NOTE — TELEPHONE ENCOUNTER
I pended the swab for you to sign. Patient wants to self swab I don't see that as a option on the order, I just added it in the comment section I will call patient and get her scheduled once I hear back from you.

## 2025-05-27 ENCOUNTER — MYC MEDICAL ADVICE (OUTPATIENT)
Dept: FAMILY MEDICINE | Facility: OTHER | Age: 25
End: 2025-05-27

## 2025-06-10 ENCOUNTER — NURSE TRIAGE (OUTPATIENT)
Dept: CARE COORDINATION | Facility: OTHER | Age: 25
End: 2025-06-10

## 2025-06-10 ENCOUNTER — MYC MEDICAL ADVICE (OUTPATIENT)
Dept: FAMILY MEDICINE | Facility: OTHER | Age: 25
End: 2025-06-10

## 2025-06-10 NOTE — TELEPHONE ENCOUNTER
Request for possible overbook.    Received [employer] message from patient. Spoke with patient. Patient reports 5 days of constant headache. Patient reports headache started in one eye and now is in both eye brows, sinuses, forehead and around the head. Patient denies any one sided weakness or numbness. Patient denies any slurred or garbled speech. Per protocol patient to be seen in clinic today or tomorrow. Request for possible overbook due to providers schedule being full.   Patient advised to be evaluated in UC/ED with any new, worsening or concerning symptoms. Patient verbalized understanding.    Reason for Disposition   MODERATE headache (e.g., interferes with normal activities) present > 24 hours and unexplained   MILD - MODERATE headache present > 3 days (72 hours)    Additional Information   Negative: Difficult to awaken or acting confused (e.g., disoriented, slurred speech)   Negative: Weakness of the face, arm or leg on one side of the body and new-onset   Negative: Numbness of the face, arm or leg on one side of the body and new-onset   Negative: Loss of speech or garbled speech and new-onset   Negative: Passed out (e.g., fainted, lost consciousness, blacked out and was not responding)   Negative: Sounds like a life-threatening emergency to the triager   Negative: Followed a head injury within last 3 days   Negative: Traumatic Brain Injury (TBI) is suspected   Negative: Sinus pain or congestion is main symptom(s)   Negative: Influenza suspected (i.e., cough, fever, other respiratory symptoms; probable influenza exposure)   Negative: Pregnant   Negative: Unable to walk without falling   Negative: Stiff neck (can't touch chin to chest)   Negative: Other family members (or people in same household) with headaches and possibility of carbon monoxide exposure   Negative: SEVERE headache, states 'worst headache' of life   Negative: SEVERE headache, sudden-onset (i.e., reaching maximum intensity within seconds to 1  "hour)   Negative: Severe pain in one eye   Negative: Loss of vision or double vision (Exception: Same as previously diagnosed migraines.)   Negative: Patient sounds very sick or weak to the triager   Negative: Fever > 103 F (39.4 C)   Negative: Fever > 100 F (37.8 C) and has diabetes mellitus or a weak immune system (e.g., HIV positive, cancer chemotherapy, organ transplant, splenectomy, chronic steroids)   Negative: SEVERE headache (e.g., excruciating) and has had severe headaches before   Negative: SEVERE headache and not relieved by pain meds   Negative: SEVERE headache and vomiting   Negative: SEVERE headache and fever   Negative: New-onset headache and weak immune system (e.g., HIV positive, cancer chemo, splenectomy, organ transplant, chronic steroids)   Negative: Fever present > 3 days (72 hours)   Negative: Patient wants to be seen    Answer Assessment - Initial Assessment Questions  1. LOCATION: \"Where does it hurt?\"       Currently across the whole head \"like a headband\" around the forehead, eye brows, cheek bones and ears  2. ONSET: \"When did the headache start?\" (e.g., minutes, hours, days)       5 days  3. PATTERN: \"Does the pain come and go, or has it been constant since it started?\"      constant  4. SEVERITY: \"How bad is the pain?\" and \"What does it keep you from doing?\"  (e.g., Scale 1-10; mild, moderate, or severe)      Currently 4  5. RECURRENT SYMPTOM: \"Have you ever had headaches before?\" If Yes, ask: \"When was the last time?\" and \"What happened that time?\"       no  6. CAUSE: \"What do you think is causing the headache?\"      Unsure. Patient recently mowed the lawn with dust and wondering if it has something with the dust and pollen  7. MIGRAINE: \"Have you been diagnosed with migraine headaches?\" If Yes, ask: \"Is this headache similar?\"       no  8. HEAD INJURY: \"Has there been any recent injury to the head?\"       no  9. OTHER SYMPTOMS: \"Do you have any other symptoms?\" (e.g., fever, stiff " "neck, eye pain, sore throat, cold symptoms)     Sore shoulders and neck  10. PREGNANCY: \"Is there any chance you are pregnant?\" \"When was your last menstrual period?\"        no    Protocols used: Headache-A-[place]    "

## 2025-06-11 ENCOUNTER — OFFICE VISIT (OUTPATIENT)
Dept: FAMILY MEDICINE | Facility: OTHER | Age: 25
End: 2025-06-11
Attending: NURSE PRACTITIONER
Payer: COMMERCIAL

## 2025-06-11 VITALS
BODY MASS INDEX: 25.65 KG/M2 | WEIGHT: 127 LBS | SYSTOLIC BLOOD PRESSURE: 100 MMHG | TEMPERATURE: 97.6 F | RESPIRATION RATE: 20 BRPM | DIASTOLIC BLOOD PRESSURE: 68 MMHG | HEART RATE: 88 BPM | OXYGEN SATURATION: 98 %

## 2025-06-11 DIAGNOSIS — J01.01 ACUTE RECURRENT MAXILLARY SINUSITIS: Primary | ICD-10-CM

## 2025-06-11 RX ORDER — ALUMINA, MAGNESIA, AND SIMETHICONE 2400; 2400; 240 MG/30ML; MG/30ML; MG/30ML
15 SUSPENSION ORAL
COMMUNITY
Start: 2025-04-20

## 2025-06-11 RX ORDER — ONDANSETRON 4 MG/1
4 TABLET, ORALLY DISINTEGRATING ORAL
COMMUNITY
Start: 2025-04-20

## 2025-06-11 RX ORDER — PREDNISONE 20 MG/1
20 TABLET ORAL 2 TIMES DAILY
Qty: 10 TABLET | Refills: 0 | Status: SHIPPED | OUTPATIENT
Start: 2025-06-11 | End: 2025-06-16

## 2025-06-11 RX ORDER — SUCRALFATE ORAL 1 G/10ML
1 SUSPENSION ORAL
COMMUNITY
Start: 2025-04-20

## 2025-06-11 ASSESSMENT — PAIN SCALES - GENERAL: PAINLEVEL_OUTOF10: MILD PAIN (2)

## 2025-06-11 NOTE — PROGRESS NOTES
"  Assessment & Plan     Acute recurrent maxillary sinusitis  Sinus infection most likely due to seasonal pollen.  Noted swelling in sinuses  Start with prednisone and switch to nasal steroid if pressure continues after completing the prednisone  Hold off on antibiotic for 3-4 days if symptoms improving does not need, but if no improvement or worsening symptoms can start antibiotic  Trinh is agreeable to plan   - amoxicillin-clavulanate (AUGMENTIN) 875-125 MG tablet; Take 1 tablet by mouth 2 times daily.  - predniSONE (DELTASONE) 20 MG tablet; Take 1 tablet (20 mg) by mouth 2 times daily for 5 days.          BMI  Estimated body mass index is 25.65 kg/m  as calculated from the following:    Height as of 5/1/25: 1.499 m (4' 11\").    Weight as of this encounter: 57.6 kg (127 lb).         Follow-up   Return if symptoms worsen or fail to improve.        Francis Tsang is a 24 year old, presenting for the following health issues:  Headache        6/11/2025     7:46 AM   Additional Questions   Roomed by KIM Servin CMA   Accompanied by Self         6/11/2025     7:46 AM   Patient Reported Additional Medications   Patient reports taking the following new medications None     HPI      Pain History:  When did you first notice your pain? 6 days   Have you seen anyone else for your pain? No  How has your pain affected your ability to work? Not applicable  Where in your body do you have pain? Headache  Onset/Duration: 6 days  Description  Location: bilateral in the frontal area, bilateral in the temporal area, teeth   Character: sharp pain, achje  Frequency:  daily  Duration:  all day  Wake with headaches: No  Able to do daily activities when headache present: no   Intensity:  moderate  Progression of Symptoms:  same  Accompanying signs and symptoms:  Stiff neck: No  Neck or upper back pain: No  Sinus or URI symptoms YES- hurt  Fever: No  Nausea or vomiting: No  Dizziness: No  Numbness/tingling: No  Weakness: No  Visual " changes: none  History  Head trauma: No  Family history of migraines: No  Daily pain medication use: YES  Previous tests for headaches: No  Neurologist evaluation: No  Precipitating or Alleviating factors (light/sound/sleep/caffeine): none  Therapies tried and outcome: Ibuprofen (Advil, Motrin)              Outcome - usually effective  Frequent/daily pain medication use: YES        Review of Systems  CONSTITUTIONAL: NEGATIVE for fever, chills, change in weight  INTEGUMENTARY/SKIN: NEGATIVE for worrisome rashes, moles or lesions  EYES: NEGATIVE for vision changes or irritation  ENT/MOUTH: ears are itching and plugged, sinus pressure   RESP:NEGATIVE for significant cough or SOB  CV: NEGATIVE for chest pain, palpitations or peripheral edema  NEURO: headache for at least 5 days       Objective    /68   Pulse 88   Temp 97.6  F (36.4  C) (Tympanic)   Resp 20   Wt 57.6 kg (127 lb)   SpO2 98%   BMI 25.65 kg/m    Body mass index is 25.65 kg/m .  Physical Exam   GENERAL: alert and no distress  EYES: Eyes grossly normal to inspection, PERRL and conjunctivae and sclerae normal  HENT: normal cephalic/atraumatic, both ears: clear effusion, nose and mouth without ulcers or lesions, oropharynx clear, oral mucous membranes moist, and sinuses: maxillary, frontal tenderness on both sides, maxillary, frontal swelling on both sides  NECK: no adenopathy, no asymmetry, masses, or scars  RESP: lungs clear to auscultation - no rales, rhonchi or wheezes  CV: regular rate and rhythm, normal S1 S2, no S3 or S4, no murmur, click or rub, no peripheral edema  ABDOMEN: soft, nontender, no hepatosplenomegaly, no masses and bowel sounds normal  SKIN: no suspicious lesions or rashes  NEURO: Normal strength and tone, sensory exam grossly normal, mentation intact, speech normal, and cranial nerves 2-12 intact  PSYCH: mentation appears normal, affect normal/bright  LYMPH: normal ant/post cervical, supraclavicular nodes    No labs today          Signed Electronically by: RONEL Garcia CNP

## 2025-06-11 NOTE — PATIENT INSTRUCTIONS
Start prednisone today   If ears remain full after completing the prednisone ok to use a nasal steroid such as  Flonase or Nasacort (generic works as well)    Netipot can help clean out the sinus - use nasal steroid after using netipot     Start antibiotic around Friday or this weekend if symptoms do not improve or any worsening of symptoms

## 2025-06-18 ENCOUNTER — THERAPY VISIT (OUTPATIENT)
Dept: PHYSICAL THERAPY | Facility: HOSPITAL | Age: 25
End: 2025-06-18
Attending: OBSTETRICS & GYNECOLOGY
Payer: COMMERCIAL

## 2025-06-18 DIAGNOSIS — N94.2 VAGINISMUS: ICD-10-CM

## 2025-06-18 DIAGNOSIS — M62.89 PELVIC FLOOR TENSION: ICD-10-CM

## 2025-06-18 PROCEDURE — 97161 PT EVAL LOW COMPLEX 20 MIN: CPT | Mod: GP

## 2025-06-18 PROCEDURE — 97110 THERAPEUTIC EXERCISES: CPT | Mod: GP

## 2025-06-18 PROCEDURE — 97530 THERAPEUTIC ACTIVITIES: CPT | Mod: GP

## 2025-06-18 NOTE — PROGRESS NOTES
"PHYSICAL THERAPY EVALUATION  Type of Visit: Evaluation       Fall Risk Screen:  Have you fallen 2 or more times in the past year?: No  Have you fallen and had an injury in the past year?: No    Subjective         Presenting condition or subjective complaint: Pt presents to PT c/o tight pelvic floor and painful bladder syndrome. During the day she is able to go a couple hours without urinating however during the night she is up frequently-up to 10 times a night. Gets restless legs and strong urgency feeling. Reports she also gets pelvic pain when she has a BM. She constantly feels like she has a UTI. She gets \"full crampy\" feeling when this occurs. She also c/o painful sex for a few years  Date of onset: 06/18/25    Relevant medical history: Anemia; Bladder or bowel problems; Mental Illness   Dates & types of surgery: 07-07-23 c section    Prior diagnostic imaging/testing results: Other ultrasound. 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 like 10  large amounts   Can stop " the flow of urine when urinating: Yes  Volume of urine usually released: Average   Other issues: Dribbling after urinating  Number of bladder infections in last 12 months:    Fluid intake per day: 20    Drinks water or juice. One in a while pop   Medications taken for bladder: No     Activities causing urine leak:      Amount of urine typically leaked: drops  Pads used to help with leaking: Yes I use this many pads per day: 1 only sometimes   I use this type/brand: always      Bowel History:  Frequency of bowel movement: every day or every other day. little bits usually  Consistency of stool: Hard    Ignores the urge to defecate: No  Other bowel issues: Straining to have bowel movement  Length of time spent trying to have a bowel movement: 10 minutes?    Sexual Function History:  Sexual orientation: Straight    Sexually active: Yes  Lubrication used: Yes Yes  Pelvic pain: Certain positions; Initial penetration (rectal or vaginal); Deep penetration (rectal or vaginal); Pelvic exams; Use of tampon    Pain or difficulty with orgasms/erection/ejaculation: No    State of menopause: Perimenopause (have not gone through menopause yet)  Hormone medications: No      Are you currently pregnant: No  Number of previous pregnancies: 1  Number of deliveries: 1  If you have delivered before, did you have any of these issues during delivery:  delivery  Have you been diagnosed with pelvic prolapse or abdominal separation: No  Do you get regular exercise: No  Have you tried pelvic floor strengthening exercises for 4 weeks: No  Do you have any history of trauma that is relevant to your care that you d like to share: No      Discussed reason for referral regarding pelvic health needs and external/internal pelvic floor muscle examination with patient/guardian.  Opportunity provided to ask questions and verbal consent for assessment and intervention was given.    PAIN: Pain Level at Rest: 2/10  POSTURE: WFL  LUMBAR SCREEN:   HIP  SCREEN:  Strength:    Functional Strength Testing:     PELVIC/SI SCREEN:  WFL   BREATHING SYMMETRY: WNL      PELVIC EXAM  External Visual Inspection:  At rest: Normal  With voluntary pelvic floor contraction: Present    Integumentary:   Introitus: Tight    External Digital Palpation per Perineum:   Ischiocavernosis: Tenderness  Bulbo cavernosis: Tightness, Tenderness  Transverse perineal: Unremarkable  Levator ani: Tightness  Perineal body: Tightness      Internal Digital Palpation:  Per Vagina:  Tenderness  Myofascial Resistance to Palpation: Firm  Digital Muscle Performance: P (Power): 2/5  E (Endurance): 1 sec  R (Repetitions): 0  F (Fast Twitch):      Pelvic Organ Prolapse:   NT    ABDOMINAL ASSESSMENT  Diastasis Rectus Abdominis (BENTLEY):  Not tested today    Abdominal Activation/Strength:       CLINICAL IMPRESSIONS  Medical Diagnosis: Vaginismus, pelvic floor tension    Treatment Diagnosis: Vaginismus, pelvic floor tension   Impression/Assessment: Patient is a 24 year old female with pelvic floor tightness, pain, weakness, UI complaints.  The following significant findings have been identified: Pain, Decreased ROM/flexibility, Decreased strength, and Impaired muscle performance. These impairments interfere with their ability to perform self care tasks, recreational activities, and household chores as compared to previous level of function.     Clinical Decision Making (Complexity):  Clinical Presentation: Stable/Uncomplicated  Clinical Presentation Rationale: based on medical and personal factors listed in PT evaluation  Clinical Decision Making (Complexity): Low complexity    PLAN OF CARE  Treatment Interventions:  Modalities: Biofeedback, Ultrasound  Interventions: Manual Therapy, Neuromuscular Re-education, Therapeutic Activity, Therapeutic Exercise    Long Term Goals     PT Goal 1  Goal Identifier: STG 1  Goal Description: Pt will demonstrate knowledge/understanding of HEP and report daily compliance  Target  Date: 07/02/25  PT Goal 2  Goal Identifier: LTG 1  Goal Description: Pt will have decreased number of nightime voids from 10 to 2 or less allowing improved sleep  Target Date: 09/10/25  PT Goal 3  Goal Identifier: LTG 2  Goal Description: Pt will increase PFM strength and control as seen in the increase in the number of days staying dry to 6-7 days/ week  Target Date: 09/10/25      Frequency of Treatment: 1x/week  Duration of Treatment: up to 12 weeks    Recommended Referrals to Other Professionals:   Education Assessment:   Learner/Method: Patient  Education Comments: HEP    Risks and benefits of evaluation/treatment have been explained.   Patient/Family/caregiver agrees with Plan of Care.     Evaluation Time:     PT Eval, Low Complexity Minutes (99030): 20       Signing Clinician: Blanca Del Angel PT

## 2025-06-19 ENCOUNTER — MYC MEDICAL ADVICE (OUTPATIENT)
Dept: FAMILY MEDICINE | Facility: OTHER | Age: 25
End: 2025-06-19

## 2025-06-25 ENCOUNTER — THERAPY VISIT (OUTPATIENT)
Dept: PHYSICAL THERAPY | Facility: HOSPITAL | Age: 25
End: 2025-06-25
Attending: OBSTETRICS & GYNECOLOGY
Payer: COMMERCIAL

## 2025-06-25 ENCOUNTER — MYC MEDICAL ADVICE (OUTPATIENT)
Dept: OBGYN | Facility: OTHER | Age: 25
End: 2025-06-25

## 2025-06-25 DIAGNOSIS — N94.2 VAGINISMUS: Primary | ICD-10-CM

## 2025-06-25 DIAGNOSIS — A49.3 NONGONOCOCCAL URETHRITIS DUE TO UREAPLASMA UREALYTICUM: Primary | ICD-10-CM

## 2025-06-25 DIAGNOSIS — N34.1 NONGONOCOCCAL URETHRITIS DUE TO UREAPLASMA UREALYTICUM: Primary | ICD-10-CM

## 2025-06-25 PROCEDURE — 97140 MANUAL THERAPY 1/> REGIONS: CPT | Mod: GP

## 2025-06-25 RX ORDER — DOXYCYCLINE 100 MG/1
100 CAPSULE ORAL 2 TIMES DAILY
Qty: 14 CAPSULE | Refills: 0 | Status: SHIPPED | OUTPATIENT
Start: 2025-06-25 | End: 2025-07-02

## 2025-06-25 NOTE — TELEPHONE ENCOUNTER
Its not technically and STD but can be transmitted that way.  Its not typically recommended but given your situation and symptoms it wouldn't be a bad idea to just go ahead and treat him as well. I can send in a prescription to [facility] for him as well.

## 2025-06-27 DIAGNOSIS — R35.0 URINARY FREQUENCY: Primary | ICD-10-CM

## 2025-07-01 ENCOUNTER — OFFICE VISIT (OUTPATIENT)
Dept: UROLOGY | Facility: OTHER | Age: 25
End: 2025-07-01
Attending: OBSTETRICS & GYNECOLOGY
Payer: COMMERCIAL

## 2025-07-01 ENCOUNTER — LAB (OUTPATIENT)
Dept: LAB | Facility: OTHER | Age: 25
End: 2025-07-01
Attending: OBSTETRICS & GYNECOLOGY
Payer: COMMERCIAL

## 2025-07-01 VITALS
SYSTOLIC BLOOD PRESSURE: 106 MMHG | WEIGHT: 126.98 LBS | HEIGHT: 59 IN | OXYGEN SATURATION: 97 % | BODY MASS INDEX: 25.6 KG/M2 | HEART RATE: 88 BPM | DIASTOLIC BLOOD PRESSURE: 71 MMHG | RESPIRATION RATE: 20 BRPM

## 2025-07-01 DIAGNOSIS — Z87.442 HISTORY OF NEPHROLITHIASIS: Primary | ICD-10-CM

## 2025-07-01 DIAGNOSIS — M62.89 HIGH-TONE PELVIC FLOOR DYSFUNCTION IN FEMALE: ICD-10-CM

## 2025-07-01 DIAGNOSIS — R35.0 URINARY FREQUENCY: ICD-10-CM

## 2025-07-01 DIAGNOSIS — R39.15 URINARY URGENCY: ICD-10-CM

## 2025-07-01 DIAGNOSIS — R39.89 URETHRAL PAIN: ICD-10-CM

## 2025-07-01 LAB
ALBUMIN UR-MCNC: NEGATIVE MG/DL
AMORPH CRY #/AREA URNS HPF: ABNORMAL /HPF
APPEARANCE UR: ABNORMAL
BILIRUB UR QL STRIP: NEGATIVE
COLOR UR AUTO: YELLOW
GLUCOSE UR STRIP-MCNC: NEGATIVE MG/DL
HGB UR QL STRIP: NEGATIVE
KETONES UR STRIP-MCNC: NEGATIVE MG/DL
LEUKOCYTE ESTERASE UR QL STRIP: NEGATIVE
MUCOUS THREADS #/AREA URNS LPF: PRESENT /LPF
NITRATE UR QL: NEGATIVE
PH UR STRIP: 7 [PH] (ref 4.7–8)
RBC URINE: <1 /HPF
SP GR UR STRIP: 1.02 (ref 1–1.03)
SQUAMOUS EPITHELIAL: 2 /HPF
UROBILINOGEN UR STRIP-MCNC: NORMAL MG/DL
WBC URINE: 1 /HPF

## 2025-07-01 PROCEDURE — 81001 URINALYSIS AUTO W/SCOPE: CPT

## 2025-07-01 ASSESSMENT — PAIN SCALES - GENERAL: PAINLEVEL_OUTOF10: NO PAIN (0)

## 2025-07-01 NOTE — PROGRESS NOTES
HPI:    Trinh Gonzales is a 24 year old woman seen today for evaluation of urethral pain.  She is seen at the request of Brandi Nolan.    November 2021 she saw Dr. Hartmann for urethral pain.  She reported mid-distal urethral pain after intercourse.  She also had a ureteral stone in pregnancy and had ureteroscopy x2 with Dr. Hanley in 2023 while pregnant.    Ureaplasma + and being treated currently.  She had her initial PT eval that showed a tight and tender pelvic floor.  She is thinking she might be slightly better on the doxy.    She has urethral pain every day.  It was painful every time she had a bowel movement and every time she voids, right after.  Daytime frequency is not as bad, but overnight she voids a lot, many times.  She does not wear a pad anymore, but sometimes leaks a little bit after she pees.    Bowels are more constipated than not.  She is daily or every other day.  She sometimes takes miralax, not often only if really bloated.      Daily fluid intake:  Water - maybe 30oz per day  No caffeine, doesn't want to get addicted to it  Occasional couple times per month will drink juice or ginger ale    She does have history of stones.  She has had some low back/hip pain bilaterally.  Hasn't had any specific flank/kidney pain at any point. Strong family history of stones.        ROS:   Pertinent positives and negatives in HPI.    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 IMO Update    Irregular menses 05/23/2022    Nephrolithiasis 01/24/2023    S/P Left ureteroscopy with laser lithotripsy and stent placement    PCOS (polycystic ovarian syndrome) 07/18/2022    Primary oligomenorrhea 05/23/2022    PTSD (post-traumatic stress disorder) 01/22/2018    Suicidal  behavior 2016    [place], premature 2008    Overview:  Asymmetric.  Slight nipple enlargement on left side.  Right side has breast tissue which is probably 3-4 cm. IMO Update 10/11  Formatting of this note might be different from the original. Asymmetric.  Slight nipple enlargement on left side.  Right side has breast tissue which is probably 3-4 cm. IMO Update 10/11       Past Surgical History:   Procedure Laterality Date     SECTION N/A 2023    Procedure:  SECTION girl at 0758;  Surgeon: Tai Hoang MD;  Location: [facility] OR    COMBINED CYSTOSCOPY, URETEROSCOPY, LASER HOLMIUM LITHOTRIPSY URETER(S) Left 2023    Procedure: Left ureteroscopy with laser lithotripsy and stent placement;  Surgeon: James Hanley MD;  Location: [facility] OR    COMBINED CYSTOSCOPY, URETEROSCOPY, LASER HOLMIUM LITHOTRIPSY URETER(S) Left 3/9/2023    Procedure: Left ureteroscopy with laser lithotripsy and stent placement;  Surgeon: James Hanley MD;  Location: [facility] OR    WISDOM TOOTH EXTRACTION Bilateral        Family History   Problem Relation Age of Onset    Mental Illness Mother     Mental Illness Father         Social History     Tobacco Use    Smoking status: Never     Passive exposure: Never    Smokeless tobacco: Never   Vaping Use    Vaping status: Never Used   Substance Use Topics    Alcohol use: No    Drug use: No        Current Outpatient Medications   Medication Sig Dispense Refill    albuterol (PROAIR HFA/PROVENTIL HFA/VENTOLIN HFA) 108 (90 Base) MCG/ACT inhaler Inhale 2 puffs into the lungs every 4 hours as needed      alum & mag hydroxide-simethicone (MAALOX  ES) 400-400-40 MG/5ML SUSP suspension Take 15 mLs by mouth.      amoxicillin-clavulanate (AUGMENTIN) 875-125 MG tablet Take 1 tablet by mouth 2 times daily. 20 tablet 0    doxycycline hyclate (VIBRAMYCIN) 100 MG capsule Take 1 capsule (100 mg) by mouth 2 times daily for 7 days. 14 capsule 0    escitalopram (LEXAPRO) 10 MG tablet Take 1  "tablet (10 mg) by mouth daily. 90 tablet 1    etonogestrel-ethinyl estradiol (HALOETTE) 0.12-0.015 MG/24HR vaginal ring Insert one (1) ring vaginally and leave in place for 3 consecutive weeks (21 days), then remove for 1 week. 9 each 3    ferrous gluconate (FERGON) 324 (38 Fe) MG tablet Take 1 tablet (324 mg) by mouth daily (with breakfast) 90 tablet 1    lidocaine (XYLOCAINE) external gel Apply topically every 4 hours as needed for moderate pain. 30 mL 2    metFORMIN (GLUCOPHAGE) 500 MG tablet Take 1 tablet (500 mg) by mouth 2 times daily (with meals). 180 tablet 3    metroNIDAZOLE (METROGEL) 0.75 % vaginal gel Place 1 applicator (5 g) vaginally daily. Use at night for 5 days 70 g 0    omeprazole (PRILOSEC) 20 MG DR capsule Take 1 capsule (20 mg) by mouth daily. 90 capsule 1    ondansetron (ZOFRAN ODT) 4 MG ODT tab Take 4 mg by mouth.      scopolamine (TRANSDERM) 1 MG/3DAYS 72 hr patch       sucralfate (CARAFATE) 1 GM/10ML suspension Take 1 g by mouth.       No current facility-administered medications for this visit.       Exam:  /71 (BP Location: Left arm, Patient Position: Sitting, Cuff Size: Adult Regular)   Pulse 88   Resp 20   Ht 1.499 m (4' 11.02\")   Wt 57.6 kg (126 lb 15.8 oz)   SpO2 97%   BMI 25.63 kg/m    Gen: Pleasant, NAD  HEENT: WNL  Resp: nonlabored on RA  Abd: soft, ND, NT to palpation  : Normal external genitalia without lesions.  Urethral meatus is normal.  Urethra is normal to palpation and nontender along its length.  Supine stress test is normal.  No significant prolapse is present.  There is no significant atrophy.  Patient barely tolerates speculum.  Pelvic floor muscles are firm and tender bilaterally, L>R.    PVR checked in the supine position shortly after voiding today is 10cc.    Results/Data:    Today's UA normal    UA history:  4/8/25 normal  3/7/23 normal but few bacteria  1/18/23 large blood, smlal LE, >182 RBC, 17 WBC    Urine culture history:   1/18/23 mixed  1/12/23 " mixed  9/2/21 10/50k e. coli    01/2023 stone anaylsis  10% calcium oxalate monohydrate,   50% calcium oxalate dihydrate, and   40% calcium phosphate     Imaging:  None recent    Assessment and Plan:    Trinh Gonzales is a 24 year old woman seen today for the following:        Urinary urgency  History of nephrolithiasis  Urethral pain  High-tone pelvic floor dysfunction in female    We discussed all the ways her pelvic floor is likely contributing to her symptoms.  A pelvic floor that is dysfunctional during voiding will increase voiding pressures and can cause urethral stretch and pain.  It can contribute to pelvic pain, bladder pain, urethral pain, urgency and frequency, dysparuenia, and constipation.    It is very reasonable to finish the course of doxycycline for her ureaplasma.    The primary intervention for this problem will be pelvic floor PT which she has already started.  I encouraged her to continue with her sessions and do any at-home interventions recommended.    She does have history of stones and has not had recent imaging.  UA today with crystals.  I have recommended we obtain a CT scan to assess current stone burden.  Additionally she should increase her fluids with a goal of urine outpt of 2.5L per day, and limit her dietary salt intake.  I will call when I see the CT results.

## 2025-07-02 ENCOUNTER — THERAPY VISIT (OUTPATIENT)
Dept: PHYSICAL THERAPY | Facility: HOSPITAL | Age: 25
End: 2025-07-02
Attending: OBSTETRICS & GYNECOLOGY
Payer: COMMERCIAL

## 2025-07-02 DIAGNOSIS — N94.2 VAGINISMUS: Primary | ICD-10-CM

## 2025-07-02 PROCEDURE — 97140 MANUAL THERAPY 1/> REGIONS: CPT | Mod: GP

## 2025-07-03 ENCOUNTER — HOSPITAL ENCOUNTER (OUTPATIENT)
Dept: CT IMAGING | Facility: HOSPITAL | Age: 25
End: 2025-07-03
Attending: UROLOGY
Payer: COMMERCIAL

## 2025-07-03 DIAGNOSIS — Z87.442 HISTORY OF NEPHROLITHIASIS: ICD-10-CM

## 2025-07-03 PROCEDURE — 74176 CT ABD & PELVIS W/O CONTRAST: CPT

## 2025-07-03 PROCEDURE — 74176 CT ABD & PELVIS W/O CONTRAST: CPT | Mod: 26 | Performed by: RADIOLOGY

## 2025-07-16 ENCOUNTER — THERAPY VISIT (OUTPATIENT)
Dept: PHYSICAL THERAPY | Facility: HOSPITAL | Age: 25
End: 2025-07-16
Attending: OBSTETRICS & GYNECOLOGY
Payer: COMMERCIAL

## 2025-07-16 DIAGNOSIS — N94.2 VAGINISMUS: Primary | ICD-10-CM

## 2025-07-16 PROCEDURE — 97140 MANUAL THERAPY 1/> REGIONS: CPT | Mod: GP

## (undated) DEVICE — DRSG SPONGE STERILE 8 X 4 2259

## (undated) DEVICE — ESU GROUND PAD ADULT W/CORD E7507

## (undated) DEVICE — BASKET NITINOL TIPLESS HALO  1.5FRX120CM 554120

## (undated) DEVICE — TAPE MEDIPORE 4"X2YD 2864

## (undated) DEVICE — Device

## (undated) DEVICE — SLEEVE SCD EXPRESS KNEE LENGTH MED 9529

## (undated) DEVICE — GLOVE PROTEXIS POWDER FREE SMT 8.5 2D72PT85X

## (undated) DEVICE — SLEEVE COMPRESSION SCD KNEE MED 74022

## (undated) DEVICE — SU CHROMIC 1 CTX 36" 905H

## (undated) DEVICE — GUIDEWIRE SENSOR DUAL FLEX STR 0.035"X150CM M0066703080

## (undated) DEVICE — CATH TRAY 16FR METER W/STATLOCK LATEX] A902916

## (undated) DEVICE — CATH URETERAL 5FRX70CM OPEN END FLEX TIP G14521

## (undated) DEVICE — PREP CHLORAPREP 26ML TINTED HI-LITE ORANGE 930815

## (undated) DEVICE — PACK BASIN SET UP SUTCNBSBBA

## (undated) DEVICE — PACK CYSTO SBA15CSFCA

## (undated) DEVICE — CANISTER SUCTION MEDI-VAC GUARDIAN 2000ML 90D 65651-220

## (undated) DEVICE — PAD CHUX UNDERPAD 30X36" P3036C

## (undated) DEVICE — GUIDEWIRE AMPLATZ SUPER STIFF .038"X145CM M0066401061

## (undated) DEVICE — GLV 7.5 BIOGEL LATEX 30475-01

## (undated) DEVICE — SOL WATER 1500ML

## (undated) DEVICE — COVER LT HANDLE 2/PK 5160-2FG

## (undated) DEVICE — LABEL STERILE PREPRINTED FOR OR FRRH01-2M

## (undated) DEVICE — PACK C-SECTION CUSTOM SMA32CSMBU

## (undated) DEVICE — TUOGHY BORST ADAPTER WITH SIDE ARM

## (undated) DEVICE — SOL WATER IRRIG 1000ML BOTTLE 2F7114

## (undated) DEVICE — GOWN SURG XL LVL 3 REINFORCED 9541

## (undated) DEVICE — PUMP SYSTEM SINGLE ACTION M0067201000

## (undated) DEVICE — FLUID TRAP FOR VIROSAFE FILTERS VSFT10-10

## (undated) DEVICE — SOL NACL 0.9% IRRIG 1000ML BOTTLE 2F7124

## (undated) DEVICE — SOLTIVE SUPERPULSED LASER FIBER

## (undated) DEVICE — CATH INTERMITTENT CLEAN-CATH 16FR 16" VINYL LF 421716

## (undated) DEVICE — SU VICRYL 0 CT 36" J358H

## (undated) DEVICE — BNDG ABDOMINAL BINDER 9X45-62" 79-89071

## (undated) DEVICE — SU PDS II 0 TP-1 60" Z991G

## (undated) RX ORDER — FENTANYL CITRATE 50 UG/ML
INJECTION, SOLUTION INTRAMUSCULAR; INTRAVENOUS
Status: DISPENSED
Start: 2023-03-09

## (undated) RX ORDER — ACETAMINOPHEN 325 MG/1
TABLET ORAL
Status: DISPENSED
Start: 2023-03-09

## (undated) RX ORDER — OXYTOCIN/0.9 % SODIUM CHLORIDE 30/500 ML
PLASTIC BAG, INJECTION (ML) INTRAVENOUS
Status: DISPENSED
Start: 2023-07-07

## (undated) RX ORDER — IOPAMIDOL 755 MG/ML
INJECTION, SOLUTION INTRAVASCULAR
Status: DISPENSED
Start: 2023-01-24

## (undated) RX ORDER — ACETAMINOPHEN 10 MG/ML
INJECTION, SOLUTION INTRAVENOUS
Status: DISPENSED
Start: 2023-01-24

## (undated) RX ORDER — FENTANYL CITRATE 50 UG/ML
INJECTION, SOLUTION INTRAMUSCULAR; INTRAVENOUS
Status: DISPENSED
Start: 2023-01-24

## (undated) RX ORDER — KETOROLAC TROMETHAMINE 30 MG/ML
INJECTION, SOLUTION INTRAMUSCULAR; INTRAVENOUS
Status: DISPENSED
Start: 2023-07-07

## (undated) RX ORDER — CEFTRIAXONE SODIUM 2 G/50ML
INJECTION, SOLUTION INTRAVENOUS
Status: DISPENSED
Start: 2023-01-24

## (undated) RX ORDER — FENTANYL CITRATE-0.9 % NACL/PF 10 MCG/ML
PLASTIC BAG, INJECTION (ML) INTRAVENOUS
Status: DISPENSED
Start: 2023-03-09

## (undated) RX ORDER — PROPOFOL 10 MG/ML
INJECTION, EMULSION INTRAVENOUS
Status: DISPENSED
Start: 2023-03-09

## (undated) RX ORDER — ONDANSETRON 2 MG/ML
INJECTION INTRAMUSCULAR; INTRAVENOUS
Status: DISPENSED
Start: 2023-07-07

## (undated) RX ORDER — CEFTRIAXONE SODIUM 2 G/50ML
INJECTION, SOLUTION INTRAVENOUS
Status: DISPENSED
Start: 2023-03-09

## (undated) RX ORDER — SODIUM CHLORIDE, SODIUM LACTATE, POTASSIUM CHLORIDE, CALCIUM CHLORIDE 600; 310; 30; 20 MG/100ML; MG/100ML; MG/100ML; MG/100ML
INJECTION, SOLUTION INTRAVENOUS
Status: DISPENSED
Start: 2023-03-09

## (undated) RX ORDER — PROPOFOL 10 MG/ML
INJECTION, EMULSION INTRAVENOUS
Status: DISPENSED
Start: 2023-01-24

## (undated) RX ORDER — EPHEDRINE SULFATE 50 MG/ML
INJECTION, SOLUTION INTRAMUSCULAR; INTRAVENOUS; SUBCUTANEOUS
Status: DISPENSED
Start: 2023-07-07

## (undated) RX ORDER — HYDROCODONE BITARTRATE AND ACETAMINOPHEN 5; 325 MG/1; MG/1
TABLET ORAL
Status: DISPENSED
Start: 2023-01-24

## (undated) RX ORDER — FENTANYL CITRATE-0.9 % NACL/PF 10 MCG/ML
PLASTIC BAG, INJECTION (ML) INTRAVENOUS
Status: DISPENSED
Start: 2023-07-07

## (undated) RX ORDER — ONDANSETRON 2 MG/ML
INJECTION INTRAMUSCULAR; INTRAVENOUS
Status: DISPENSED
Start: 2023-01-24